# Patient Record
Sex: MALE | Race: WHITE | NOT HISPANIC OR LATINO
[De-identification: names, ages, dates, MRNs, and addresses within clinical notes are randomized per-mention and may not be internally consistent; named-entity substitution may affect disease eponyms.]

---

## 2017-08-15 ENCOUNTER — APPOINTMENT (OUTPATIENT)
Dept: ELECTROPHYSIOLOGY | Facility: CLINIC | Age: 47
End: 2017-08-15
Payer: COMMERCIAL

## 2017-08-15 VITALS
HEART RATE: 72 BPM | DIASTOLIC BLOOD PRESSURE: 82 MMHG | BODY MASS INDEX: 32.21 KG/M2 | HEIGHT: 70 IN | WEIGHT: 225 LBS | SYSTOLIC BLOOD PRESSURE: 122 MMHG

## 2017-08-15 PROCEDURE — 99214 OFFICE O/P EST MOD 30 MIN: CPT

## 2017-08-15 PROCEDURE — 93000 ELECTROCARDIOGRAM COMPLETE: CPT

## 2017-12-14 ENCOUNTER — MEDICATION RENEWAL (OUTPATIENT)
Age: 47
End: 2017-12-14

## 2018-01-04 ENCOUNTER — MEDICATION RENEWAL (OUTPATIENT)
Age: 48
End: 2018-01-04

## 2018-02-28 ENCOUNTER — APPOINTMENT (OUTPATIENT)
Dept: ELECTROPHYSIOLOGY | Facility: CLINIC | Age: 48
End: 2018-02-28
Payer: COMMERCIAL

## 2018-02-28 PROCEDURE — 99215 OFFICE O/P EST HI 40 MIN: CPT

## 2018-02-28 PROCEDURE — 93000 ELECTROCARDIOGRAM COMPLETE: CPT

## 2018-03-27 ENCOUNTER — OUTPATIENT (OUTPATIENT)
Dept: OUTPATIENT SERVICES | Facility: HOSPITAL | Age: 48
LOS: 1 days | Discharge: HOME | End: 2018-03-27
Payer: COMMERCIAL

## 2018-03-27 DIAGNOSIS — Z98.89 OTHER SPECIFIED POSTPROCEDURAL STATES: Chronic | ICD-10-CM

## 2018-03-27 DIAGNOSIS — R06.00 DYSPNEA, UNSPECIFIED: ICD-10-CM

## 2018-03-27 DIAGNOSIS — Z96.652 PRESENCE OF LEFT ARTIFICIAL KNEE JOINT: Chronic | ICD-10-CM

## 2018-03-27 DIAGNOSIS — R00.2 PALPITATIONS: ICD-10-CM

## 2018-03-27 DIAGNOSIS — I34.0 NONRHEUMATIC MITRAL (VALVE) INSUFFICIENCY: ICD-10-CM

## 2018-03-27 PROCEDURE — 93018 CV STRESS TEST I&R ONLY: CPT

## 2018-03-27 PROCEDURE — 93350 STRESS TTE ONLY: CPT | Mod: 26

## 2018-03-27 PROCEDURE — 93016 CV STRESS TEST SUPVJ ONLY: CPT

## 2018-07-12 ENCOUNTER — APPOINTMENT (OUTPATIENT)
Dept: ELECTROPHYSIOLOGY | Facility: CLINIC | Age: 48
End: 2018-07-12

## 2018-07-18 ENCOUNTER — MEDICATION RENEWAL (OUTPATIENT)
Age: 48
End: 2018-07-18

## 2018-08-04 ENCOUNTER — TRANSCRIPTION ENCOUNTER (OUTPATIENT)
Age: 48
End: 2018-08-04

## 2018-08-13 ENCOUNTER — TRANSCRIPTION ENCOUNTER (OUTPATIENT)
Age: 48
End: 2018-08-13

## 2018-10-26 ENCOUNTER — TRANSCRIPTION ENCOUNTER (OUTPATIENT)
Age: 48
End: 2018-10-26

## 2018-12-21 ENCOUNTER — APPOINTMENT (OUTPATIENT)
Dept: ELECTROPHYSIOLOGY | Facility: CLINIC | Age: 48
End: 2018-12-21
Payer: COMMERCIAL

## 2018-12-21 ENCOUNTER — NON-APPOINTMENT (OUTPATIENT)
Age: 48
End: 2018-12-21

## 2018-12-21 VITALS
DIASTOLIC BLOOD PRESSURE: 69 MMHG | BODY MASS INDEX: 31.5 KG/M2 | SYSTOLIC BLOOD PRESSURE: 123 MMHG | HEART RATE: 69 BPM | WEIGHT: 220 LBS | HEIGHT: 70 IN

## 2018-12-21 PROCEDURE — 99214 OFFICE O/P EST MOD 30 MIN: CPT

## 2018-12-21 PROCEDURE — 93000 ELECTROCARDIOGRAM COMPLETE: CPT

## 2019-01-25 ENCOUNTER — APPOINTMENT (OUTPATIENT)
Dept: ORTHOPEDIC SURGERY | Facility: CLINIC | Age: 49
End: 2019-01-25
Payer: OTHER MISCELLANEOUS

## 2019-01-25 VITALS — HEIGHT: 70 IN | BODY MASS INDEX: 31.5 KG/M2 | WEIGHT: 220 LBS

## 2019-01-25 PROCEDURE — 73560 X-RAY EXAM OF KNEE 1 OR 2: CPT | Mod: LT

## 2019-01-25 PROCEDURE — 73562 X-RAY EXAM OF KNEE 3: CPT | Mod: RT

## 2019-01-25 PROCEDURE — 99214 OFFICE O/P EST MOD 30 MIN: CPT

## 2019-02-11 NOTE — HISTORY OF PRESENT ILLNESS
0815 Patient allergies and NPO status verified. Patient verbalizes understanding of pain scale, expected course of stay and plan of care. Surgical site verified with patient. IV assessed for patency, pt awaiting surgery.       [FreeTextEntry1] : This is a 48-year-old gentleman with a history of PVCs and palpitations who presents for followup. He reports that his PVCs are well controlled with only rare episodes of palpitations.\par \par DINORA WESTON  denies chest pain, chest pressure, shortness of breath, lightheadedness, dizziness, recent palpitations, syncope, presyncope, orthopnea, PND, or edema.

## 2019-02-11 NOTE — PHYSICAL EXAM
[General Appearance - Well Developed] : well developed [Normal Appearance] : normal appearance [Well Groomed] : well groomed [General Appearance - Well Nourished] : well nourished [No Deformities] : no deformities [General Appearance - In No Acute Distress] : no acute distress [Normal Conjunctiva] : the conjunctiva exhibited no abnormalities [Eyelids - No Xanthelasma] : the eyelids demonstrated no xanthelasmas [Normal Oral Mucosa] : normal oral mucosa [No Oral Pallor] : no oral pallor [No Oral Cyanosis] : no oral cyanosis [Normal Jugular Venous A Waves Present] : normal jugular venous A waves present [Normal Jugular Venous V Waves Present] : normal jugular venous V waves present [No Jugular Venous Brewer A Waves] : no jugular venous brewer A waves [Respiration, Rhythm And Depth] : normal respiratory rhythm and effort [Exaggerated Use Of Accessory Muscles For Inspiration] : no accessory muscle use [Auscultation Breath Sounds / Voice Sounds] : lungs were clear to auscultation bilaterally [Heart Rate And Rhythm] : heart rate and rhythm were normal [Heart Sounds] : normal S1 and S2 [Murmurs] : no murmurs present [Abdomen Soft] : soft [Abdomen Tenderness] : non-tender [Abdomen Mass (___ Cm)] : no abdominal mass palpated [Abnormal Walk] : normal gait [Gait - Sufficient For Exercise Testing] : the gait was sufficient for exercise testing [Nail Clubbing] : no clubbing of the fingernails [Cyanosis, Localized] : no localized cyanosis [Petechial Hemorrhages (___cm)] : no petechial hemorrhages [Skin Color & Pigmentation] : normal skin color and pigmentation [] : no rash [No Venous Stasis] : no venous stasis [Skin Lesions] : no skin lesions [No Skin Ulcers] : no skin ulcer [No Xanthoma] : no  xanthoma was observed [Oriented To Time, Place, And Person] : oriented to person, place, and time [Affect] : the affect was normal [Mood] : the mood was normal [No Anxiety] : not feeling anxious

## 2019-02-11 NOTE — ASSESSMENT
[FreeTextEntry1] : This is a 47-year-old gentleman with RVOT morphology  PVCs with stable symptoms.

## 2019-04-15 RX ORDER — AZTREONAM 2 G
1 VIAL (EA) INJECTION
Qty: 0 | Refills: 0 | DISCHARGE
Start: 2019-04-15

## 2019-04-15 RX ORDER — CEFUROXIME AXETIL 250 MG
1 TABLET ORAL
Qty: 0 | Refills: 0 | DISCHARGE
Start: 2019-04-15

## 2019-04-25 ENCOUNTER — INPATIENT (INPATIENT)
Facility: HOSPITAL | Age: 49
LOS: 2 days | Discharge: HOME | End: 2019-04-28
Attending: INTERNAL MEDICINE | Admitting: INTERNAL MEDICINE
Payer: COMMERCIAL

## 2019-04-25 VITALS
TEMPERATURE: 99 F | HEIGHT: 70 IN | RESPIRATION RATE: 17 BRPM | OXYGEN SATURATION: 95 % | HEART RATE: 130 BPM | WEIGHT: 220.02 LBS | DIASTOLIC BLOOD PRESSURE: 79 MMHG | SYSTOLIC BLOOD PRESSURE: 136 MMHG

## 2019-04-25 DIAGNOSIS — Z96.652 PRESENCE OF LEFT ARTIFICIAL KNEE JOINT: Chronic | ICD-10-CM

## 2019-04-25 DIAGNOSIS — Z98.89 OTHER SPECIFIED POSTPROCEDURAL STATES: Chronic | ICD-10-CM

## 2019-04-25 LAB
ALBUMIN SERPL ELPH-MCNC: 4.3 G/DL — SIGNIFICANT CHANGE UP (ref 3.3–5)
ALP SERPL-CCNC: 31 U/L — LOW (ref 40–120)
ALT FLD-CCNC: 34 U/L — SIGNIFICANT CHANGE UP (ref 12–78)
ANION GAP SERPL CALC-SCNC: 8 MMOL/L — SIGNIFICANT CHANGE UP (ref 5–17)
APPEARANCE UR: ABNORMAL
APTT BLD: 28.9 SEC — SIGNIFICANT CHANGE UP (ref 27.5–36.3)
AST SERPL-CCNC: 17 U/L — SIGNIFICANT CHANGE UP (ref 15–37)
BACTERIA # UR AUTO: ABNORMAL
BASOPHILS # BLD AUTO: 0 K/UL — SIGNIFICANT CHANGE UP (ref 0–0.2)
BASOPHILS NFR BLD AUTO: 0 % — SIGNIFICANT CHANGE UP (ref 0–2)
BILIRUB SERPL-MCNC: 1 MG/DL — SIGNIFICANT CHANGE UP (ref 0.2–1.2)
BILIRUB UR-MCNC: NEGATIVE — SIGNIFICANT CHANGE UP
BUN SERPL-MCNC: 14 MG/DL — SIGNIFICANT CHANGE UP (ref 7–23)
CALCIUM SERPL-MCNC: 9.1 MG/DL — SIGNIFICANT CHANGE UP (ref 8.5–10.1)
CHLORIDE SERPL-SCNC: 106 MMOL/L — SIGNIFICANT CHANGE UP (ref 96–108)
CO2 SERPL-SCNC: 25 MMOL/L — SIGNIFICANT CHANGE UP (ref 22–31)
COLOR SPEC: ABNORMAL
COMMENT - URINE: SIGNIFICANT CHANGE UP
CREAT SERPL-MCNC: 1.16 MG/DL — SIGNIFICANT CHANGE UP (ref 0.5–1.3)
DIFF PNL FLD: ABNORMAL
EOSINOPHIL # BLD AUTO: 0 K/UL — SIGNIFICANT CHANGE UP (ref 0–0.5)
EOSINOPHIL NFR BLD AUTO: 0 % — SIGNIFICANT CHANGE UP (ref 0–6)
GLUCOSE SERPL-MCNC: 91 MG/DL — SIGNIFICANT CHANGE UP (ref 70–99)
GLUCOSE UR QL: NEGATIVE MG/DL — SIGNIFICANT CHANGE UP
HCT VFR BLD CALC: 45.6 % — SIGNIFICANT CHANGE UP (ref 39–50)
HGB BLD-MCNC: 16.2 G/DL — SIGNIFICANT CHANGE UP (ref 13–17)
INR BLD: 1.23 RATIO — HIGH (ref 0.88–1.16)
KETONES UR-MCNC: NEGATIVE — SIGNIFICANT CHANGE UP
LACTATE SERPL-SCNC: 2 MMOL/L — SIGNIFICANT CHANGE UP (ref 0.7–2)
LEUKOCYTE ESTERASE UR-ACNC: ABNORMAL
LYMPHOCYTES # BLD AUTO: 0.39 K/UL — LOW (ref 1–3.3)
LYMPHOCYTES # BLD AUTO: 4 % — LOW (ref 13–44)
MANUAL SMEAR VERIFICATION: SIGNIFICANT CHANGE UP
MCHC RBC-ENTMCNC: 32 PG — SIGNIFICANT CHANGE UP (ref 27–34)
MCHC RBC-ENTMCNC: 35.5 GM/DL — SIGNIFICANT CHANGE UP (ref 32–36)
MCV RBC AUTO: 90.1 FL — SIGNIFICANT CHANGE UP (ref 80–100)
MONOCYTES # BLD AUTO: 0.1 K/UL — SIGNIFICANT CHANGE UP (ref 0–0.9)
MONOCYTES NFR BLD AUTO: 1 % — LOW (ref 2–14)
NEUTROPHILS # BLD AUTO: 9.1 K/UL — HIGH (ref 1.8–7.4)
NEUTROPHILS NFR BLD AUTO: 83 % — HIGH (ref 43–77)
NEUTS BAND # BLD: 11 % — HIGH (ref 0–8)
NITRITE UR-MCNC: NEGATIVE — SIGNIFICANT CHANGE UP
NRBC # BLD: 0 /100 — SIGNIFICANT CHANGE UP (ref 0–0)
NRBC # BLD: SIGNIFICANT CHANGE UP /100 WBCS (ref 0–0)
PH UR: 5 — SIGNIFICANT CHANGE UP (ref 5–8)
PLAT MORPH BLD: NORMAL — SIGNIFICANT CHANGE UP
PLATELET # BLD AUTO: 145 K/UL — LOW (ref 150–400)
POTASSIUM SERPL-MCNC: 3.6 MMOL/L — SIGNIFICANT CHANGE UP (ref 3.5–5.3)
POTASSIUM SERPL-SCNC: 3.6 MMOL/L — SIGNIFICANT CHANGE UP (ref 3.5–5.3)
PROT SERPL-MCNC: 7.6 GM/DL — SIGNIFICANT CHANGE UP (ref 6–8.3)
PROT UR-MCNC: 30 MG/DL
PROTHROM AB SERPL-ACNC: 13.7 SEC — HIGH (ref 10–12.9)
RBC # BLD: 5.06 M/UL — SIGNIFICANT CHANGE UP (ref 4.2–5.8)
RBC # FLD: 12.4 % — SIGNIFICANT CHANGE UP (ref 10.3–14.5)
RBC BLD AUTO: NORMAL — SIGNIFICANT CHANGE UP
RBC CASTS # UR COMP ASSIST: >50 /HPF (ref 0–4)
SODIUM SERPL-SCNC: 139 MMOL/L — SIGNIFICANT CHANGE UP (ref 135–145)
SP GR SPEC: 1.01 — SIGNIFICANT CHANGE UP (ref 1.01–1.02)
UROBILINOGEN FLD QL: NEGATIVE MG/DL — SIGNIFICANT CHANGE UP
VARIANT LYMPHS # BLD: 1 % — SIGNIFICANT CHANGE UP (ref 0–6)
WBC # BLD: 9.68 K/UL — SIGNIFICANT CHANGE UP (ref 3.8–10.5)
WBC # FLD AUTO: 9.68 K/UL — SIGNIFICANT CHANGE UP (ref 3.8–10.5)
WBC UR QL: SIGNIFICANT CHANGE UP

## 2019-04-25 PROCEDURE — 99285 EMERGENCY DEPT VISIT HI MDM: CPT

## 2019-04-25 PROCEDURE — 74177 CT ABD & PELVIS W/CONTRAST: CPT | Mod: 26

## 2019-04-25 PROCEDURE — 93010 ELECTROCARDIOGRAM REPORT: CPT

## 2019-04-25 RX ORDER — DOCUSATE SODIUM 100 MG
100 CAPSULE ORAL THREE TIMES A DAY
Qty: 0 | Refills: 0 | Status: DISCONTINUED | OUTPATIENT
Start: 2019-04-25 | End: 2019-04-28

## 2019-04-25 RX ORDER — ENOXAPARIN SODIUM 100 MG/ML
40 INJECTION SUBCUTANEOUS EVERY 24 HOURS
Qty: 0 | Refills: 0 | Status: DISCONTINUED | OUTPATIENT
Start: 2019-04-25 | End: 2019-04-25

## 2019-04-25 RX ORDER — PIPERACILLIN AND TAZOBACTAM 4; .5 G/20ML; G/20ML
3.38 INJECTION, POWDER, LYOPHILIZED, FOR SOLUTION INTRAVENOUS ONCE
Qty: 0 | Refills: 0 | Status: COMPLETED | OUTPATIENT
Start: 2019-04-25 | End: 2019-04-25

## 2019-04-25 RX ORDER — SODIUM CHLORIDE 9 MG/ML
1000 INJECTION INTRAMUSCULAR; INTRAVENOUS; SUBCUTANEOUS
Qty: 0 | Refills: 0 | Status: DISCONTINUED | OUTPATIENT
Start: 2019-04-25 | End: 2019-04-27

## 2019-04-25 RX ORDER — ACETAMINOPHEN 500 MG
1000 TABLET ORAL ONCE
Qty: 0 | Refills: 0 | Status: COMPLETED | OUTPATIENT
Start: 2019-04-25 | End: 2019-04-25

## 2019-04-25 RX ORDER — SODIUM CHLORIDE 9 MG/ML
1000 INJECTION INTRAMUSCULAR; INTRAVENOUS; SUBCUTANEOUS ONCE
Qty: 0 | Refills: 0 | Status: COMPLETED | OUTPATIENT
Start: 2019-04-25 | End: 2019-04-25

## 2019-04-25 RX ORDER — SODIUM CHLORIDE 9 MG/ML
2250 INJECTION INTRAMUSCULAR; INTRAVENOUS; SUBCUTANEOUS ONCE
Qty: 0 | Refills: 0 | Status: COMPLETED | OUTPATIENT
Start: 2019-04-25 | End: 2019-04-25

## 2019-04-25 RX ORDER — KETOROLAC TROMETHAMINE 30 MG/ML
15 SYRINGE (ML) INJECTION EVERY 6 HOURS
Qty: 0 | Refills: 0 | Status: DISCONTINUED | OUTPATIENT
Start: 2019-04-25 | End: 2019-04-28

## 2019-04-25 RX ORDER — ONDANSETRON 8 MG/1
4 TABLET, FILM COATED ORAL EVERY 6 HOURS
Qty: 0 | Refills: 0 | Status: DISCONTINUED | OUTPATIENT
Start: 2019-04-25 | End: 2019-04-28

## 2019-04-25 RX ORDER — VANCOMYCIN HCL 1 G
1500 VIAL (EA) INTRAVENOUS ONCE
Qty: 0 | Refills: 0 | Status: COMPLETED | OUTPATIENT
Start: 2019-04-25 | End: 2019-04-25

## 2019-04-25 RX ORDER — PANTOPRAZOLE SODIUM 20 MG/1
40 TABLET, DELAYED RELEASE ORAL
Qty: 0 | Refills: 0 | Status: DISCONTINUED | OUTPATIENT
Start: 2019-04-25 | End: 2019-04-28

## 2019-04-25 RX ORDER — PINDOLOL 10 MG
5 TABLET ORAL EVERY 12 HOURS
Qty: 0 | Refills: 0 | Status: DISCONTINUED | OUTPATIENT
Start: 2019-04-25 | End: 2019-04-28

## 2019-04-25 RX ORDER — MORPHINE SULFATE 50 MG/1
2 CAPSULE, EXTENDED RELEASE ORAL EVERY 4 HOURS
Qty: 0 | Refills: 0 | Status: DISCONTINUED | OUTPATIENT
Start: 2019-04-25 | End: 2019-04-28

## 2019-04-25 RX ORDER — ACETAMINOPHEN 500 MG
650 TABLET ORAL EVERY 6 HOURS
Qty: 0 | Refills: 0 | Status: DISCONTINUED | OUTPATIENT
Start: 2019-04-25 | End: 2019-04-28

## 2019-04-25 RX ORDER — PIPERACILLIN AND TAZOBACTAM 4; .5 G/20ML; G/20ML
3.38 INJECTION, POWDER, LYOPHILIZED, FOR SOLUTION INTRAVENOUS EVERY 8 HOURS
Qty: 0 | Refills: 0 | Status: DISCONTINUED | OUTPATIENT
Start: 2019-04-25 | End: 2019-04-28

## 2019-04-25 RX ADMIN — Medication 1000 MILLIGRAM(S): at 12:30

## 2019-04-25 RX ADMIN — Medication 250 MILLIGRAM(S): at 15:12

## 2019-04-25 RX ADMIN — SODIUM CHLORIDE 1125 MILLILITER(S): 9 INJECTION INTRAMUSCULAR; INTRAVENOUS; SUBCUTANEOUS at 11:08

## 2019-04-25 RX ADMIN — SODIUM CHLORIDE 2250 MILLILITER(S): 9 INJECTION INTRAMUSCULAR; INTRAVENOUS; SUBCUTANEOUS at 12:15

## 2019-04-25 RX ADMIN — PIPERACILLIN AND TAZOBACTAM 25 GRAM(S): 4; .5 INJECTION, POWDER, LYOPHILIZED, FOR SOLUTION INTRAVENOUS at 22:18

## 2019-04-25 RX ADMIN — Medication 650 MILLIGRAM(S): at 21:30

## 2019-04-25 RX ADMIN — Medication 1000 MILLIGRAM(S): at 11:26

## 2019-04-25 RX ADMIN — PIPERACILLIN AND TAZOBACTAM 3.38 GRAM(S): 4; .5 INJECTION, POWDER, LYOPHILIZED, FOR SOLUTION INTRAVENOUS at 12:24

## 2019-04-25 RX ADMIN — PIPERACILLIN AND TAZOBACTAM 200 GRAM(S): 4; .5 INJECTION, POWDER, LYOPHILIZED, FOR SOLUTION INTRAVENOUS at 11:27

## 2019-04-25 RX ADMIN — SODIUM CHLORIDE 2000 MILLILITER(S): 9 INJECTION INTRAMUSCULAR; INTRAVENOUS; SUBCUTANEOUS at 22:09

## 2019-04-25 NOTE — ED STATDOCS - PMH
Gastroesophageal reflux disease without esophagitis  on occasion  Knee pain, left    Lumbar disc herniation  L4-5, asymptomatic  NSVT (nonsustained ventricular tachycardia)    Osteoarthritis of left knee    Palpitations

## 2019-04-25 NOTE — ED ADULT TRIAGE NOTE - CHIEF COMPLAINT QUOTE
Patient complaining of fever, headache, bloody urine and states "my kidneys hurt". Patient had needle biopsy of prostates on Tuesday evening 4/23 at 5pm. patient reports he has been on antibiotics after procedure. denies taking motrin or tylenol.

## 2019-04-25 NOTE — ED STATDOCS - OBJECTIVE STATEMENT
50 y/o M with a PMHx of GERD, OA, and Lumbar Disc Herniation presenting to the ED c/o sudden onset chills and fever this morning. Pt reports that he had a prostate biopsy two days ago by Dr. Douglas. Started on Bactrim and Cefuroxime three days ago. This morning after getting out of the shower, pt started experience sudden onset of chills. He then started to feel hot, took temp of 103F. Called Dr. Douglas's office and advised to come into ED for evaluation. +bilateral flank pain. Temp 98.8F orally in ED triage vitals. Denies any CP, SOB, vomiting, diarrhea, or rashes. Denies taking any medications at home today for symptoms.

## 2019-04-25 NOTE — ED STATDOCS - PROGRESS NOTE DETAILS
Ilir FINCH for ED attending, Dr. Lazo:  IV fluids will be given based on ideal body weight, height of 5'10". 49 yr. old male PMH: GERD, OA Lumbar Disc Herniation presents to ED with sudden onset of fever of 103, chills, bilateral flank pain and hematuria. Recent Biopsy of prostate at 5 pm on Tuesday.  Started Rx. Bactrim and Cefuroxime on Monday. Currently on Pindolol for palpitations. Seen and examined by attending ion Intake. Plan: IV, IVF, labs, Zosyn, UA/UC  Will F/U with results and re evaluate. Avery DYSON Results of Lab work and CT abd./pelvis discussed with patient and plan to admit for ferver S/P prostate biopsy. Avery DYSON

## 2019-04-25 NOTE — ED ADULT NURSE NOTE - NSIMPLEMENTINTERV_GEN_ALL_ED
Implemented All Universal Safety Interventions:  [place] to call system. Call bell, personal items and telephone within reach. Instruct patient to call for assistance. Room bathroom lighting operational. Non-slip footwear when patient is off stretcher. Physically safe environment: no spills, clutter or unnecessary equipment. Stretcher in lowest position, wheels locked, appropriate side rails in place.

## 2019-04-25 NOTE — ED STATDOCS - PSH
S/P ablation of ventricular arrhythmia  2008  S/P hemorrhoidectomy  2014  S/P knee surgery  1984, 1991, 2007, 2008, 2013  S/P laparoscopy  2010  S/P LASIK surgery  2013  S/P shoulder surgery  1995, 1999  S/P tonsillectomy and adenoidectomy  1973  Status post total left knee replacement  2014

## 2019-04-25 NOTE — H&P ADULT - NSICDXPASTMEDICALHX_GEN_ALL_CORE_FT
PAST MEDICAL HISTORY:  Gastroesophageal reflux disease without esophagitis on occasion    Knee pain, left     Lumbar disc herniation L4-5, asymptomatic    NSVT (nonsustained ventricular tachycardia)     Osteoarthritis of left knee     Palpitations

## 2019-04-25 NOTE — H&P ADULT - NSICDXFAMILYHX_GEN_ALL_CORE_FT
FAMILY HISTORY:  Mother  Still living? Yes, Estimated age: 61-70  Family history of alcoholism, Age at diagnosis: Age Unknown  Family history of cervical cancer, Age at diagnosis: 31-40    Grandparent  Still living? No  Family history of alcoholism, Age at diagnosis: Age Unknown

## 2019-04-25 NOTE — ED ADULT NURSE REASSESSMENT NOTE - NS ED NURSE REASSESS COMMENT FT1
Patient reports that he takes pindolol and has not received his daily dose.  Dose confirmed and hospitalist called referred to Senior Resident spoke with Senior night Resident asked that his Pindolol be ordered awaiting order at this time.  Patient moved to ST 9 for his comfort, pillow and blankets given.  Patient oriented to call bell and TV.  Will continue to monitor.

## 2019-04-25 NOTE — H&P ADULT - HISTORY OF PRESENT ILLNESS
49 yr. old male PMH: GERD, OA Lumbar Disc Herniation presents to ED with sudden onset of fever of 103, chills, bilateral flank pain and hematuria. Recent Biopsy of prostate at 5 pm on Tuesday.  Started Rx. Bactrim and Cefuroxime on Monday. Currently on Pindolol for palpitations. Seen and examined by attending ion Intake. Plan: IV, IVF, labs, Zosyn, UA/UC  Will F/U with results and re evaluate. Avery DYSON. 49 yr. old male w/ PMH GERD, OA Lumbar Disc Herniation, prostate issues w/ s/p biopsy 4/23 was given oral abx Bactrim and Cefuroxime, developed fevers at home.  Patirnt states she developed fever at home 103 and since recent procedure, he presented to ER.  He admits to hematuria in urine initally when he urinates, and then the rest of the urine runs clean.  He also admits to b/l flank pains. Currently he denies any fevers, chills, chest pain, n/v/d

## 2019-04-25 NOTE — H&P ADULT - ASSESSMENT
49 yr. old male w/ PMH GERD, OA Lumbar Disc Herniation, prostate issues w/ s/p biopsy 4/23 was given oral abx Bactrim and Cefuroxime, developed fevers at home.  Patirnt states she developed fever at home 103 and since recent procedure, he presented to ER.  He admits to hematuria in urine initally when he urinates, and then the rest of the urine runs clean.  He also admits to b/l flank pains.       temp 100.6 in ER w/  w/ temp at home 103  Found to have Bandemia.    1-sepsis secondary to prostatitis, uti, and possibly underlying early pyelonephritis secondary to urologic procedure  -patients admits to b/l flank pain/ mild pain on exam  -start zosyn  -f/u urine cx/ blood cx  -infectious disease consult  -urology consult with dr. leonardo  -iv fluids ns@80cc/hr  '-pain control iv ketorolac, iv morphine    2-DVT prophhy- no risk factors, young paient, encourage ambulation

## 2019-04-25 NOTE — H&P ADULT - NSICDXPASTSURGICALHX_GEN_ALL_CORE_FT
PAST SURGICAL HISTORY:  S/P ablation of ventricular arrhythmia 2008    S/P hemorrhoidectomy 2014    S/P knee surgery 1984, 1991, 2007, 2008, 2013    S/P laparoscopy 2010    S/P LASIK surgery 2013    S/P shoulder surgery 1995, 1999    S/P tonsillectomy and adenoidectomy 1973    Status post total left knee replacement 2014

## 2019-04-25 NOTE — CHART NOTE - NSCHARTNOTEFT_GEN_A_CORE
called by nurse that patient is shaking.    came to evaluate patient, noted to have rigors and fever.   maintenance fluid ordered 150cc/hr for 10 hours  zosyn being hung  sepsis w/u preformed in ED upon admission with normal lactate  tylenol for fever.

## 2019-04-25 NOTE — ED STATDOCS - CLINICAL SUMMARY MEDICAL DECISION MAKING FREE TEXT BOX
Pt febrile, with bandemia 2/2 prostate biopsy, tachycardic to 130's.  UA with large blood, appears not infected.  CT scan without signs of abscess.  Given IV fluids, Zosyn.  Adding on vancomycin.  Admit to medicine service for further evaluation and management.

## 2019-04-25 NOTE — H&P ADULT - NSHPLABSRESULTS_GEN_ALL_CORE
Urinalysis Basic - ( 2019 11:11 )    Color: Barbara / Appearance: very cloudy / S.010 / pH: x  Gluc: x / Ketone: Negative  / Bili: Negative / Urobili: Negative mg/dL   Blood: x / Protein: 30 mg/dL / Nitrite: Negative   Leuk Esterase: Trace / RBC: >50 /HPF / WBC 3-5   Sq Epi: x / Non Sq Epi: x / Bacteria: Few    2019 11:11    139    |  106    |  14     ----------------------------<  91     3.6     |  25     |  1.16     Ca    9.1        2019 11:11    TPro  7.6    /  Alb  4.3    /  TBili  1.0    /  DBili  x      /  AST  17     /  ALT  34     /  AlkPhos  31     2019 11:11  LIVER FUNCTIONS - ( 2019 11:11 )  Alb: 4.3 g/dL / Pro: 7.6 gm/dL / ALK PHOS: 31 U/L / ALT: 34 U/L / AST: 17 U/L / GGT: x         PT/INR - ( 2019 11:11 )   PT: 13.7 sec;   INR: 1.23 ratio         PTT - ( 2019 11:11 )  PTT:28.9 secCBC Full  -  ( 2019 11:11 )  WBC Count : 9.68 K/uL  Hemoglobin : 16.2 g/dL  Hematocrit : 45.6 %  Platelet Count - Automated : 145 K/uL  Mean Cell Volume : 90.1 fl  Mean Cell Hemoglobin : 32.0 pg  Mean Cell Hemoglobin Concentration : 35.5 gm/dL  Auto Neutrophil # : 9.10 K/uL  Auto Lymphocyte # : 0.39 K/uL  Auto Monocyte # : 0.10 K/uL  Auto Eosinophil # : 0.00 K/uL  Auto Basophil # : 0.00 K/uL  Auto Neutrophil % : 83.0 %  Auto Lymphocyte % : 4.0 %  Auto Monocyte % : 1.0 %  Auto Eosinophil % : 0.0 %  Auto Basophil % : 0.0 %

## 2019-04-25 NOTE — H&P ADULT - NSHPPHYSICALEXAM_GEN_ALL_CORE
Vital Signs Last 24 Hrs  T(C): 37.5 (25 Apr 2019 21:04), Max: 38.1 (25 Apr 2019 12:23)  T(F): 99.5 (25 Apr 2019 21:04), Max: 100.6 (25 Apr 2019 12:23)  HR: 83 (25 Apr 2019 21:04) (83 - 130)  BP: 118/71 (25 Apr 2019 21:04) (100/66 - 138/61)  BP(mean): --  RR: 19 (25 Apr 2019 21:04) (17 - 19)  SpO2: 99% (25 Apr 2019 21:04) (95% - 99%)    HEENT:   pupils equal and reactive, EOMI, no oropharyngeal lesions, erythema, exudates, oral thrush    NECK:   supple, no carotid bruits, no palpable lymph nodes, no thyromegaly    CV:  +S1, +S2, regular, no murmurs or rubs    RESP:   lungs clear to auscultation bilaterally, no wheezing, rales, rhonchi, good air entry bilaterally    BREAST:  not examined    GI:  abdomen soft, non-tender, non-distended, normal BS, no bruits, no abdominal masses, no palpable masses    RECTAL:  not examined    :  not examined    MSK:   normal muscle tone, no atrophy, no rigidity, no contractions    EXT:   no clubbing, no cyanosis, no edema, no calf pain, swelling or erythema    VASCULAR:  pulses equal and symmetric in the upper and lower extremities    NEURO:  AAOX3, no focal neurological deficits, follows all commands, able to move extremities spontaneously    SKIN:  no ulcers, lesions or rashes

## 2019-04-26 LAB
ANION GAP SERPL CALC-SCNC: 4 MMOL/L — LOW (ref 5–17)
BASOPHILS # BLD AUTO: 0.04 K/UL — SIGNIFICANT CHANGE UP (ref 0–0.2)
BASOPHILS NFR BLD AUTO: 0.3 % — SIGNIFICANT CHANGE UP (ref 0–2)
BUN SERPL-MCNC: 12 MG/DL — SIGNIFICANT CHANGE UP (ref 7–23)
CALCIUM SERPL-MCNC: 8 MG/DL — LOW (ref 8.5–10.1)
CHLORIDE SERPL-SCNC: 110 MMOL/L — HIGH (ref 96–108)
CO2 SERPL-SCNC: 27 MMOL/L — SIGNIFICANT CHANGE UP (ref 22–31)
CREAT SERPL-MCNC: 0.94 MG/DL — SIGNIFICANT CHANGE UP (ref 0.5–1.3)
CULTURE RESULTS: NO GROWTH — SIGNIFICANT CHANGE UP
E COLI DNA BLD POS QL NAA+NON-PROBE: SIGNIFICANT CHANGE UP
EOSINOPHIL # BLD AUTO: 0.02 K/UL — SIGNIFICANT CHANGE UP (ref 0–0.5)
EOSINOPHIL NFR BLD AUTO: 0.2 % — SIGNIFICANT CHANGE UP (ref 0–6)
GLUCOSE SERPL-MCNC: 106 MG/DL — HIGH (ref 70–99)
GRAM STN FLD: SIGNIFICANT CHANGE UP
HCT VFR BLD CALC: 38.9 % — LOW (ref 39–50)
HGB BLD-MCNC: 13.3 G/DL — SIGNIFICANT CHANGE UP (ref 13–17)
IMM GRANULOCYTES NFR BLD AUTO: 0.3 % — SIGNIFICANT CHANGE UP (ref 0–1.5)
LYMPHOCYTES # BLD AUTO: 1.36 K/UL — SIGNIFICANT CHANGE UP (ref 1–3.3)
LYMPHOCYTES # BLD AUTO: 11.4 % — LOW (ref 13–44)
MCHC RBC-ENTMCNC: 31.6 PG — SIGNIFICANT CHANGE UP (ref 27–34)
MCHC RBC-ENTMCNC: 34.2 GM/DL — SIGNIFICANT CHANGE UP (ref 32–36)
MCV RBC AUTO: 92.4 FL — SIGNIFICANT CHANGE UP (ref 80–100)
METHOD TYPE: SIGNIFICANT CHANGE UP
MONOCYTES # BLD AUTO: 0.87 K/UL — SIGNIFICANT CHANGE UP (ref 0–0.9)
MONOCYTES NFR BLD AUTO: 7.3 % — SIGNIFICANT CHANGE UP (ref 2–14)
NEUTROPHILS # BLD AUTO: 9.65 K/UL — HIGH (ref 1.8–7.4)
NEUTROPHILS NFR BLD AUTO: 80.5 % — HIGH (ref 43–77)
NRBC # BLD: 0 /100 WBCS — SIGNIFICANT CHANGE UP (ref 0–0)
PLATELET # BLD AUTO: 116 K/UL — LOW (ref 150–400)
POTASSIUM SERPL-MCNC: 3.8 MMOL/L — SIGNIFICANT CHANGE UP (ref 3.5–5.3)
POTASSIUM SERPL-SCNC: 3.8 MMOL/L — SIGNIFICANT CHANGE UP (ref 3.5–5.3)
RBC # BLD: 4.21 M/UL — SIGNIFICANT CHANGE UP (ref 4.2–5.8)
RBC # FLD: 12.9 % — SIGNIFICANT CHANGE UP (ref 10.3–14.5)
SODIUM SERPL-SCNC: 141 MMOL/L — SIGNIFICANT CHANGE UP (ref 135–145)
SPECIMEN SOURCE: SIGNIFICANT CHANGE UP
WBC # BLD: 11.97 K/UL — HIGH (ref 3.8–10.5)
WBC # FLD AUTO: 11.97 K/UL — HIGH (ref 3.8–10.5)

## 2019-04-26 RX ORDER — SODIUM CHLORIDE 9 MG/ML
1000 INJECTION INTRAMUSCULAR; INTRAVENOUS; SUBCUTANEOUS ONCE
Qty: 0 | Refills: 0 | Status: COMPLETED | OUTPATIENT
Start: 2019-04-26 | End: 2019-04-26

## 2019-04-26 RX ADMIN — PIPERACILLIN AND TAZOBACTAM 25 GRAM(S): 4; .5 INJECTION, POWDER, LYOPHILIZED, FOR SOLUTION INTRAVENOUS at 06:27

## 2019-04-26 RX ADMIN — PIPERACILLIN AND TAZOBACTAM 25 GRAM(S): 4; .5 INJECTION, POWDER, LYOPHILIZED, FOR SOLUTION INTRAVENOUS at 21:35

## 2019-04-26 RX ADMIN — PANTOPRAZOLE SODIUM 40 MILLIGRAM(S): 20 TABLET, DELAYED RELEASE ORAL at 06:27

## 2019-04-26 RX ADMIN — PIPERACILLIN AND TAZOBACTAM 25 GRAM(S): 4; .5 INJECTION, POWDER, LYOPHILIZED, FOR SOLUTION INTRAVENOUS at 13:37

## 2019-04-26 RX ADMIN — SODIUM CHLORIDE 80 MILLILITER(S): 9 INJECTION INTRAMUSCULAR; INTRAVENOUS; SUBCUTANEOUS at 09:37

## 2019-04-26 RX ADMIN — Medication 650 MILLIGRAM(S): at 21:34

## 2019-04-26 RX ADMIN — SODIUM CHLORIDE 150 MILLILITER(S): 9 INJECTION INTRAMUSCULAR; INTRAVENOUS; SUBCUTANEOUS at 06:28

## 2019-04-26 RX ADMIN — SODIUM CHLORIDE 2000 MILLILITER(S): 9 INJECTION INTRAMUSCULAR; INTRAVENOUS; SUBCUTANEOUS at 03:25

## 2019-04-26 RX ADMIN — Medication 650 MILLIGRAM(S): at 06:33

## 2019-04-26 RX ADMIN — Medication 5 MILLIGRAM(S): at 17:19

## 2019-04-26 NOTE — CONSULT NOTE ADULT - ASSESSMENT
49 yr. old male w/ PMH GERD, OA Lumbar Disc Herniation, elevated PSA who underwent prostate biopsy on 4/23 for which he was pretreated with oral antibiotics admitted on 4/25 for evaluation of shaking chills and fever to 103. Notes blood in urine and mild bilateral flank pain. No other specific complaints, is feeling better since admission.  1. Patient admitted with sepsis secondary to urinary origin due to E coli  - most likely patient has prostatitis  - iv hydration and supportive care   - serial cbc and monitor temperature   - reviewed prior medical records to evaluate for resistant or atypical pathogens   - agree with zosyn as ordered  - will repeat blood cultures in am  - follow up cultures for sensitivity  2. other issues: per medicine

## 2019-04-26 NOTE — CONSULT NOTE ADULT - SUBJECTIVE AND OBJECTIVE BOX
Patient is a 49y old  Male who presents with a chief complaint of Fever/ UTI/ possible pyelo (2019 15:21)    HPI:  49 yr. old male w/ PMH GERD, OA Lumbar Disc Herniation, elevated PSA who underwent prostate biopsy on  for which he was pretreated with oral antibiotics admitted on  for evaluation of shaking chills and fever to 103. Notes blood in urine and mild bilateral flank pain. No other specific complaints, is feeling better since admission.            PMH: as above  PSH: as above  Meds: per reconciliation sheet, noted below  MEDICATIONS  (STANDING):  docusate sodium 100 milliGRAM(s) Oral three times a day  pantoprazole    Tablet 40 milliGRAM(s) Oral before breakfast  pindolol 5 milliGRAM(s) Oral every 12 hours  piperacillin/tazobactam IVPB. 3.375 Gram(s) IV Intermittent every 8 hours  sodium chloride 0.9%. 1000 milliLiter(s) (150 mL/Hr) IV Continuous <Continuous>  sodium chloride 0.9%. 1000 milliLiter(s) (80 mL/Hr) IV Continuous <Continuous>    MEDICATIONS  (PRN):  acetaminophen   Tablet .. 650 milliGRAM(s) Oral every 6 hours PRN Temp greater or equal to 38C (100.4F), Mild Pain (1 - 3)  ketorolac   Injectable 15 milliGRAM(s) IV Push every 6 hours PRN Moderate Pain (4 - 6)  morphine  - Injectable 2 milliGRAM(s) IV Push every 4 hours PRN Severe Pain (7 - 10)  ondansetron Injectable 4 milliGRAM(s) IV Push every 6 hours PRN Nausea    Allergies    adhesives (Rash)  hayfever (Rhinitis)  ketamine (Other)  Originally Entered as [Rash] reaction to [adhesive tape] (Unknown)  Synvisc (Swelling)    Intolerances      Social: no smoking, no alcohol, no illegal drugs; no recent travel, no exposure to TB  FAMILY HISTORY:  Family history of alcoholism (Mother, Grandparent)  Family history of cervical cancer (Mother)     no history of premature cardiovascular disease in first degree relatives  ROS: the patient has no HA, no dizziness, no sore throat, no blurry vision, no CP, no palpitations, no SOB, no cough, no abdominal pain, no diarrhea, no N/V, no dysuria, no leg pain, no claudication, no rash, no joint aches, no rectal pain or bleeding, no night sweats  All other systems reviewed and are negative    Vital Signs Last 24 Hrs  T(C): 36.8 (2019 11:55), Max: 39.1 (2019 21:25)  T(F): 98.3 (2019 11:55), Max: 102.4 (2019 21:25)  HR: 80 (2019 11:55) (78 - 124)  BP: 104/47 (2019 11:55) (95/55 - 138/61)  BP(mean): --  RR: 17 (2019 11:55) (17 - 20)  SpO2: 99% (2019 11:55) (95% - 99%)  Daily     Daily     PE:    Constitutional: frail looking  HEENT: NC/AT, EOMI, PERRLA, conjunctivae clear; ears and nose atraumatic; pharynx clear  Neck: supple; thyroid not palpable  Back: no tenderness  Respiratory: respiratory effort normal; clear to auscultation  Cardiovascular: S1S2 regular, no murmurs  Abdomen: soft, not tender, not distended, positive BS; no liver or spleen organomegaly  Genitourinary: no suprapubic tenderness  Musculoskeletal: no muscle tenderness, no joint swelling or tenderness  Neurological/ Psychiatric: AxOx3, judgement and insight normal;  moving all extremities  Skin: no rashes; no palpable lesions    Labs: all available labs reviewed                        13.3   11.97 )-----------( 116      ( 2019 07:16 )             38.9     04-    141  |  110<H>  |  12  ----------------------------<  106<H>  3.8   |  27  |  0.94    Ca    8.0<L>      2019 07:16    TPro  7.6  /  Alb  4.3  /  TBili  1.0  /  DBili  x   /  AST  17  /  ALT  34  /  AlkPhos  31<L>  04-25     LIVER FUNCTIONS - ( 2019 11:11 )  Alb: 4.3 g/dL / Pro: 7.6 gm/dL / ALK PHOS: 31 U/L / ALT: 34 U/L / AST: 17 U/L / GGT: x           Urinalysis Basic - ( 2019 11:11 )    Color: Barbara / Appearance: very cloudy / S.010 / pH: x  Gluc: x / Ketone: Negative  / Bili: Negative / Urobili: Negative mg/dL   Blood: x / Protein: 30 mg/dL / Nitrite: Negative   Leuk Esterase: Trace / RBC: >50 /HPF / WBC 3-5   Sq Epi: x / Non Sq Epi: x / Bacteria: Few      Culture - Urine (19 @ 11:11)    Specimen Source: .Urine None    Culture Results:   No growth      Culture - Blood (19 @ 11:11)    -  Escherichia coli: Detec    Gram Stain:   Growth in aerobic bottle: Gram Negative Rods  Growth in anaerobic bottle: Gram Negative Rods    Specimen Source: .Blood None    Organism: Blood Culture PCR    Culture Results:   Growth in aerobic bottle: Gram Negative Rods  "Due to technical problems, Proteus sp. will Not be reported as part of  the BCID panel until further notice"  ***Blood Panel PCR results on this specimen are available  approximately 3 hours after the Gram stain result.***  Gram stain, PCR, and/or culture results may not always  correspond due to difference in methodologies.  ************************************************************  This PCR assay was performed using [employer].  The following targets are tested for: Enterococcus,  vancomycin resistant enterococci, Listeria monocytogenes,  coagulase negative staphylococci, S. aureus,  methicillin resistant S. aureus, Streptococcus agalactiae  (Group B), S. pneumoniae, S. pyogenes (Group A),  Acinetobacter baumannii, Enterobacter cloacae, E. coli,  Klebsiella oxytoca, K. pneumoniae, Proteus sp.,  Serratia marcescens, Haemophilus influenzae,  Neisseria meningitidis, Pseudomonas aeruginosa, Candida  albicans, C. glabrata, C krusei, C parapsilosis,  C. tropicalis and the KPC resistance gene.  Growth in anaerobic bottle: Gram Negative Rods    Organism Identification: Blood Culture PCR    Method Type: PCR    Culture - Blood (19 @ 11:11)    Gram Stain:   Growth in aerobic bottle: Gram Negative Rods  Growth in anaerobic bottle: Gram Negative Rods    Specimen Source: .Blood None    Culture Results:   Growth in aerobic bottle: Gram Negative Rods  Growth in anaerobic bottle: Gram Negative Rods                Radiology: all available radiological tests reviewed    Advanced directives addressed: full resuscitation

## 2019-04-26 NOTE — PROGRESS NOTE ADULT - SUBJECTIVE AND OBJECTIVE BOX
cc: Fever/ UTI/ possible pyelo	  History of Present Illness: 	  49 yr. old male w/ PMH GERD, OA Lumbar Disc Herniation, prostate issues w/ s/p biopsy  was given oral abx Bactrim and Cefuroxime, developed fevers at home.  Patirnt states she developed fever at home 103 and since recent procedure, he presented to ER.  He admits to hematuria in urine initally when he urinates, and then the rest of the urine runs clean.  He also admits to b/l flank pains. Currently he denies any fevers, chills, chest pain, n/v/d.    : Pt feeling better. Had fever 102 last night but currently afebrile.  Positive bacteremia gram neg.    REVIEW OF SYSTEMS: All other review of systems is negative unless indicated above.    Vital Signs Last 24 Hrs  T(C): 36.8 (2019 11:55), Max: 39.1 (2019 21:25)  T(F): 98.3 (2019 11:55), Max: 102.4 (2019 21:25)  HR: 80 (2019 11:55) (78 - 124)  BP: 104/47 (2019 11:55) (95/55 - 138/61)  BP(mean): --  RR: 17 (2019 11:55) (17 - 20)  SpO2: 99% (2019 11:55) (95% - 99%)    PHYSICAL EXAM:    Constitutional: NAD, awake and alert, well-developed  HEENT: PERR, EOMI, Normal Hearing, MMM  Neck: Soft and supple  Respiratory: Breath sounds are clear bilaterally, No wheezing, rales or rhonchi  Cardiovascular: S1 and S2, regular rate and rhythm, no Murmurs, gallops or rubs  Gastrointestinal: Bowel Sounds present, soft, nontender, nondistended, no guarding, no rebound  Extremities: No peripheral edema  Neurological: A/O x 3, no focal deficits in my limited exam  Skin: No rashes    MEDICATIONS  (STANDING):  docusate sodium 100 milliGRAM(s) Oral three times a day  pantoprazole    Tablet 40 milliGRAM(s) Oral before breakfast  pindolol 5 milliGRAM(s) Oral every 12 hours  piperacillin/tazobactam IVPB. 3.375 Gram(s) IV Intermittent every 8 hours  sodium chloride 0.9%. 1000 milliLiter(s) (150 mL/Hr) IV Continuous <Continuous>  sodium chloride 0.9%. 1000 milliLiter(s) (80 mL/Hr) IV Continuous <Continuous>    MEDICATIONS  (PRN):  acetaminophen   Tablet .. 650 milliGRAM(s) Oral every 6 hours PRN Temp greater or equal to 38C (100.4F), Mild Pain (1 - 3)  ketorolac   Injectable 15 milliGRAM(s) IV Push every 6 hours PRN Moderate Pain (4 - 6)  morphine  - Injectable 2 milliGRAM(s) IV Push every 4 hours PRN Severe Pain (7 - 10)  ondansetron Injectable 4 milliGRAM(s) IV Push every 6 hours PRN Nausea                              13.3   11.97 )-----------( 116      ( 2019 07:16 )             38.9     04-26    141  |  110<H>  |  12  ----------------------------<  106<H>  3.8   |  27  |  0.94    Ca    8.0<L>      2019 07:16    TPro  7.6  /  Alb  4.3  /  TBili  1.0  /  DBili  x   /  AST  17  /  ALT  34  /  AlkPhos  31<L>  04-25    CAPILLARY BLOOD GLUCOSE        LIVER FUNCTIONS - ( 2019 11:11 )  Alb: 4.3 g/dL / Pro: 7.6 gm/dL / ALK PHOS: 31 U/L / ALT: 34 U/L / AST: 17 U/L / GGT: x           PT/INR - ( 2019 11:11 )   PT: 13.7 sec;   INR: 1.23 ratio         PTT - ( 2019 11:11 )  PTT:28.9 sec  Urinalysis Basic - ( 2019 11:11 )    Color: Barbara / Appearance: very cloudy / S.010 / pH: x  Gluc: x / Ketone: Negative  / Bili: Negative / Urobili: Negative mg/dL   Blood: x / Protein: 30 mg/dL / Nitrite: Negative   Leuk Esterase: Trace / RBC: >50 /HPF / WBC 3-5   Sq Epi: x / Non Sq Epi: x / Bacteria: Few      Assessment and Plan:  49 yr. old male w/ PMH GERD, OA Lumbar Disc Herniation, prostate issues w/ s/p biopsy  was given oral abx Bactrim and Cefuroxime, developed fevers at home.      1-sepsis secondary to prostatitis, uti:  -c/w IV zosyn  -BCx with gram neg bacteria  -infectious disease consult appreciated  -urology consult with dr. leonardo  -IVFs  -supportive care    DVT prophhy  - no risk factors, young paient, encourage ambulation

## 2019-04-26 NOTE — PROVIDER CONTACT NOTE (CRITICAL VALUE NOTIFICATION) - TEST AND RESULT REPORTED:
Positive Blood Cultures drawn on 4/25 @ 11:11 prelim results:  #1 blood culture: growth in aerobic bottle gram negative rods and growth in anaerobic bottle gram negative rods  #2 blood culture: growth in aerobic bottle gram negative rods

## 2019-04-27 LAB
-  AMIKACIN: SIGNIFICANT CHANGE UP
-  AMPICILLIN/SULBACTAM: SIGNIFICANT CHANGE UP
-  AMPICILLIN: SIGNIFICANT CHANGE UP
-  AZTREONAM: SIGNIFICANT CHANGE UP
-  CEFAZOLIN: SIGNIFICANT CHANGE UP
-  CEFEPIME: SIGNIFICANT CHANGE UP
-  CEFOXITIN: SIGNIFICANT CHANGE UP
-  CEFTRIAXONE: SIGNIFICANT CHANGE UP
-  CIPROFLOXACIN: SIGNIFICANT CHANGE UP
-  ERTAPENEM: SIGNIFICANT CHANGE UP
-  GENTAMICIN: SIGNIFICANT CHANGE UP
-  IMIPENEM: SIGNIFICANT CHANGE UP
-  LEVOFLOXACIN: SIGNIFICANT CHANGE UP
-  MEROPENEM: SIGNIFICANT CHANGE UP
-  PIPERACILLIN/TAZOBACTAM: SIGNIFICANT CHANGE UP
-  TOBRAMYCIN: SIGNIFICANT CHANGE UP
-  TRIMETHOPRIM/SULFAMETHOXAZOLE: SIGNIFICANT CHANGE UP
ANION GAP SERPL CALC-SCNC: 8 MMOL/L — SIGNIFICANT CHANGE UP (ref 5–17)
BUN SERPL-MCNC: 7 MG/DL — SIGNIFICANT CHANGE UP (ref 7–23)
CALCIUM SERPL-MCNC: 8.5 MG/DL — SIGNIFICANT CHANGE UP (ref 8.5–10.1)
CHLORIDE SERPL-SCNC: 108 MMOL/L — SIGNIFICANT CHANGE UP (ref 96–108)
CO2 SERPL-SCNC: 24 MMOL/L — SIGNIFICANT CHANGE UP (ref 22–31)
CREAT SERPL-MCNC: 0.73 MG/DL — SIGNIFICANT CHANGE UP (ref 0.5–1.3)
CULTURE RESULTS: SIGNIFICANT CHANGE UP
CULTURE RESULTS: SIGNIFICANT CHANGE UP
GLUCOSE SERPL-MCNC: 103 MG/DL — HIGH (ref 70–99)
HCT VFR BLD CALC: 40.7 % — SIGNIFICANT CHANGE UP (ref 39–50)
HGB BLD-MCNC: 13.9 G/DL — SIGNIFICANT CHANGE UP (ref 13–17)
MCHC RBC-ENTMCNC: 31.7 PG — SIGNIFICANT CHANGE UP (ref 27–34)
MCHC RBC-ENTMCNC: 34.2 GM/DL — SIGNIFICANT CHANGE UP (ref 32–36)
MCV RBC AUTO: 92.9 FL — SIGNIFICANT CHANGE UP (ref 80–100)
METHOD TYPE: SIGNIFICANT CHANGE UP
NRBC # BLD: 0 /100 WBCS — SIGNIFICANT CHANGE UP (ref 0–0)
ORGANISM # SPEC MICROSCOPIC CNT: SIGNIFICANT CHANGE UP
PLATELET # BLD AUTO: 117 K/UL — LOW (ref 150–400)
POTASSIUM SERPL-MCNC: 3.4 MMOL/L — LOW (ref 3.5–5.3)
POTASSIUM SERPL-SCNC: 3.4 MMOL/L — LOW (ref 3.5–5.3)
RBC # BLD: 4.38 M/UL — SIGNIFICANT CHANGE UP (ref 4.2–5.8)
RBC # FLD: 12.7 % — SIGNIFICANT CHANGE UP (ref 10.3–14.5)
SODIUM SERPL-SCNC: 140 MMOL/L — SIGNIFICANT CHANGE UP (ref 135–145)
SPECIMEN SOURCE: SIGNIFICANT CHANGE UP
SPECIMEN SOURCE: SIGNIFICANT CHANGE UP
WBC # BLD: 7.21 K/UL — SIGNIFICANT CHANGE UP (ref 3.8–10.5)
WBC # FLD AUTO: 7.21 K/UL — SIGNIFICANT CHANGE UP (ref 3.8–10.5)

## 2019-04-27 RX ORDER — POTASSIUM CHLORIDE 20 MEQ
40 PACKET (EA) ORAL ONCE
Qty: 0 | Refills: 0 | Status: COMPLETED | OUTPATIENT
Start: 2019-04-27 | End: 2019-04-27

## 2019-04-27 RX ADMIN — PIPERACILLIN AND TAZOBACTAM 25 GRAM(S): 4; .5 INJECTION, POWDER, LYOPHILIZED, FOR SOLUTION INTRAVENOUS at 14:30

## 2019-04-27 RX ADMIN — PANTOPRAZOLE SODIUM 40 MILLIGRAM(S): 20 TABLET, DELAYED RELEASE ORAL at 05:30

## 2019-04-27 RX ADMIN — PIPERACILLIN AND TAZOBACTAM 25 GRAM(S): 4; .5 INJECTION, POWDER, LYOPHILIZED, FOR SOLUTION INTRAVENOUS at 05:30

## 2019-04-27 RX ADMIN — Medication 40 MILLIEQUIVALENT(S): at 11:19

## 2019-04-27 RX ADMIN — Medication 5 MILLIGRAM(S): at 18:32

## 2019-04-27 RX ADMIN — PIPERACILLIN AND TAZOBACTAM 25 GRAM(S): 4; .5 INJECTION, POWDER, LYOPHILIZED, FOR SOLUTION INTRAVENOUS at 21:00

## 2019-04-27 RX ADMIN — Medication 5 MILLIGRAM(S): at 05:31

## 2019-04-27 NOTE — PROGRESS NOTE ADULT - ASSESSMENT
49 yr. old male w/ PMH GERD, OA Lumbar Disc Herniation, elevated PSA who underwent prostate biopsy on 4/23 for which he was pretreated with oral antibiotics admitted on 4/25 for evaluation of shaking chills and fever to 103. Notes blood in urine and mild bilateral flank pain. No other specific complaints, is feeling better since admission.  1. Patient admitted with sepsis secondary to urinary origin due to E coli  - most likely patient has prostatitis  - iv hydration and supportive care   - serial cbc and monitor temperature   - reviewed prior medical records to evaluate for resistant or atypical pathogens   - day #2-3 zosyn  - tolerating antibiotics without rashes or side effects   - will repeat blood cultures in am  - follow up cultures for sensitivity; hopefully will be able to discharge in next 24-48 hours on po antibiotics  2. other issues: per medicine

## 2019-04-27 NOTE — PROGRESS NOTE ADULT - SUBJECTIVE AND OBJECTIVE BOX
cc: Fever/ UTI/ possible pyelo	  History of Present Illness: 	  49 yr. old male w/ PMH GERD, OA Lumbar Disc Herniation, prostate issues w/ s/p biopsy 4/23 was given oral abx Bactrim and Cefuroxime, developed fevers at home.  Patirnt states she developed fever at home 103 and since recent procedure, he presented to ER.  He admits to hematuria in urine initally when he urinates, and then the rest of the urine runs clean.  He also admits to b/l flank pains. Currently he denies any fevers, chills, chest pain, n/v/d.    4/26: Pt feeling better. Had fever 102 last night but currently afebrile.  Positive bacteremia gram neg.  4/27: Doing well, afebrile. Awaiting sensitivities for Ecoli.   No fever, chills, n, v.    REVIEW OF SYSTEMS: All other review of systems is negative unless indicated above.    Vital Signs Last 24 Hrs  T(C): 37.1 (27 Apr 2019 11:33), Max: 37.4 (26 Apr 2019 21:27)  T(F): 98.7 (27 Apr 2019 11:33), Max: 99.3 (26 Apr 2019 21:27)  HR: 74 (27 Apr 2019 11:33) (74 - 80)  BP: 115/71 (27 Apr 2019 11:33) (108/67 - 116/48)  BP(mean): --  RR: 18 (27 Apr 2019 11:33) (18 - 19)  SpO2: 74% (27 Apr 2019 11:33) (74% - 96%)    PHYSICAL EXAM:    Constitutional: NAD, awake and alert, well-developed  HEENT: PERR, EOMI, Normal Hearing, MMM  Neck: Soft and supple  Respiratory: Breath sounds are clear bilaterally, No wheezing, rales or rhonchi  Cardiovascular: S1 and S2, regular rate and rhythm, no Murmurs, gallops or rubs  Gastrointestinal: Bowel Sounds present, soft, nontender, nondistended, no guarding, no rebound  Extremities: No peripheral edema  Neurological: A/O x 3, no focal deficits in my limited exam  Skin: No rashes    MEDICATIONS  (STANDING):  docusate sodium 100 milliGRAM(s) Oral three times a day  pantoprazole    Tablet 40 milliGRAM(s) Oral before breakfast  pindolol 5 milliGRAM(s) Oral every 12 hours  piperacillin/tazobactam IVPB. 3.375 Gram(s) IV Intermittent every 8 hours    MEDICATIONS  (PRN):  acetaminophen   Tablet .. 650 milliGRAM(s) Oral every 6 hours PRN Temp greater or equal to 38C (100.4F), Mild Pain (1 - 3)  ketorolac   Injectable 15 milliGRAM(s) IV Push every 6 hours PRN Moderate Pain (4 - 6)  morphine  - Injectable 2 milliGRAM(s) IV Push every 4 hours PRN Severe Pain (7 - 10)  ondansetron Injectable 4 milliGRAM(s) IV Push every 6 hours PRN Nausea                              13.9   7.21  )-----------( 117      ( 27 Apr 2019 07:33 )             40.7     04-27    140  |  108  |  7   ----------------------------<  103<H>  3.4<L>   |  24  |  0.73    Ca    8.5      27 Apr 2019 07:33      CAPILLARY BLOOD GLUCOSE      Assessment and Plan:  49 yr. old male w/ PMH GERD, OA Lumbar Disc Herniation, prostate issues w/ s/p biopsy 4/23 was given oral abx Bactrim and Cefuroxime, developed fevers at home.      1-sepsis secondary to prostatitis, uti:  -c/w IV zosyn  -BCx with E coli  -infectious disease consult appreciated  -urology consult with dr. leonardo  -IVFs stop further  -supportive care    DVT PPX:  -ambulation

## 2019-04-27 NOTE — PROGRESS NOTE ADULT - SUBJECTIVE AND OBJECTIVE BOX
Patient is a 49y old  Male who presents with a chief complaint of Fever/ UTI/ possible pyelo (25 Apr 2019 15:21)      Date of service: 04-27-19 @ 13:30    Patient lying in bed; no complaint, no hematuria; afebrile        ROS: no fever or chills; denies dizziness, no HA, no SOB or cough, no abdominal pain, no diarrhea or constipation; no dysuria, no urinary frequency, no legs pain, no rashes    MEDICATIONS  (STANDING):  docusate sodium 100 milliGRAM(s) Oral three times a day  pantoprazole    Tablet 40 milliGRAM(s) Oral before breakfast  pindolol 5 milliGRAM(s) Oral every 12 hours  piperacillin/tazobactam IVPB. 3.375 Gram(s) IV Intermittent every 8 hours    MEDICATIONS  (PRN):  acetaminophen   Tablet .. 650 milliGRAM(s) Oral every 6 hours PRN Temp greater or equal to 38C (100.4F), Mild Pain (1 - 3)  ketorolac   Injectable 15 milliGRAM(s) IV Push every 6 hours PRN Moderate Pain (4 - 6)  morphine  - Injectable 2 milliGRAM(s) IV Push every 4 hours PRN Severe Pain (7 - 10)  ondansetron Injectable 4 milliGRAM(s) IV Push every 6 hours PRN Nausea      Vital Signs Last 24 Hrs  T(C): 37.1 (27 Apr 2019 11:33), Max: 37.4 (26 Apr 2019 21:27)  T(F): 98.7 (27 Apr 2019 11:33), Max: 99.3 (26 Apr 2019 21:27)  HR: 74 (27 Apr 2019 11:33) (74 - 80)  BP: 115/71 (27 Apr 2019 11:33) (108/67 - 116/48)  BP(mean): --  RR: 18 (27 Apr 2019 11:33) (18 - 19)  SpO2: 74% (27 Apr 2019 11:33) (74% - 96%)    Physical Exam:        Constitutional: frail looking  HEENT: NC/AT, EOMI, PERRLA, conjunctivae clear; ears and nose atraumatic; pharynx clear  Neck: supple; thyroid not palpable  Back: no tenderness  Respiratory: respiratory effort normal; clear to auscultation  Cardiovascular: S1S2 regular, no murmurs  Abdomen: soft, not tender, not distended, positive BS; no liver or spleen organomegaly  Genitourinary: no suprapubic tenderness  Musculoskeletal: no muscle tenderness, no joint swelling or tenderness  Neurological/ Psychiatric: AxOx3, judgement and insight normal;  moving all extremities  Skin: no rashes; no palpable lesions    Labs: all available labs reviewed                        Labs:                        13.9   7.21  )-----------( 117      ( 27 Apr 2019 07:33 )             40.7     04-27    140  |  108  |  7   ----------------------------<  103<H>  3.4<L>   |  24  |  0.73    Ca    8.5      27 Apr 2019 07:33             Cultures:       Culture - Urine (collected 04-25-19 @ 11:11)  Source: .Urine None  Final Report (04-26-19 @ 08:36):    No growth    Culture - Blood (collected 04-25-19 @ 11:11)  Source: .Blood None  Gram Stain (04-26-19 @ 01:21):    Growth in aerobic bottle: Gram Negative Rods    Growth in anaerobic bottle: Gram Negative Rods  Preliminary Report (04-26-19 @ 21:51):    Growth in aerobic and anaerobic bottles: Escherichia coli    "Due to technical problems, Proteus sp. will Not be reported as part of    the BCID panel until further notice"    ***Blood Panel PCR results on this specimen are available    approximately 3 hours after the Gram stain result.***    Gram stain, PCR, and/or culture results may not always    correspond due to difference in methodologies.    ************************************************************    This PCR assay was performed using [employer].    The following targets are tested for: Enterococcus,    vancomycin resistant enterococci, Listeria monocytogenes,    coagulase negative staphylococci, S. aureus,    methicillin resistant S. aureus, Streptococcus agalactiae    (Group B), S. pneumoniae, S.pyogenes (Group A),    Acinetobacter baumannii, Enterobacter cloacae, E. coli,    Klebsiella oxytoca, K. pneumoniae, Proteus sp.,    Serratia marcescens, Haemophilus influenzae,    Neisseria meningitidis, Pseudomonas aeruginosa, Candida    albicans, C. glabrata, C krusei, C parapsilosis,    C. tropicalis and the KPC resistance gene.  Organism: Blood Culture PCR (04-26-19 @ 03:27)  Organism: Blood Culture PCR (04-26-19 @ 03:27)      -  Escherichia coli: Detec      Method Type: PCR    Culture - Blood (collected 04-25-19 @ 11:11)  Source: .Blood None  Gram Stain (04-26-19 @ 01:21):    Growth in aerobic bottle: Gram Negative Rods    Growth in anaerobic bottle: Gram Negative Rods  Preliminary Report (04-26-19 @ 22:02):    Growth in aerobic and anaerobic bottles: Gram Negative Rods    See previous culture [id number]                Radiology: all available radiological tests reviewed    Advanced directives addressed: full resuscitation

## 2019-04-27 NOTE — CONSULT NOTE ADULT - ASSESSMENT
49 yr. old male w/ PMH GERD, OA Lumbar Disc Herniation, presented to Urology with elevated PSA of 5.3  and a family history of prostate cancer.  He was given oral abx Bactrim and Cefuroxime commencing one day prior to the biopsy, fleets enemas and IM amikacin 750 mg one hour prior to the procedure on 4/23/19   He developed fever at home 103 on 4/25/19 then he presented to ER.  He has initial hematuria as expected post PNB    He also admits to b/l flank pains. CT scan revealed renal cysts and a 1.6 cm lesion indeteriminate in LLP   This will be further evaluated as outpatient. (patient reports prior workup negative)  No fever after admission, wbc normal.      urine culture negative  blood culture E Coli, awaiting sensitivity profile  seen by ID on IV Zosyn 	      Anticipate discharge home on oral antibiotics tomorrow (pending sens)   Followup in my office for PNB pathology report and further evaluation of 1.6 cm indeterminate LLP lesion seen in left kidney on CT

## 2019-04-27 NOTE — CONSULT NOTE ADULT - SUBJECTIVE AND OBJECTIVE BOX
49 yr. old male w/ PMH GERD, OA Lumbar Disc Herniation, presented to Urology with elevated PSA of 5.3  and a family history of prostate cancer.  He was given oral abx Bactrim and Cefuroxime commencing one day prior to the biopsy, fleets enemas and IM amikacin 750 mg one hour prior to the procedure on 19   He developed fever at home 103 on 19 then he presented to ER.  He has initial hematuria as expected post PNB    He also admits to b/l flank pains. CT scan revealed renal cysts and a 1.6 cm lesion indeteriminate in LLP   This will be further evaluated as outpatient. (patient reports prior workup negative)  No fever after admission, wbc normal.      urine culture negative  blood culture E Coli, awaiting sensitivity profile  seen by ID on IV Zosyn 	         Review of Systems:  Other Review of Systems: All other review of systems negative, except as noted in HPI 	      Allergies and Intolerances:        Allergies:  	adhesives: Miscellaneous, Rash  	hayfever: Miscellaneous, Rhinitis, hayfever  	ketamine: Drug, Other, panic attack, anxiety reaction  	Synvisc: Drug, Swelling  	Originally Entered as [Rash] reaction to [adhesive tape]: Miscellaneous, Unknown, Originally Entered as [Rash] reaction to [adhesive tape]    Home Medications:   * Patient Currently Takes Medications as of 27-Mar-2018 13:13 documented in Structured Notes  · 	pindolol 5 mg oral tablet: 1 tab(s) orally 2 times a day  · 	Tums 500 mg oral tablet, chewable: 1 tab(s) orally once a day    Patient History:    Past Medical, Past Surgical, and Family History:  PAST MEDICAL HISTORY:  Gastroesophageal reflux disease without esophagitis on occasion    Knee pain, left     Lumbar disc herniation L4-5, asymptomatic    NSVT (nonsustained ventricular tachycardia)     Osteoarthritis of left knee     Palpitations.     PAST SURGICAL HISTORY:  S/P ablation of ventricular arrhythmia     S/P hemorrhoidectomy     S/P knee surgery 1984, , , ,     S/P laparoscopy     S/P LASIK surgery     S/P shoulder surgery ,     S/P tonsillectomy and adenoidectomy 1973    Status post total left knee replacement .     FAMILY HISTORY:  Mother  Still living? Yes, Estimated age: 61-70  Family history of alcoholism, Age at diagnosis: Age Unknown  Family history of cervical cancer, Age at diagnosis: 31-40    Grandparent  Still living? No  Family history of alcoholism, Age at diagnosis: Age Unknown.     Social History:  Social History (marital status, living situation, occupation, tobacco use, alcohol and drug use, and sexual history): Patient is a , , with kids; no illicit drug use, no etoh use	     Tobacco Screening:  · Core Measure Site	Yes	  · Has the patient used tobacco in the past 30 days?	No 	    Risk Assessment:    Present on Admission:  Deep Venous Thrombosis	no 	  Pulmonary Embolus	no 	     Heart Failure:  Does this patient have a history of or has been diagnosed with heart failure? no.    HIV Screen (per Health system Department of Health, HIV screening must be offered to every individual between ages 13 and 64)	Offered and patient declined 	       Physical Exam:    HEENT:   pupils equal and reactive, EOMI, no oropharyngeal lesions, erythema, exudates, oral thrush   NECK:   supple, no carotid bruits, no palpable lymph nodes, no thyromegaly   CV:  +S1, +S2, regular, no murmurs or rubs   RESP:   lungs clear to auscultation bilaterally, no wheezing, rales, rhonchi, good air entry bilaterally   BREAST:  not examined   GI:  abdomen soft, non-tender, non-distended, normal BS, no bruits, no abdominal masses, no palpable masses   RECTAL:  not examined   :  normal testes bilaterally   MSK:   normal muscle tone, no atrophy, no rigidity, no contractions   EXT:   no clubbing, no cyanosis, no edema, no calf pain, swelling or erythema   VASCULAR:  pulses equal and symmetric in the upper and lower extremities   NEURO:  AAOX3, no focal neurological deficits, follows all commands, able to move extremities spontaneously   SKIN:  no ulcers, lesions or rashes	       Labs and Results:  Labs, Radiology, Cardiology, and Other Results: Urinalysis Basic - ( 2019 11:11 )   Color: Barbara / Appearance: very cloudy / S.010 / pH: x  Gluc: x / Ketone: Negative  / Bili: Negative / Urobili: Negative mg/dL   Blood: x / Protein: 30 mg/dL / Nitrite: Negative   Leuk Esterase: Trace / RBC: >50 /HPF / WBC 3-5   Sq Epi: x / Non Sq Epi: x / Bacteria: Few   2019 11:11   139    |  106    |  14     ----------------------------<  91     3.6     |  25     |  1.16    Ca    9.1        2019 11:11   TPro  7.6    /  Alb  4.3    /  TBili  1.0    /  DBili  x      /  AST  17     /  ALT  34     /  AlkPhos  31     2019 11:11  LIVER FUNCTIONS - ( 2019 11:11 )  Alb: 4.3 g/dL / Pro: 7.6 gm/dL / ALK PHOS: 31 U/L / ALT: 34 U/L / AST: 17 U/L / GGT: x         PT/INR - ( 2019 11:11 )   PT: 13.7 sec;   INR: 1.23 ratio        PTT - ( 2019 11:11 )  PTT:28.9 secCBC Full  -  ( 2019 11:11 )  WBC Count : 9.68 K/uL  Hemoglobin : 16.2 g/dL  Hematocrit : 45.6 %  Platelet Count - Automated : 145 K/uL  Mean Cell Volume : 90.1 fl  Mean Cell Hemoglobin : 32.0 pg  Mean Cell Hemoglobin Concentration : 35.5 gm/dL  Auto Neutrophil # : 9.10 K/uL  Auto Lymphocyte # : 0.39 K/uL  Auto Monocyte # : 0.10 K/uL  Auto Eosinophil # : 0.00 K/uL  Auto Basophil # : 0.00 K/uL  Auto Neutrophil % : 83.0 %  Auto Lymphocyte % : 4.0 %  Auto Monocyte % : 1.0 %  Auto Eosinophil % : 0.0 % Auto Basophil % : 0.0 %

## 2019-04-28 ENCOUNTER — TRANSCRIPTION ENCOUNTER (OUTPATIENT)
Age: 49
End: 2019-04-28

## 2019-04-28 VITALS
DIASTOLIC BLOOD PRESSURE: 82 MMHG | OXYGEN SATURATION: 98 % | TEMPERATURE: 98 F | SYSTOLIC BLOOD PRESSURE: 125 MMHG | HEART RATE: 72 BPM | RESPIRATION RATE: 16 BRPM

## 2019-04-28 RX ORDER — ACETAMINOPHEN 500 MG
2 TABLET ORAL
Qty: 0 | Refills: 0 | DISCHARGE
Start: 2019-04-28

## 2019-04-28 RX ORDER — CEFUROXIME AXETIL 250 MG
1 TABLET ORAL
Qty: 24 | Refills: 0
Start: 2019-04-28 | End: 2019-05-09

## 2019-04-28 RX ADMIN — PIPERACILLIN AND TAZOBACTAM 25 GRAM(S): 4; .5 INJECTION, POWDER, LYOPHILIZED, FOR SOLUTION INTRAVENOUS at 06:26

## 2019-04-28 RX ADMIN — PANTOPRAZOLE SODIUM 40 MILLIGRAM(S): 20 TABLET, DELAYED RELEASE ORAL at 06:26

## 2019-04-28 RX ADMIN — Medication 5 MILLIGRAM(S): at 06:26

## 2019-04-28 NOTE — DISCHARGE NOTE PROVIDER - CARE PROVIDER_API CALL
Sandro Douglas)  Urology  [address] [place] [address]  Phone: [phone]  Fax: [phone]  Follow Up Time: 2 weeks

## 2019-04-28 NOTE — DISCHARGE NOTE PROVIDER - NSDCCPCAREPLAN_GEN_ALL_CORE_FT
PRINCIPAL DISCHARGE DIAGNOSIS  Diagnosis: Prostatitis  Assessment and Plan of Treatment: with E coli bacteremia.  Continue antibiotics for 14 day course treatment.  May take with probiotic.

## 2019-04-28 NOTE — DISCHARGE NOTE PROVIDER - HOSPITAL COURSE
cc: Fever/ UTI/ possible pyelo    History of Present Illness:     49 yr. old male w/ PMH GERD, OA Lumbar Disc Herniation, prostate issues w/ s/p biopsy 4/23 was given oral abx Bactrim and Cefuroxime, developed fevers at home.    Patirnt states she developed fever at home 103 and since recent procedure, he presented to ER.  He admits to hematuria in urine initally when he urinates, and then the rest of the urine runs clean.    He also admits to b/l flank pains. Currently he denies any fevers, chills, chest pain, n/v/d.        4/26: Pt feeling better. Had fever 102 last night but currently afebrile.  Positive bacteremia gram neg.    4/27: Doing well, afebrile. Awaiting sensitivities for Ecoli.   No fever, chills, n, v.    4/28: Pt feels well, eager to go home.  He will complete 14 day course of antibiotics for E coli bacteremia of urinary source.  Pt without dysuria, fever, chills, n, v.        REVIEW OF SYSTEMS: All other review of systems is negative unless indicated above.        Vital Signs Last 24 Hrs    T(C): 36.9 (28 Apr 2019 05:36), Max: 37.2 (27 Apr 2019 16:19)    T(F): 98.4 (28 Apr 2019 05:36), Max: 98.9 (27 Apr 2019 16:19)    HR: 70 (28 Apr 2019 05:36) (70 - 74)    BP: 140/69 (28 Apr 2019 05:36) (111/68 - 140/69)    BP(mean): --    RR: 20 (28 Apr 2019 05:36) (18 - 20)    SpO2: 97% (28 Apr 2019 05:36) (97% - 98%)        PHYSICAL EXAM:        Constitutional: NAD, awake and alert, well-developed    HEENT: PERR, EOMI, Normal Hearing, MMM    Neck: Soft and supple    Respiratory: Breath sounds are clear bilaterally, No wheezing, rales or rhonchi    Cardiovascular: S1 and S2, regular rate and rhythm, no Murmurs, gallops or rubs    Gastrointestinal: Bowel Sounds present, soft, nontender, nondistended, no guarding, no rebound    Extremities: No peripheral edema    Neurological: A/O x 3, no focal deficits in my limited exam    Skin: No rashes        meds/labs: Reviewed        Assessment and Plan:  49 yr. old male w/ PMH GERD, OA Lumbar Disc Herniation, prostate issues w/ s/p biopsy 4/23 was given oral abx Bactrim and Cefuroxime, developed fevers at home.            1-sepsis secondary to prostatitis, bacteremia and uti:    -c/w IV zosyn and transition to ceftin for 14 day course.    -BCx with E coli    -infectious disease consult appreciated    -follow up outpatient urologist.    -supportive care        Attending Statement: 40 minutes spent on total encounter and discharge planning.

## 2019-04-28 NOTE — DISCHARGE NOTE PROVIDER - PROVIDER RX CONTACT NUMBER
[phone] Pt presents with a moderate-severe oral dysphagia and mild-moderate pharyngeal dysphagia characterized by poor bolus formation, premature spillover of material to the oropharynx and hypopharynx, delayed pharyngeal swallows and laryngeal penetration on thin puree, honey thick liquid, nectar thick liquid and thin liquid. No aspiration on exam. Solid food not administered 2/2 reduced oral management skills.   Swallow disorders: reduced lingual strength/ROM/coordination, reduced base of tongue to posterior pharyngeal wall contact, delay in the trigger of the pharyngeal swallow, s/s of reduced supraglottic sensation

## 2019-04-28 NOTE — DISCHARGE NOTE NURSING/CASE MANAGEMENT/SOCIAL WORK - NSDCDPATPORTLINK_GEN_ALL_CORE
You can access the [facility], offered by [facility], by registering with the following website: [email or web address]

## 2019-05-02 LAB
CULTURE RESULTS: SIGNIFICANT CHANGE UP
CULTURE RESULTS: SIGNIFICANT CHANGE UP
SPECIMEN SOURCE: SIGNIFICANT CHANGE UP
SPECIMEN SOURCE: SIGNIFICANT CHANGE UP

## 2019-05-05 DIAGNOSIS — T81.44XA SEPSIS FOLLOWING A PROCEDURE, INITIAL ENCOUNTER: ICD-10-CM

## 2019-05-05 DIAGNOSIS — T81.40XA INFECTION FOLLOWING A PROCEDURE, UNSPECIFIED, INITIAL ENCOUNTER: ICD-10-CM

## 2019-05-05 DIAGNOSIS — N41.9 INFLAMMATORY DISEASE OF PROSTATE, UNSPECIFIED: ICD-10-CM

## 2019-05-05 DIAGNOSIS — A41.9 SEPSIS, UNSPECIFIED ORGANISM: ICD-10-CM

## 2019-05-05 DIAGNOSIS — A41.51 SEPSIS DUE TO ESCHERICHIA COLI [E. COLI]: ICD-10-CM

## 2019-05-05 DIAGNOSIS — N39.0 URINARY TRACT INFECTION, SITE NOT SPECIFIED: ICD-10-CM

## 2019-05-05 DIAGNOSIS — K21.9 GASTRO-ESOPHAGEAL REFLUX DISEASE WITHOUT ESOPHAGITIS: ICD-10-CM
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
## 2019-05-10 ENCOUNTER — APPOINTMENT (OUTPATIENT)
Dept: ULTRASOUND IMAGING | Facility: CLINIC | Age: 49
End: 2019-05-10
Payer: COMMERCIAL

## 2019-05-10 ENCOUNTER — OUTPATIENT (OUTPATIENT)
Dept: OUTPATIENT SERVICES | Facility: HOSPITAL | Age: 49
LOS: 1 days | End: 2019-05-10
Payer: COMMERCIAL

## 2019-05-10 DIAGNOSIS — Z96.652 PRESENCE OF LEFT ARTIFICIAL KNEE JOINT: Chronic | ICD-10-CM

## 2019-05-10 DIAGNOSIS — Z98.89 OTHER SPECIFIED POSTPROCEDURAL STATES: Chronic | ICD-10-CM

## 2019-05-10 DIAGNOSIS — Z00.00 ENCOUNTER FOR GENERAL ADULT MEDICAL EXAMINATION WITHOUT ABNORMAL FINDINGS: ICD-10-CM

## 2019-05-10 PROCEDURE — 76770 US EXAM ABDO BACK WALL COMP: CPT | Mod: 26

## 2019-05-10 PROCEDURE — 76775 US EXAM ABDO BACK WALL LIM: CPT | Mod: 26

## 2019-05-10 PROCEDURE — 76770 US EXAM ABDO BACK WALL COMP: CPT

## 2019-05-10 PROCEDURE — 76775 US EXAM ABDO BACK WALL LIM: CPT

## 2019-05-21 ENCOUNTER — MEDICATION RENEWAL (OUTPATIENT)
Age: 49
End: 2019-05-21

## 2019-06-25 ENCOUNTER — NON-APPOINTMENT (OUTPATIENT)
Age: 49
End: 2019-06-25

## 2019-06-25 ENCOUNTER — APPOINTMENT (OUTPATIENT)
Dept: ELECTROPHYSIOLOGY | Facility: CLINIC | Age: 49
End: 2019-06-25
Payer: COMMERCIAL

## 2019-06-25 VITALS
WEIGHT: 220 LBS | SYSTOLIC BLOOD PRESSURE: 118 MMHG | DIASTOLIC BLOOD PRESSURE: 71 MMHG | BODY MASS INDEX: 31.5 KG/M2 | HEART RATE: 67 BPM | HEIGHT: 70 IN

## 2019-06-25 PROCEDURE — 99214 OFFICE O/P EST MOD 30 MIN: CPT

## 2019-06-25 PROCEDURE — 93000 ELECTROCARDIOGRAM COMPLETE: CPT

## 2019-06-25 NOTE — PHYSICAL EXAM
[General Appearance - Well Developed] : well developed [Normal Appearance] : normal appearance [Well Groomed] : well groomed [No Deformities] : no deformities [General Appearance - Well Nourished] : well nourished [General Appearance - In No Acute Distress] : no acute distress [Normal Conjunctiva] : the conjunctiva exhibited no abnormalities [Eyelids - No Xanthelasma] : the eyelids demonstrated no xanthelasmas [Normal Oral Mucosa] : normal oral mucosa [No Oral Pallor] : no oral pallor [No Oral Cyanosis] : no oral cyanosis [Normal Jugular Venous A Waves Present] : normal jugular venous A waves present [Normal Jugular Venous V Waves Present] : normal jugular venous V waves present [No Jugular Venous Brewer A Waves] : no jugular venous brewer A waves [Auscultation Breath Sounds / Voice Sounds] : lungs were clear to auscultation bilaterally [Exaggerated Use Of Accessory Muscles For Inspiration] : no accessory muscle use [Respiration, Rhythm And Depth] : normal respiratory rhythm and effort [Heart Sounds] : normal S1 and S2 [Heart Rate And Rhythm] : heart rate and rhythm were normal [Murmurs] : no murmurs present [Abdomen Soft] : soft [Abdomen Tenderness] : non-tender [Abdomen Mass (___ Cm)] : no abdominal mass palpated [Gait - Sufficient For Exercise Testing] : the gait was sufficient for exercise testing [Abnormal Walk] : normal gait [Nail Clubbing] : no clubbing of the fingernails [Cyanosis, Localized] : no localized cyanosis [Petechial Hemorrhages (___cm)] : no petechial hemorrhages [Skin Color & Pigmentation] : normal skin color and pigmentation [] : no rash [No Venous Stasis] : no venous stasis [Skin Lesions] : no skin lesions [No Xanthoma] : no  xanthoma was observed [No Skin Ulcers] : no skin ulcer [Oriented To Time, Place, And Person] : oriented to person, place, and time [Mood] : the mood was normal [Affect] : the affect was normal [No Anxiety] : not feeling anxious

## 2019-06-26 NOTE — HISTORY OF PRESENT ILLNESS
[FreeTextEntry1] : This is a 49-year-old gentleman with a history of PVCs and palpitations who presents for followup. He reports that his PVCs are well controlled with only rare episodes of palpitations. He was hospitalized for E Coli sepsis earlier in the year after a prostate biopsy. \par \par DINORA WESTON  denies chest pain, chest pressure, shortness of breath, lightheadedness, dizziness, recent palpitations, syncope, presyncope, orthopnea, PND, or edema.

## 2019-06-26 NOTE — ASSESSMENT
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

## 2019-09-09 ENCOUNTER — APPOINTMENT (OUTPATIENT)
Dept: ORTHOPEDIC SURGERY | Facility: CLINIC | Age: 49
End: 2019-09-09
Payer: OTHER MISCELLANEOUS

## 2019-09-09 VITALS — HEIGHT: 70 IN | WEIGHT: 220 LBS | BODY MASS INDEX: 31.5 KG/M2

## 2019-09-09 PROCEDURE — 20610 DRAIN/INJ JOINT/BURSA W/O US: CPT | Mod: RT

## 2019-09-09 PROCEDURE — 99214 OFFICE O/P EST MOD 30 MIN: CPT | Mod: 25

## 2019-10-02 ENCOUNTER — TRANSCRIPTION ENCOUNTER (OUTPATIENT)
Age: 49
End: 2019-10-02

## 2019-10-24 ENCOUNTER — APPOINTMENT (OUTPATIENT)
Dept: ORTHOPEDIC SURGERY | Facility: CLINIC | Age: 49
End: 2019-10-24

## 2019-11-14 ENCOUNTER — APPOINTMENT (OUTPATIENT)
Dept: ORTHOPEDIC SURGERY | Facility: CLINIC | Age: 49
End: 2019-11-14
Payer: OTHER MISCELLANEOUS

## 2019-11-14 PROCEDURE — 99214 OFFICE O/P EST MOD 30 MIN: CPT | Mod: 25

## 2019-11-14 PROCEDURE — 20610 DRAIN/INJ JOINT/BURSA W/O US: CPT | Mod: RT

## 2019-11-27 ENCOUNTER — APPOINTMENT (OUTPATIENT)
Dept: ORTHOPEDIC SURGERY | Facility: CLINIC | Age: 49
End: 2019-11-27
Payer: OTHER MISCELLANEOUS

## 2019-11-27 VITALS — WEIGHT: 220 LBS | BODY MASS INDEX: 31.5 KG/M2 | HEIGHT: 70 IN

## 2019-11-27 PROCEDURE — 99214 OFFICE O/P EST MOD 30 MIN: CPT

## 2019-12-11 ENCOUNTER — APPOINTMENT (OUTPATIENT)
Dept: ORTHOPEDIC SURGERY | Facility: CLINIC | Age: 49
End: 2019-12-11
Payer: OTHER MISCELLANEOUS

## 2019-12-11 PROCEDURE — 99214 OFFICE O/P EST MOD 30 MIN: CPT | Mod: 25

## 2019-12-11 PROCEDURE — 20610 DRAIN/INJ JOINT/BURSA W/O US: CPT | Mod: RT

## 2019-12-18 ENCOUNTER — RESULT REVIEW (OUTPATIENT)
Age: 49
End: 2019-12-18

## 2019-12-20 ENCOUNTER — APPOINTMENT (OUTPATIENT)
Dept: ULTRASOUND IMAGING | Facility: CLINIC | Age: 49
End: 2019-12-20
Payer: COMMERCIAL

## 2019-12-20 ENCOUNTER — OUTPATIENT (OUTPATIENT)
Dept: OUTPATIENT SERVICES | Facility: HOSPITAL | Age: 49
LOS: 1 days | End: 2019-12-20
Payer: COMMERCIAL

## 2019-12-20 DIAGNOSIS — Z98.89 OTHER SPECIFIED POSTPROCEDURAL STATES: Chronic | ICD-10-CM

## 2019-12-20 DIAGNOSIS — Z00.8 ENCOUNTER FOR OTHER GENERAL EXAMINATION: ICD-10-CM

## 2019-12-20 DIAGNOSIS — Z96.652 PRESENCE OF LEFT ARTIFICIAL KNEE JOINT: Chronic | ICD-10-CM

## 2019-12-20 PROCEDURE — 76775 US EXAM ABDO BACK WALL LIM: CPT | Mod: 26

## 2019-12-20 PROCEDURE — 76775 US EXAM ABDO BACK WALL LIM: CPT

## 2019-12-30 ENCOUNTER — APPOINTMENT (OUTPATIENT)
Dept: ORTHOPEDIC SURGERY | Facility: CLINIC | Age: 49
End: 2019-12-30
Payer: OTHER MISCELLANEOUS

## 2019-12-30 PROCEDURE — 99214 OFFICE O/P EST MOD 30 MIN: CPT

## 2020-01-07 ENCOUNTER — NON-APPOINTMENT (OUTPATIENT)
Age: 50
End: 2020-01-07

## 2020-01-07 ENCOUNTER — APPOINTMENT (OUTPATIENT)
Dept: ELECTROPHYSIOLOGY | Facility: CLINIC | Age: 50
End: 2020-01-07
Payer: COMMERCIAL

## 2020-01-07 VITALS
BODY MASS INDEX: 31.5 KG/M2 | WEIGHT: 220 LBS | HEART RATE: 82 BPM | SYSTOLIC BLOOD PRESSURE: 132 MMHG | OXYGEN SATURATION: 97 % | HEIGHT: 70 IN | DIASTOLIC BLOOD PRESSURE: 73 MMHG

## 2020-01-07 DIAGNOSIS — R33.9 RETENTION OF URINE, UNSPECIFIED: ICD-10-CM

## 2020-01-07 DIAGNOSIS — Z82.49 FAMILY HISTORY OF ISCHEMIC HEART DISEASE AND OTHER DISEASES OF THE CIRCULATORY SYSTEM: ICD-10-CM

## 2020-01-07 PROCEDURE — 99215 OFFICE O/P EST HI 40 MIN: CPT

## 2020-01-07 PROCEDURE — 93000 ELECTROCARDIOGRAM COMPLETE: CPT

## 2020-01-07 RX ORDER — MULTIVITAMIN
TABLET ORAL
Refills: 0 | Status: ACTIVE | COMMUNITY

## 2020-01-07 RX ORDER — BISMUTH SUBSALICYLATE 525 MG/1
TABLET ORAL
Refills: 0 | Status: DISCONTINUED | COMMUNITY
End: 2020-01-07

## 2020-01-07 RX ORDER — HYALURONATE SODIUM, STABILIZED 88 MG/4 ML
88 SYRINGE (ML) INTRAARTICULAR
Qty: 1 | Refills: 0 | Status: DISCONTINUED | COMMUNITY
Start: 2019-12-30 | End: 2020-01-07

## 2020-01-07 RX ORDER — HYALURONATE SODIUM, STABILIZED 88 MG/4 ML
88 SYRINGE (ML) INTRAARTICULAR
Qty: 1 | Refills: 0 | Status: DISCONTINUED | COMMUNITY
Start: 2019-10-21 | End: 2020-01-07

## 2020-01-07 RX ORDER — MOMETASONE FUROATE MONOHYDRATE 50 UG/1
50 SPRAY, METERED NASAL
Refills: 0 | Status: DISCONTINUED | COMMUNITY
End: 2020-01-07

## 2020-01-07 RX ORDER — DICLOFENAC SODIUM 75 MG/1
75 TABLET, DELAYED RELEASE ORAL
Qty: 60 | Refills: 3 | Status: DISCONTINUED | COMMUNITY
Start: 2019-11-27 | End: 2020-01-07

## 2020-01-07 NOTE — PHYSICAL EXAM
[General Appearance - Well Developed] : well developed [Normal Appearance] : normal appearance [Well Groomed] : well groomed [General Appearance - Well Nourished] : well nourished [General Appearance - In No Acute Distress] : no acute distress [Normal Conjunctiva] : the conjunctiva exhibited no abnormalities [No Deformities] : no deformities [Normal Oral Mucosa] : normal oral mucosa [Eyelids - No Xanthelasma] : the eyelids demonstrated no xanthelasmas [No Oral Pallor] : no oral pallor [Normal Jugular Venous A Waves Present] : normal jugular venous A waves present [No Oral Cyanosis] : no oral cyanosis [Normal Jugular Venous V Waves Present] : normal jugular venous V waves present [No Jugular Venous Brewer A Waves] : no jugular venous brewer A waves [Respiration, Rhythm And Depth] : normal respiratory rhythm and effort [Exaggerated Use Of Accessory Muscles For Inspiration] : no accessory muscle use [Heart Rate And Rhythm] : heart rate and rhythm were normal [Auscultation Breath Sounds / Voice Sounds] : lungs were clear to auscultation bilaterally [Murmurs] : no murmurs present [Heart Sounds] : normal S1 and S2 [Abdomen Soft] : soft [Abdomen Tenderness] : non-tender [Abdomen Mass (___ Cm)] : no abdominal mass palpated [Abnormal Walk] : normal gait [Gait - Sufficient For Exercise Testing] : the gait was sufficient for exercise testing [Nail Clubbing] : no clubbing of the fingernails [Petechial Hemorrhages (___cm)] : no petechial hemorrhages [Cyanosis, Localized] : no localized cyanosis [Skin Color & Pigmentation] : normal skin color and pigmentation [] : no rash [Skin Lesions] : no skin lesions [No Venous Stasis] : no venous stasis [No Skin Ulcers] : no skin ulcer [No Xanthoma] : no  xanthoma was observed [Affect] : the affect was normal [Oriented To Time, Place, And Person] : oriented to person, place, and time [Mood] : the mood was normal [No Anxiety] : not feeling anxious

## 2020-01-29 ENCOUNTER — APPOINTMENT (OUTPATIENT)
Dept: ORTHOPEDIC SURGERY | Facility: CLINIC | Age: 50
End: 2020-01-29
Payer: OTHER MISCELLANEOUS

## 2020-01-29 PROCEDURE — 73562 X-RAY EXAM OF KNEE 3: CPT | Mod: RT

## 2020-01-29 PROCEDURE — 99214 OFFICE O/P EST MOD 30 MIN: CPT

## 2020-02-07 NOTE — ASSESSMENT
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 \par \par Episode of transient monocular Blindness. \par Spoke with PA at Dr. Humphrey office. Will order MRI brain. Start low dose ASA. \par Follow up with Dr. Humphrey.

## 2020-02-07 NOTE — HISTORY OF PRESENT ILLNESS
[FreeTextEntry1] : This is a 50-year-old gentleman with a history of PVCs and palpitations who presents for followup. He reports that his PVCs are well controlled with only rare episodes of palpitations. He was hospitalized for E Coli sepsis earlier in the year after a prostate biopsy. 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  \par \par DINORA TRISTA  denies chest pain, chest pressure, shortness of breath, lightheadedness, dizziness, recent palpitations, syncope, presyncope, orthopnea, PND, or edema.

## 2020-03-18 ENCOUNTER — APPOINTMENT (OUTPATIENT)
Dept: ORTHOPEDIC SURGERY | Facility: CLINIC | Age: 50
End: 2020-03-18
Payer: OTHER MISCELLANEOUS

## 2020-03-18 PROCEDURE — 99214 OFFICE O/P EST MOD 30 MIN: CPT | Mod: 25

## 2020-03-18 PROCEDURE — 20610 DRAIN/INJ JOINT/BURSA W/O US: CPT | Mod: RT

## 2020-06-12 ENCOUNTER — APPOINTMENT (OUTPATIENT)
Dept: ULTRASOUND IMAGING | Facility: CLINIC | Age: 50
End: 2020-06-12
Payer: COMMERCIAL

## 2020-06-12 ENCOUNTER — OUTPATIENT (OUTPATIENT)
Dept: OUTPATIENT SERVICES | Facility: HOSPITAL | Age: 50
LOS: 1 days | End: 2020-06-12
Payer: COMMERCIAL

## 2020-06-12 DIAGNOSIS — Z98.89 OTHER SPECIFIED POSTPROCEDURAL STATES: Chronic | ICD-10-CM

## 2020-06-12 DIAGNOSIS — Z96.652 PRESENCE OF LEFT ARTIFICIAL KNEE JOINT: Chronic | ICD-10-CM

## 2020-06-12 DIAGNOSIS — Z00.8 ENCOUNTER FOR OTHER GENERAL EXAMINATION: ICD-10-CM

## 2020-06-12 PROCEDURE — 76775 US EXAM ABDO BACK WALL LIM: CPT | Mod: 26

## 2020-06-12 PROCEDURE — 76775 US EXAM ABDO BACK WALL LIM: CPT

## 2020-06-24 ENCOUNTER — APPOINTMENT (OUTPATIENT)
Dept: COLORECTAL SURGERY | Facility: CLINIC | Age: 50
End: 2020-06-24
Payer: COMMERCIAL

## 2020-06-24 VITALS
SYSTOLIC BLOOD PRESSURE: 125 MMHG | DIASTOLIC BLOOD PRESSURE: 89 MMHG | BODY MASS INDEX: 31.5 KG/M2 | HEART RATE: 69 BPM | RESPIRATION RATE: 14 BRPM | HEIGHT: 70 IN | TEMPERATURE: 97.1 F | WEIGHT: 220 LBS

## 2020-06-24 DIAGNOSIS — Z87.19 PERSONAL HISTORY OF OTHER DISEASES OF THE DIGESTIVE SYSTEM: ICD-10-CM

## 2020-06-24 PROCEDURE — 99243 OFF/OP CNSLTJ NEW/EST LOW 30: CPT | Mod: 25

## 2020-06-24 PROCEDURE — 46320 REMOVAL OF HEMORRHOID CLOT: CPT

## 2020-06-24 NOTE — PROCEDURE
[FreeTextEntry1] : Buttocks were  and taped, operative site was prepped. 1% lidocaine was injected into the affected external hemorrhoid and massaged to allow infiltration of anesthetic. Thrombosed external hemorrhoid was then excised under direct visualization. Monsel solution was applied for hemostasis. Cotton gauze was then placed over the operative site and procedure was then terminated.\par \par

## 2020-06-24 NOTE — ASSESSMENT
[FreeTextEntry1] : Mr. Hicks  presents to the office with a left lateral thrombosed external hemorrhoid. I have discussed the etiology for this condition including excess straining with activity or straining to evacuate stools secondary to constipation or diarrhea. In office today, we proceeded to excise the SONU to good effect. The patient was advised on what to expect post procedure. This includes some mild discomfort which should be readily alleviated by over-the-counter pain medications. Sitz baths will need to be performed to keep the wound site clean to facilitate healing. There will be some mild serosanguineous drainage to be anticipated and this should resolve as the wound heals. Cotton gauze should be placed in undergarments to prevent soilage from drainage. He was also advised to avoid heavy lifting and straining for the next two days to prevent reaccumulation of thrombus.  Worsening bleeding from the site can be addressed by laying in  the left lateral decubitus position while holding pressure at the site.\par

## 2020-06-24 NOTE — PHYSICAL EXAM
[Tender, Swollen] : tender, swollen [Thrombosed] : that was thrombosed [No Rash or Lesion] : No rash or lesion [Alert] : alert [Oriented to Person] : oriented to person [Oriented to Place] : oriented to place [Oriented to Time] : oriented to time [Calm] : calm [de-identified] : No apparent distress [de-identified] : Left lateral [de-identified] : Normocephalic atraumatic [de-identified] : Moving all extremities x4

## 2020-06-24 NOTE — HISTORY OF PRESENT ILLNESS
[FreeTextEntry1] : Mr. Hicks presents to the office for consultation.  He reports that 4 days prior to presentation, he had a larger than usual bowel movement with resultant hemorrhoidal swelling and pain.  Over the last several days, the hemorrhoid has increased in size, and as such has grown progressively symptomatic.  Most recent colonoscopy was in December 2019 and found to be within normal limits.  In the past, he has undergone a hemorrhoidectomy with Dr. Sher in 2014.  Since that time, he has had no issues with bowel movements.

## 2020-06-24 NOTE — CONSULT LETTER
[Dear  ___] : Dear  [unfilled], [Consult Letter:] : I had the pleasure of evaluating your patient, [unfilled]. [Please see my note below.] : Please see my note below. [FreeTextEntry3] : Tali Lizarraga MD\par  [Sincerely,] : Sincerely,

## 2020-07-07 ENCOUNTER — APPOINTMENT (OUTPATIENT)
Dept: ELECTROPHYSIOLOGY | Facility: CLINIC | Age: 50
End: 2020-07-07
Payer: COMMERCIAL

## 2020-07-07 ENCOUNTER — NON-APPOINTMENT (OUTPATIENT)
Age: 50
End: 2020-07-07

## 2020-07-07 VITALS
HEART RATE: 78 BPM | BODY MASS INDEX: 31.5 KG/M2 | WEIGHT: 220 LBS | HEIGHT: 70 IN | OXYGEN SATURATION: 97 % | SYSTOLIC BLOOD PRESSURE: 127 MMHG | DIASTOLIC BLOOD PRESSURE: 84 MMHG

## 2020-07-07 PROCEDURE — 99214 OFFICE O/P EST MOD 30 MIN: CPT

## 2020-07-07 PROCEDURE — 93000 ELECTROCARDIOGRAM COMPLETE: CPT | Mod: NC

## 2020-07-14 NOTE — PHYSICAL EXAM
[General Appearance - Well Developed] : well developed [Normal Appearance] : normal appearance [Well Groomed] : well groomed [General Appearance - Well Nourished] : well nourished [No Deformities] : no deformities [Normal Conjunctiva] : the conjunctiva exhibited no abnormalities [General Appearance - In No Acute Distress] : no acute distress [Eyelids - No Xanthelasma] : the eyelids demonstrated no xanthelasmas [Normal Oral Mucosa] : normal oral mucosa [No Oral Cyanosis] : no oral cyanosis [No Oral Pallor] : no oral pallor [Normal Jugular Venous V Waves Present] : normal jugular venous V waves present [Normal Jugular Venous A Waves Present] : normal jugular venous A waves present [No Jugular Venous Brewer A Waves] : no jugular venous brewer A waves [Exaggerated Use Of Accessory Muscles For Inspiration] : no accessory muscle use [Respiration, Rhythm And Depth] : normal respiratory rhythm and effort [Heart Rate And Rhythm] : heart rate and rhythm were normal [Auscultation Breath Sounds / Voice Sounds] : lungs were clear to auscultation bilaterally [Heart Sounds] : normal S1 and S2 [Murmurs] : no murmurs present [Abdomen Soft] : soft [Abdomen Tenderness] : non-tender [Abdomen Mass (___ Cm)] : no abdominal mass palpated [Abnormal Walk] : normal gait [Gait - Sufficient For Exercise Testing] : the gait was sufficient for exercise testing [Nail Clubbing] : no clubbing of the fingernails [Cyanosis, Localized] : no localized cyanosis [Petechial Hemorrhages (___cm)] : no petechial hemorrhages [Skin Color & Pigmentation] : normal skin color and pigmentation [No Venous Stasis] : no venous stasis [Skin Lesions] : no skin lesions [] : no rash [Oriented To Time, Place, And Person] : oriented to person, place, and time [No Skin Ulcers] : no skin ulcer [No Xanthoma] : no  xanthoma was observed [Affect] : the affect was normal [Mood] : the mood was normal [No Anxiety] : not feeling anxious

## 2020-07-14 NOTE — ASSESSMENT
[FreeTextEntry1] : This is a 50-year-old gentleman with RVOT morphology  PVCs with stable symptoms. \par \par \par \par Given Age risk factors and PVCs would obtain a pharmacologic stress test prior to Knee replacement. \par \par

## 2020-07-14 NOTE — HISTORY OF PRESENT ILLNESS
[FreeTextEntry1] : This is a 50-year-old gentleman with a history of PVCs and palpitations who presents for followup. He reports that his PVCs are well controlled with only rare episodes of palpitations.\par \par He is scheduled for a Knee replacement in September. \par \par DINORA WESTON  denies chest pain, chest pressure, shortness of breath, lightheadedness, dizziness, recent palpitations, syncope, presyncope, orthopnea, PND, or edema.

## 2020-08-05 ENCOUNTER — OUTPATIENT (OUTPATIENT)
Dept: OUTPATIENT SERVICES | Facility: HOSPITAL | Age: 50
LOS: 1 days | End: 2020-08-05
Payer: COMMERCIAL

## 2020-08-05 DIAGNOSIS — Z98.89 OTHER SPECIFIED POSTPROCEDURAL STATES: Chronic | ICD-10-CM

## 2020-08-05 DIAGNOSIS — Z96.652 PRESENCE OF LEFT ARTIFICIAL KNEE JOINT: Chronic | ICD-10-CM

## 2020-08-05 DIAGNOSIS — I34.0 NONRHEUMATIC MITRAL (VALVE) INSUFFICIENCY: ICD-10-CM

## 2020-08-05 DIAGNOSIS — I49.3 VENTRICULAR PREMATURE DEPOLARIZATION: ICD-10-CM

## 2020-08-05 PROCEDURE — 93017 CV STRESS TEST TRACING ONLY: CPT

## 2020-08-06 DIAGNOSIS — I34.0 NONRHEUMATIC MITRAL (VALVE) INSUFFICIENCY: ICD-10-CM

## 2020-08-06 DIAGNOSIS — I49.3 VENTRICULAR PREMATURE DEPOLARIZATION: ICD-10-CM

## 2020-08-07 RX ORDER — PINDOLOL 5 MG/1
5 TABLET ORAL TWICE DAILY
Refills: 0 | Status: COMPLETED | COMMUNITY
Start: 2020-07-07 | End: 2020-08-07

## 2020-08-07 RX ORDER — ACEBUTOLOL HYDROCHLORIDE 200 MG/1
200 CAPSULE ORAL
Qty: 90 | Refills: 0 | Status: COMPLETED | COMMUNITY
Start: 2020-06-08 | End: 2020-08-07

## 2020-08-11 ENCOUNTER — OUTPATIENT (OUTPATIENT)
Dept: OUTPATIENT SERVICES | Facility: HOSPITAL | Age: 50
LOS: 1 days | End: 2020-08-11
Payer: COMMERCIAL

## 2020-08-11 ENCOUNTER — RESULT REVIEW (OUTPATIENT)
Age: 50
End: 2020-08-11

## 2020-08-11 DIAGNOSIS — Z98.89 OTHER SPECIFIED POSTPROCEDURAL STATES: Chronic | ICD-10-CM

## 2020-08-11 DIAGNOSIS — I49.3 VENTRICULAR PREMATURE DEPOLARIZATION: ICD-10-CM

## 2020-08-11 DIAGNOSIS — Z96.652 PRESENCE OF LEFT ARTIFICIAL KNEE JOINT: Chronic | ICD-10-CM

## 2020-08-11 DIAGNOSIS — I34.0 NONRHEUMATIC MITRAL (VALVE) INSUFFICIENCY: ICD-10-CM

## 2020-08-11 PROCEDURE — 78452 HT MUSCLE IMAGE SPECT MULT: CPT

## 2020-08-11 PROCEDURE — A9500: CPT

## 2020-08-11 PROCEDURE — 93016 CV STRESS TEST SUPVJ ONLY: CPT

## 2020-08-11 PROCEDURE — 78452 HT MUSCLE IMAGE SPECT MULT: CPT | Mod: 26

## 2020-08-11 PROCEDURE — 93018 CV STRESS TEST I&R ONLY: CPT

## 2020-08-11 RX ORDER — REGADENOSON 0.08 MG/ML
0.4 INJECTION, SOLUTION INTRAVENOUS ONCE
Refills: 0 | Status: COMPLETED | OUTPATIENT
Start: 2020-08-11 | End: 2020-08-11

## 2020-08-11 RX ADMIN — REGADENOSON 0.4 MILLIGRAM(S): 0.08 INJECTION, SOLUTION INTRAVENOUS at 09:50

## 2020-08-12 DIAGNOSIS — I49.3 VENTRICULAR PREMATURE DEPOLARIZATION: ICD-10-CM

## 2020-08-12 DIAGNOSIS — I34.0 NONRHEUMATIC MITRAL (VALVE) INSUFFICIENCY: ICD-10-CM

## 2020-08-18 ENCOUNTER — NON-APPOINTMENT (OUTPATIENT)
Age: 50
End: 2020-08-18

## 2020-08-20 ENCOUNTER — APPOINTMENT (OUTPATIENT)
Dept: ELECTROPHYSIOLOGY | Facility: CLINIC | Age: 50
End: 2020-08-20

## 2020-08-31 RX ORDER — MUPIROCIN 20 MG/G
1 OINTMENT TOPICAL
Qty: 1 | Refills: 0
Start: 2020-08-31 | End: 2020-09-04

## 2020-09-01 VITALS
DIASTOLIC BLOOD PRESSURE: 86 MMHG | SYSTOLIC BLOOD PRESSURE: 150 MMHG | HEART RATE: 62 BPM | OXYGEN SATURATION: 99 % | HEIGHT: 70 IN | WEIGHT: 220.02 LBS | TEMPERATURE: 98 F | RESPIRATION RATE: 16 BRPM

## 2020-09-01 NOTE — H&P PST ADULT - NSICDXPROBLEM_GEN_ALL_CORE_FT
PROBLEM DIAGNOSES  Problem: Osteoarthritis of right knee  Assessment and Plan: Right total knee replacement is scheduled for 9/8/2020 with physical exam on admission.  medical and cardiac clearances are pending.  COVID19 nasal swabbing is scheduled for 9/6/2020; isolation reviewed.  Pre op instructions including medications, mupirocin ointment, chlorhexadine wash, gatorade drink were extensively reviewed.  Contact info given; best wishes offered.

## 2020-09-01 NOTE — H&P PST ADULT - ASSESSMENT
51 yo male is scheduled for right total knee replacement on 9/8/2020 with Dr Villalpando at [facility].

## 2020-09-01 NOTE — H&P PST ADULT - MUSCULOSKELETAL
details… detailed exam decreased ROM due to pain/joint swelling/no joint warmth/decreased ROM/diminished strength/right knee/no joint erythema

## 2020-09-01 NOTE — H&P PST ADULT - NSICDXPASTSURGICALHX_GEN_ALL_CORE_FT
PAST SURGICAL HISTORY:  History of laparoscopy exploratory, 2010    S/P ablation of ventricular arrhythmia 2008    S/P hemorrhoidectomy 2014    S/P knee surgery bilateral 1984, 1991, 2007, 2008, 2013    S/P LASIK surgery 2013    S/P shoulder surgery right 1995, left 1999    S/P tonsillectomy and adenoidectomy 1973    Status post total left knee replacement 2014

## 2020-09-01 NOTE — H&P PST ADULT - HISTORY OF PRESENT ILLNESS
49 yo male reports right knee pain for many years for which he has tried HA injections without relief.  Pain worst with walking rating 9/10.  He is scheduled for right total knee replacement on 9/8/2020 with Dr Villalpando at [facility].

## 2020-09-01 NOTE — H&P PST ADULT - NSICDXPASTMEDICALHX_GEN_ALL_CORE_FT
PAST MEDICAL HISTORY:  BPH with elevated PSA     Class 1 obesity with body mass index (BMI) of 31.0 to 31.9 in adult     GERD (gastroesophageal reflux disease)     History of IBS     History of palpitations     Lumbar disc herniation L4-5, asymptomatic    NSVT (nonsustained ventricular tachycardia)     Osteoarthritis of right knee     Palpitations     Seasonal allergies     Sleep apnea

## 2020-09-01 NOTE — H&P PST ADULT - NEGATIVE ENMT SYMPTOMS
no dry mouth/no dysphagia/no gum bleeding/no nasal congestion/no throat pain/no vertigo/no tinnitus/no nasal obstruction/no recurrent cold sores/no abnormal taste sensation/no hearing difficulty/no ear pain/no sinus symptoms/no nasal discharge/no post-nasal discharge/no nose bleeds

## 2020-09-02 ENCOUNTER — APPOINTMENT (OUTPATIENT)
Dept: ORTHOPEDIC SURGERY | Facility: CLINIC | Age: 50
End: 2020-09-02
Payer: OTHER MISCELLANEOUS

## 2020-09-02 PROCEDURE — 73562 X-RAY EXAM OF KNEE 3: CPT | Mod: RT

## 2020-09-02 PROCEDURE — 99214 OFFICE O/P EST MOD 30 MIN: CPT

## 2020-09-04 RX ORDER — SODIUM CHLORIDE 9 MG/ML
1000 INJECTION, SOLUTION INTRAVENOUS
Refills: 0 | Status: DISCONTINUED | OUTPATIENT
Start: 2020-09-08 | End: 2020-09-09

## 2020-09-04 RX ORDER — CHLORHEXIDINE GLUCONATE 213 G/1000ML
1 SOLUTION TOPICAL DAILY
Refills: 0 | Status: DISCONTINUED | OUTPATIENT
Start: 2020-09-08 | End: 2020-09-09

## 2020-09-06 ENCOUNTER — OUTPATIENT (OUTPATIENT)
Dept: OUTPATIENT SERVICES | Facility: HOSPITAL | Age: 50
LOS: 1 days | End: 2020-09-06
Payer: COMMERCIAL

## 2020-09-06 DIAGNOSIS — Z98.89 OTHER SPECIFIED POSTPROCEDURAL STATES: Chronic | ICD-10-CM

## 2020-09-06 DIAGNOSIS — Z11.59 ENCOUNTER FOR SCREENING FOR OTHER VIRAL DISEASES: ICD-10-CM

## 2020-09-06 DIAGNOSIS — M17.11 UNILATERAL PRIMARY OSTEOARTHRITIS, RIGHT KNEE: ICD-10-CM

## 2020-09-06 DIAGNOSIS — Z01.818 ENCOUNTER FOR OTHER PREPROCEDURAL EXAMINATION: ICD-10-CM

## 2020-09-06 DIAGNOSIS — Z96.652 PRESENCE OF LEFT ARTIFICIAL KNEE JOINT: Chronic | ICD-10-CM

## 2020-09-06 LAB — SARS-COV-2 RNA SPEC QL NAA+PROBE: SIGNIFICANT CHANGE UP

## 2020-09-06 PROCEDURE — U0003: CPT

## 2020-09-06 PROCEDURE — G0463: CPT

## 2020-09-07 ENCOUNTER — TRANSCRIPTION ENCOUNTER (OUTPATIENT)
Age: 50
End: 2020-09-07

## 2020-09-07 ENCOUNTER — FORM ENCOUNTER (OUTPATIENT)
Age: 50
End: 2020-09-07

## 2020-09-08 ENCOUNTER — INPATIENT (INPATIENT)
Facility: HOSPITAL | Age: 50
LOS: 0 days | Discharge: HOME | DRG: 470 | End: 2020-09-09
Attending: SPECIALIST | Admitting: SPECIALIST
Payer: COMMERCIAL

## 2020-09-08 ENCOUNTER — APPOINTMENT (OUTPATIENT)
Dept: ORTHOPEDIC SURGERY | Facility: HOSPITAL | Age: 50
End: 2020-09-08

## 2020-09-08 ENCOUNTER — RESULT REVIEW (OUTPATIENT)
Age: 50
End: 2020-09-08

## 2020-09-08 ENCOUNTER — TRANSCRIPTION ENCOUNTER (OUTPATIENT)
Age: 50
End: 2020-09-08

## 2020-09-08 VITALS
HEIGHT: 70 IN | TEMPERATURE: 99 F | RESPIRATION RATE: 16 BRPM | SYSTOLIC BLOOD PRESSURE: 150 MMHG | DIASTOLIC BLOOD PRESSURE: 86 MMHG | WEIGHT: 218.26 LBS | HEART RATE: 62 BPM | OXYGEN SATURATION: 99 %

## 2020-09-08 DIAGNOSIS — Z29.9 ENCOUNTER FOR PROPHYLACTIC MEASURES, UNSPECIFIED: ICD-10-CM

## 2020-09-08 DIAGNOSIS — Z01.818 ENCOUNTER FOR OTHER PREPROCEDURAL EXAMINATION: ICD-10-CM

## 2020-09-08 DIAGNOSIS — Z11.59 ENCOUNTER FOR SCREENING FOR OTHER VIRAL DISEASES: ICD-10-CM

## 2020-09-08 DIAGNOSIS — Z98.89 OTHER SPECIFIED POSTPROCEDURAL STATES: Chronic | ICD-10-CM

## 2020-09-08 DIAGNOSIS — M17.11 UNILATERAL PRIMARY OSTEOARTHRITIS, RIGHT KNEE: ICD-10-CM

## 2020-09-08 DIAGNOSIS — N40.1 BENIGN PROSTATIC HYPERPLASIA WITH LOWER URINARY TRACT SYMPTOMS: ICD-10-CM

## 2020-09-08 DIAGNOSIS — R73.9 HYPERGLYCEMIA, UNSPECIFIED: ICD-10-CM

## 2020-09-08 DIAGNOSIS — J30.2 OTHER SEASONAL ALLERGIC RHINITIS: ICD-10-CM

## 2020-09-08 DIAGNOSIS — G47.33 OBSTRUCTIVE SLEEP APNEA (ADULT) (PEDIATRIC): ICD-10-CM

## 2020-09-08 DIAGNOSIS — I49.9 CARDIAC ARRHYTHMIA, UNSPECIFIED: ICD-10-CM

## 2020-09-08 DIAGNOSIS — Z96.652 PRESENCE OF LEFT ARTIFICIAL KNEE JOINT: Chronic | ICD-10-CM

## 2020-09-08 LAB
ANION GAP SERPL CALC-SCNC: 8 MMOL/L — SIGNIFICANT CHANGE UP (ref 5–17)
BLD GP AB SCN SERPL QL: SIGNIFICANT CHANGE UP
BUN SERPL-MCNC: 11 MG/DL — SIGNIFICANT CHANGE UP (ref 7–23)
CALCIUM SERPL-MCNC: 9.1 MG/DL — SIGNIFICANT CHANGE UP (ref 8.4–10.5)
CHLORIDE SERPL-SCNC: 102 MMOL/L — SIGNIFICANT CHANGE UP (ref 96–108)
CO2 SERPL-SCNC: 27 MMOL/L — SIGNIFICANT CHANGE UP (ref 22–31)
CREAT SERPL-MCNC: 1.17 MG/DL — SIGNIFICANT CHANGE UP (ref 0.5–1.3)
GLUCOSE SERPL-MCNC: 203 MG/DL — HIGH (ref 70–99)
HCT VFR BLD CALC: 46.1 % — SIGNIFICANT CHANGE UP (ref 39–50)
HGB BLD-MCNC: 15.9 G/DL — SIGNIFICANT CHANGE UP (ref 13–17)
POTASSIUM SERPL-MCNC: 3.7 MMOL/L — SIGNIFICANT CHANGE UP (ref 3.5–5.3)
POTASSIUM SERPL-SCNC: 3.7 MMOL/L — SIGNIFICANT CHANGE UP (ref 3.5–5.3)
SODIUM SERPL-SCNC: 137 MMOL/L — SIGNIFICANT CHANGE UP (ref 135–145)

## 2020-09-08 PROCEDURE — 99223 1ST HOSP IP/OBS HIGH 75: CPT

## 2020-09-08 PROCEDURE — 88305 TISSUE EXAM BY PATHOLOGIST: CPT | Mod: 26

## 2020-09-08 PROCEDURE — 27447 TOTAL KNEE ARTHROPLASTY: CPT | Mod: RT

## 2020-09-08 PROCEDURE — 88311 DECALCIFY TISSUE: CPT | Mod: 26

## 2020-09-08 PROCEDURE — 73562 X-RAY EXAM OF KNEE 3: CPT | Mod: 26,RT

## 2020-09-08 RX ORDER — SENNA PLUS 8.6 MG/1
2 TABLET ORAL AT BEDTIME
Refills: 0 | Status: DISCONTINUED | OUTPATIENT
Start: 2020-09-08 | End: 2020-09-09

## 2020-09-08 RX ORDER — ACETAMINOPHEN 500 MG
1000 TABLET ORAL ONCE
Refills: 0 | Status: COMPLETED | OUTPATIENT
Start: 2020-09-08 | End: 2020-09-08

## 2020-09-08 RX ORDER — PANTOPRAZOLE SODIUM 20 MG/1
40 TABLET, DELAYED RELEASE ORAL
Refills: 0 | Status: DISCONTINUED | OUTPATIENT
Start: 2020-09-08 | End: 2020-09-09

## 2020-09-08 RX ORDER — ACETAMINOPHEN 500 MG
1000 TABLET ORAL EVERY 6 HOURS
Refills: 0 | Status: COMPLETED | OUTPATIENT
Start: 2020-09-08 | End: 2020-09-08

## 2020-09-08 RX ORDER — APREPITANT 80 MG/1
40 CAPSULE ORAL ONCE
Refills: 0 | Status: COMPLETED | OUTPATIENT
Start: 2020-09-08 | End: 2020-09-08

## 2020-09-08 RX ORDER — ATENOLOL 25 MG/1
12.5 TABLET ORAL DAILY
Refills: 0 | Status: DISCONTINUED | OUTPATIENT
Start: 2020-09-08 | End: 2020-09-09

## 2020-09-08 RX ORDER — TAMSULOSIN HYDROCHLORIDE 0.4 MG/1
0.4 CAPSULE ORAL AT BEDTIME
Refills: 0 | Status: DISCONTINUED | OUTPATIENT
Start: 2020-09-08 | End: 2020-09-09

## 2020-09-08 RX ORDER — ACETAMINOPHEN 500 MG
1000 TABLET ORAL EVERY 8 HOURS
Refills: 0 | Status: DISCONTINUED | OUTPATIENT
Start: 2020-09-09 | End: 2020-09-09

## 2020-09-08 RX ORDER — OXYCODONE HYDROCHLORIDE 5 MG/1
5 TABLET ORAL
Refills: 0 | Status: DISCONTINUED | OUTPATIENT
Start: 2020-09-08 | End: 2020-09-09

## 2020-09-08 RX ORDER — SODIUM CHLORIDE 9 MG/ML
500 INJECTION INTRAMUSCULAR; INTRAVENOUS; SUBCUTANEOUS ONCE
Refills: 0 | Status: COMPLETED | OUTPATIENT
Start: 2020-09-08 | End: 2020-09-08

## 2020-09-08 RX ORDER — ASPIRIN/CALCIUM CARB/MAGNESIUM 324 MG
81 TABLET ORAL EVERY 12 HOURS
Refills: 0 | Status: DISCONTINUED | OUTPATIENT
Start: 2020-09-09 | End: 2020-09-09

## 2020-09-08 RX ORDER — TRANEXAMIC ACID 100 MG/ML
1000 INJECTION, SOLUTION INTRAVENOUS ONCE
Refills: 0 | Status: COMPLETED | OUTPATIENT
Start: 2020-09-08 | End: 2020-09-08

## 2020-09-08 RX ORDER — OXYCODONE HYDROCHLORIDE 5 MG/1
10 TABLET ORAL
Refills: 0 | Status: DISCONTINUED | OUTPATIENT
Start: 2020-09-08 | End: 2020-09-09

## 2020-09-08 RX ORDER — ACETAMINOPHEN 500 MG
1000 TABLET ORAL EVERY 6 HOURS
Refills: 0 | Status: COMPLETED | OUTPATIENT
Start: 2020-09-09 | End: 2020-09-09

## 2020-09-08 RX ORDER — ONDANSETRON 8 MG/1
4 TABLET, FILM COATED ORAL EVERY 6 HOURS
Refills: 0 | Status: DISCONTINUED | OUTPATIENT
Start: 2020-09-08 | End: 2020-09-09

## 2020-09-08 RX ORDER — SODIUM CHLORIDE 9 MG/ML
1000 INJECTION, SOLUTION INTRAVENOUS
Refills: 0 | Status: DISCONTINUED | OUTPATIENT
Start: 2020-09-08 | End: 2020-09-08

## 2020-09-08 RX ORDER — POLYETHYLENE GLYCOL 3350 17 G/17G
17 POWDER, FOR SOLUTION ORAL DAILY
Refills: 0 | Status: DISCONTINUED | OUTPATIENT
Start: 2020-09-08 | End: 2020-09-09

## 2020-09-08 RX ORDER — MAGNESIUM HYDROXIDE 400 MG/1
30 TABLET, CHEWABLE ORAL DAILY
Refills: 0 | Status: DISCONTINUED | OUTPATIENT
Start: 2020-09-08 | End: 2020-09-09

## 2020-09-08 RX ORDER — CEFAZOLIN SODIUM 1 G
2000 VIAL (EA) INJECTION EVERY 8 HOURS
Refills: 0 | Status: COMPLETED | OUTPATIENT
Start: 2020-09-08 | End: 2020-09-09

## 2020-09-08 RX ORDER — CEFAZOLIN SODIUM 1 G
2000 VIAL (EA) INJECTION ONCE
Refills: 0 | Status: COMPLETED | OUTPATIENT
Start: 2020-09-08 | End: 2020-09-08

## 2020-09-08 RX ORDER — ONDANSETRON 8 MG/1
4 TABLET, FILM COATED ORAL ONCE
Refills: 0 | Status: DISCONTINUED | OUTPATIENT
Start: 2020-09-08 | End: 2020-09-08

## 2020-09-08 RX ORDER — HYDROMORPHONE HYDROCHLORIDE 2 MG/ML
0.5 INJECTION INTRAMUSCULAR; INTRAVENOUS; SUBCUTANEOUS
Refills: 0 | Status: DISCONTINUED | OUTPATIENT
Start: 2020-09-08 | End: 2020-09-09

## 2020-09-08 RX ORDER — SODIUM CHLORIDE 9 MG/ML
1000 INJECTION, SOLUTION INTRAVENOUS
Refills: 0 | Status: DISCONTINUED | OUTPATIENT
Start: 2020-09-08 | End: 2020-09-09

## 2020-09-08 RX ORDER — HYDROMORPHONE HYDROCHLORIDE 2 MG/ML
0.5 INJECTION INTRAMUSCULAR; INTRAVENOUS; SUBCUTANEOUS
Refills: 0 | Status: DISCONTINUED | OUTPATIENT
Start: 2020-09-08 | End: 2020-09-08

## 2020-09-08 RX ORDER — CELECOXIB 200 MG/1
200 CAPSULE ORAL EVERY 12 HOURS
Refills: 0 | Status: DISCONTINUED | OUTPATIENT
Start: 2020-09-09 | End: 2020-09-09

## 2020-09-08 RX ADMIN — Medication 400 MILLIGRAM(S): at 21:24

## 2020-09-08 RX ADMIN — SODIUM CHLORIDE 100 MILLILITER(S): 9 INJECTION, SOLUTION INTRAVENOUS at 21:24

## 2020-09-08 RX ADMIN — SODIUM CHLORIDE 500 MILLILITER(S): 9 INJECTION INTRAMUSCULAR; INTRAVENOUS; SUBCUTANEOUS at 18:24

## 2020-09-08 RX ADMIN — SENNA PLUS 2 TABLET(S): 8.6 TABLET ORAL at 21:26

## 2020-09-08 RX ADMIN — OXYCODONE HYDROCHLORIDE 10 MILLIGRAM(S): 5 TABLET ORAL at 23:45

## 2020-09-08 RX ADMIN — SODIUM CHLORIDE 100 MILLILITER(S): 9 INJECTION, SOLUTION INTRAVENOUS at 18:24

## 2020-09-08 RX ADMIN — TAMSULOSIN HYDROCHLORIDE 0.4 MILLIGRAM(S): 0.4 CAPSULE ORAL at 21:22

## 2020-09-08 RX ADMIN — APREPITANT 40 MILLIGRAM(S): 80 CAPSULE ORAL at 13:15

## 2020-09-08 RX ADMIN — CHLORHEXIDINE GLUCONATE 1 APPLICATION(S): 213 SOLUTION TOPICAL at 13:17

## 2020-09-08 RX ADMIN — ATENOLOL 12.5 MILLIGRAM(S): 25 TABLET ORAL at 21:23

## 2020-09-08 RX ADMIN — Medication 1000 MILLIGRAM(S): at 21:55

## 2020-09-08 RX ADMIN — Medication 100 MILLIGRAM(S): at 23:45

## 2020-09-08 NOTE — CONSULT NOTE ADULT - PROBLEM SELECTOR RECOMMENDATION 3
Chronic NSVT, a trial for ablation was not complete at EP lab, patient is aware of the arrhythmia, on Holter f/u Q 6 months with his cardiologist, continue Atenolol with hold parameters, and telemonitoring. had straight cath at PACU with 750 ml of residual urine, now with full bladder again and failed to void, straight cathed, obtained another 800 ml of clear urine, continue on Flomax (formulary for Silodosin), monitor for voiding.

## 2020-09-08 NOTE — CONSULT NOTE ADULT - PROBLEM SELECTOR RECOMMENDATION 6
Patient is at a significant risk for VTE, was started on B/L intermittent pneumatic compression device for DVT prophylaxis, pharmacological DVT prophylaxis as per orthopedic surgery team. currently asymptomatic, continue Flonase spray.

## 2020-09-08 NOTE — CONSULT NOTE ADULT - PROBLEM SELECTOR RECOMMENDATION 7
Patient is at a significant risk for VTE, was started on B/L intermittent pneumatic compression device for DVT prophylaxis, pharmacological DVT prophylaxis as per orthopedic surgery team.

## 2020-09-08 NOTE — PHYSICAL THERAPY INITIAL EVALUATION ADULT - ADDITIONAL COMMENTS
Pt lives in private home with 3 RAMA no HR (does have columns on either side) and 1 flight +HR to bedroom. Pt rec'd RW and Commode in preparation for surgery.

## 2020-09-08 NOTE — DISCHARGE NOTE PROVIDER - NSDCCPCAREPLAN_GEN_ALL_CORE_FT
PRINCIPAL DISCHARGE DIAGNOSIS  Diagnosis: Primary osteoarthritis of right knee  Assessment and Plan of Treatment: For your total knee replacement:  Physical Therapy/Occupational Therapy for: ambulation, transfers, stairs, ADL's (activities of daily living), range of motion exercises, and isometrics  -Activity  • Weight Bearing as tolerated with rolling walker.  • Ice 20 minutes several times daily with at least a 20 minute break in between icing sessions  • Take short, frequent walks increasing the distance that you walk each day as tolerated.  • Change your position every hour to decrease pain and stiffness.  • Continue the exercises taught to you by your physical therapist.  • No driving until cleared by the doctor.  • No tub baths, hot tubs, or swimming pools until instructed by your doctor.  • Do not squat down on the floor.  • Do not kneel or twist your knee.  • Range of Motion Goals: Flexion= 120 degrees, Extension = 0 degrees  Daily dressing changes if incision is not completely dry.  If inicision is dry, it may be left open to air.  Keep incision clean. DO NOT APPLY ANYTHING to incision site (salves/ointments/creams).  May shower post-op if no drainage from incision.  Do not scrub incision site. Pat dry after shower.  Suture removal 2 weeks after surgery at Surgeon's office.

## 2020-09-08 NOTE — CONSULT NOTE ADULT - PROBLEM SELECTOR RECOMMENDATION 5
currently asymptomatic, continue Flonase spray. not on C-PAP at home, "only using a pillow", on continuous telemonitoring.

## 2020-09-08 NOTE — CONSULT NOTE ADULT - SUBJECTIVE AND OBJECTIVE BOX
This is a 51 y/o M with PMH of NSVT s/p EPS without ablation, ADOLFO, BPH, IBS, GERD, Seasonal allergies, and OA, Obesity who presented for right total knee arthroplasty. Patient was experiencing moderate right knee pain that was getting worse with weight bearing, was seen by ortho, and was scheduled for the surgery for today. Routine post-op medical evaluation was requested. Patient reports occasional palpitations "as usual" but denies any chest pain, SOB, dizziness, headache, paranesthesias, or focal muscle weakness.          ALLERGIES:     Ketamine, adhesive tape.          PAST MEDICAL & SURGICAL HISTORY:    Class 1 obesity with body mass index (BMI) of 31.0 to 31.9 in adult  Seasonal allergies  Sleep apnea  GERD (gastroesophageal reflux disease)  History of IBS  BPH with elevated PSA  History of palpitations  Osteoarthritis of right knee  Lumbar disc herniation: L4-5, asymptomatic  Palpitations  NSVT (nonsustained ventricular tachycardia)  History of laparoscopy: exploratory, 2010  Status post total left knee replacement: 2014  S/P hemorrhoidectomy: 2014  S/P LASIK surgery: 2013  S/P ablation of ventricular arrhythmia: 2008  S/P shoulder surgery: right 1995, left 1999  S/P knee surgery: bilateral 1984, 1991, 2007, 2008, 2013  S/P tonsillectomy and adenoidectomy: 1973          SOCIAL HISTORY:     , lives with wife & kids, , non smoker, social ETOH, no drug abuse.             FAMILY HISTORY:    Family history of alcoholism (Mother, Grandparent)  Family history of cervical cancer (Mother)            MEDICATIONS  (STANDING):    ATENolol  Tablet 12.5 milliGRAM(s) Oral daily  ceFAZolin   IVPB 2000 milliGRAM(s) IV Intermittent every 8 hours  chlorhexidine 2% Cloths 1 Application(s) Topical daily  lactated ringers. 1000 milliLiter(s) (80 mL/Hr) IV Continuous <Continuous>  lactated ringers. 1000 milliLiter(s) (100 mL/Hr) IV Continuous <Continuous>  pantoprazole    Tablet 40 milliGRAM(s) Oral before breakfast  senna 2 Tablet(s) Oral at bedtime  tamsulosin 0.4 milliGRAM(s) Oral at bedtime              MEDICATIONS  (PRN):    aluminum hydroxide/magnesium hydroxide/simethicone Suspension 30 milliLiter(s) Oral four times a day PRN Indigestion  HYDROmorphone  Injectable 0.5 milliGRAM(s) IV Push every 3 hours PRN breakthrough  magnesium hydroxide Suspension 30 milliLiter(s) Oral daily PRN Constipation  ondansetron Injectable 4 milliGRAM(s) IV Push every 6 hours PRN Nausea and/or Vomiting  oxyCODONE    IR 5 milliGRAM(s) Oral every 3 hours PRN Moderate Pain (4 - 6)  oxyCODONE    IR 10 milliGRAM(s) Oral every 3 hours PRN Severe Pain (7 - 10)  polyethylene glycol 3350 17 Gram(s) Oral daily PRN Constipation      Home Medications:    atenolol: 12.5 milligram(s) orally once a day (01 Sep 2020 14:36)  Flonase 50 mcg/inh nasal spray: 1 spray(s) in each nostril once a day (01 Sep 2020 14:36)  silodosin 8 mg oral capsule: cap(s) orally once a day (01 Sep 2020 14:36)      REVIEW OF SYSTEMS:    CONSTITUTIONAL: No fever or chills, no weight loss.  EYES: No eye pain, visual disturbances, or discharge.  ENMT:  No difficulty hearing, tinnitus, vertigo; No sinus or throat pain.  NECK: No pain or stiffness.	  RESPIRATORY: No cough, wheezing, or hemoptysis; No shortness of breath.  CARDIOVASCULAR: No chest pain, dizziness, or leg swelling, (+) occasional palpitations.  GASTROINTESTINAL: No abdominal pain, no nausea, vomiting, or hematemesis; No diarrhea or Change in bowel habits. No melena or hematochezia.  GENITOURINARY: No dysuria, frequency, hematuria, or incontinence.  NEUROLOGICAL: No headaches, focal muscle weakness, numbness, or tremors.  SKIN: No itching, burning or rashes.  MUSCULOSKELETAL: No joint swelling or pain.  PSYCHIATRIC: No depression, anxiety, or agitation.  HEME/LYMPH: No easy bruising, bleeding gums, or nose bleed.  ALLERGY AND IMMUNOLOGIC: No hives or eczema.            Vital signs:  Vital Signs Last 24 Hrs  T(C): 36.7 (08 Sep 2020 17:10), Max: 37.1 (08 Sep 2020 13:03)  T(F): 98 (08 Sep 2020 17:10), Max: 98.7 (08 Sep 2020 13:03)  HR: 90 (08 Sep 2020 20:05) (62 - 90)  BP: 132/84 (08 Sep 2020 20:05) (115/62 - 153/92)  BP(mean): --  RR: 14 (08 Sep 2020 20:05) (6 - 20)  SpO2: 97% (08 Sep 2020 20:05) (93% - 100%)          PHYSICAL EXAM:  		  GENERAL: NAD, well-groomed, well-developed.  HEAD:  Atraumatic, Norm cephalic.  EYES: PERRLA, conjunctiva clear.  ENMT: no nasal discharge, no narinder-pharyngeal erythema or exudates, MMM.   NECK: Supple, No JVD.  NERVOUS SYSTEM:  Alert & oriented X3, neurologically intact grossly.  CHEST/LUNG: Good air entry B/L, no rales, rhonchi, or wheezing.  HEART: Normal S1 & S2, no murmurs, or extra sounds.  ABDOMEN: Soft, non-tender, non-distended; bowel sounds present, no organomegaly, (+) palpable full bladder.  EXTREMITIES:  No clubbing, cyanosis, or edema.  VASCULAR: 2+ radial, DPA / PTA pulses B/L.  SKIN: No rashes or lesions.  PSYCH: normal affect & behavior.          LABs:                  15.9   x     )-----------( x        ( 08 Sep 2020 20:40 )             46.1            09-08    137  |  102  |  11  ----------------------------<  203<H>  3.7   |  27  |  1.17    Ca    9.1      08 Sep 2020 20:30            COVID-19 PCR . (09.06.20 @ 11:10)    COVID-19 PCR: NotDetec: Testing is performed using polymerase chain reaction (PCR) or  transcription mediated amplification (TMA). This COVID-19 (SARS-CoV-2)  nucleic acid amplification test was validated by [employer] and is  in use under the FDA Emergency Use Authorization (EUA) for clinical labs  CLIA-certified to perform high complexity testing. Test results should be  correlated with clinical presentation, patient history, and epidemiology. This is a 51 y/o M with PMH of NSVT s/p EPS without ablation, ADOLFO, BPH, IBS, GERD, Seasonal allergies, and OA, Obesity who presented for right total knee arthroplasty. Patient was experiencing moderate right knee pain that was getting worse with weight bearing, was seen by ortho, and was scheduled for the surgery for today. Routine post-op medical evaluation was requested. Patient reports occasional palpitations "as usual" but denies any chest pain, SOB, dizziness, headache, paranesthesias, or focal muscle weakness.          ALLERGIES:     Ketamine, adhesive tape.          PAST MEDICAL & SURGICAL HISTORY:    Class 1 obesity with body mass index (BMI) of 31.0 to 31.9 in adult  Seasonal allergies  Sleep apnea  GERD (gastroesophageal reflux disease)  History of IBS  BPH with elevated PSA  History of palpitations  Osteoarthritis of right knee  Lumbar disc herniation: L4-5, asymptomatic  Palpitations  NSVT (nonsustained ventricular tachycardia)  History of laparoscopy: exploratory, 2010  Status post total left knee replacement: 2014  S/P hemorrhoidectomy: 2014  S/P LASIK surgery: 2013  S/P ablation of ventricular arrhythmia: 2008  S/P shoulder surgery: right 1995, left 1999  S/P knee surgery: bilateral 1984, 1991, 2007, 2008, 2013  S/P tonsillectomy and adenoidectomy: 1973          SOCIAL HISTORY:     , lives with wife & kids, , non smoker, social ETOH, no drug abuse.             FAMILY HISTORY:    Family history of alcoholism (Mother, Grandparent)  Family history of cervical cancer (Mother)            MEDICATIONS  (STANDING):    ATENolol  Tablet 12.5 milliGRAM(s) Oral daily  ceFAZolin   IVPB 2000 milliGRAM(s) IV Intermittent every 8 hours  chlorhexidine 2% Cloths 1 Application(s) Topical daily  lactated ringers. 1000 milliLiter(s) (80 mL/Hr) IV Continuous <Continuous>  lactated ringers. 1000 milliLiter(s) (100 mL/Hr) IV Continuous <Continuous>  pantoprazole    Tablet 40 milliGRAM(s) Oral before breakfast  senna 2 Tablet(s) Oral at bedtime  tamsulosin 0.4 milliGRAM(s) Oral at bedtime              MEDICATIONS  (PRN):    aluminum hydroxide/magnesium hydroxide/simethicone Suspension 30 milliLiter(s) Oral four times a day PRN Indigestion  HYDROmorphone  Injectable 0.5 milliGRAM(s) IV Push every 3 hours PRN breakthrough  magnesium hydroxide Suspension 30 milliLiter(s) Oral daily PRN Constipation  ondansetron Injectable 4 milliGRAM(s) IV Push every 6 hours PRN Nausea and/or Vomiting  oxyCODONE    IR 5 milliGRAM(s) Oral every 3 hours PRN Moderate Pain (4 - 6)  oxyCODONE    IR 10 milliGRAM(s) Oral every 3 hours PRN Severe Pain (7 - 10)  polyethylene glycol 3350 17 Gram(s) Oral daily PRN Constipation      Home Medications:    atenolol: 12.5 milligram(s) orally once a day (01 Sep 2020 14:36)  Flonase 50 mcg/inh nasal spray: 1 spray(s) in each nostril once a day (01 Sep 2020 14:36)  silodosin 8 mg oral capsule: cap(s) orally once a day (01 Sep 2020 14:36)      REVIEW OF SYSTEMS:    CONSTITUTIONAL: No fever or chills, no weight loss.  EYES: No eye pain, visual disturbances, or discharge.  ENMT:  No difficulty hearing, tinnitus, vertigo; No sinus or throat pain.  NECK: No pain or stiffness.	  RESPIRATORY: No cough, wheezing, or hemoptysis; No shortness of breath.  CARDIOVASCULAR: No chest pain, dizziness, or leg swelling, (+) occasional palpitations.  GASTROINTESTINAL: No abdominal pain, no nausea, vomiting, or hematemesis; No diarrhea or Change in bowel habits. No melena or hematochezia.  GENITOURINARY: No dysuria, frequency, hematuria, or incontinence.  NEUROLOGICAL: No headaches, focal muscle weakness, numbness, or tremors.  SKIN: No itching, burning or rashes.  MUSCULOSKELETAL: No joint swelling or pain.  PSYCHIATRIC: No depression, anxiety, or agitation.  HEME/LYMPH: No easy bruising, bleeding gums, or nose bleed.  ALLERGY AND IMMUNOLOGIC: No hives or eczema.            Vital signs:  Vital Signs Last 24 Hrs  T(C): 36.7 (08 Sep 2020 17:10), Max: 37.1 (08 Sep 2020 13:03)  T(F): 98 (08 Sep 2020 17:10), Max: 98.7 (08 Sep 2020 13:03)  HR: 90 (08 Sep 2020 20:05) (62 - 90)  BP: 132/84 (08 Sep 2020 20:05) (115/62 - 153/92)  BP(mean): --  RR: 14 (08 Sep 2020 20:05) (6 - 20)  SpO2: 97% (08 Sep 2020 20:05) (93% - 100%)          PHYSICAL EXAM:  		  GENERAL: NAD, well-groomed, well-developed.  HEAD:  Atraumatic, Norm cephalic.  EYES: PERRLA, conjunctiva clear.  ENMT: no nasal discharge, no narinder-pharyngeal erythema or exudates, MMM.   NECK: Supple, No JVD.  NERVOUS SYSTEM:  Alert & oriented X3, neurologically intact grossly.  CHEST/LUNG: Good air entry B/L, no rales, rhonchi, or wheezing.  HEART: Normal S1 & S2, no murmurs, or extra sounds.  ABDOMEN: Soft, non-tender, non-distended; bowel sounds present, no organomegaly, (+) palpable full bladder.  EXTREMITIES:  No clubbing, cyanosis, or edema.  VASCULAR: 2+ radial, DPA / PTA pulses B/L.  SKIN: No rashes or lesions.  PSYCH: normal affect & behavior.          LABs:                  15.9   x     )-----------( x        ( 08 Sep 2020 20:40 )             46.1            09-08    137  |  102  |  11  ----------------------------<  203<H>  3.7   |  27  |  1.17    Ca    9.1      08 Sep 2020 20:30            COVID-19 PCR . (09.06.20 @ 11:10)    COVID-19 PCR: NotDetec: Testing is performed using polymerase chain reaction (PCR) or  transcription mediated amplification (TMA). This COVID-19 (SARS-CoV-2)  nucleic acid amplification test was validated by [employer] and is  in use under the FDA Emergency Use Authorization (EUA) for clinical labs  CLIA-certified to perform high complexity testing. Test results should be  correlated with clinical presentation, patient history, and epidemiology.          KNEE AP LAT & OBL right:    As per my review shows s/p total knee replacement with acceptable alignment. Pending official report.        EKG:    As per my review shows SR at 85/min, with frequent uniform PVCs, normal AR & QTc intervals, normal QRS voltage, duration, and axis (+15), with normal transition, no ST-T abnormality.      - This is a 49 y/o M with PMH of NSVT s/p EPS without ablation, ADOLFO, BPH, IBS, GERD, Seasonal Allergies, OA, and Obesity who presented for right total knee arthroplasty. Patient was experiencing moderate right knee pain that was getting worse with weight bearing, was seen by ortho, and was scheduled for the surgery for today. Routine post-op medical evaluation was requested. Patient reports occasional palpitations "as usual" but denies any chest pain, SOB, dizziness, headache, paranesthesias, or focal muscle weakness.          ALLERGIES:     Ketamine, adhesive tape.          PAST MEDICAL & SURGICAL HISTORY:    Class 1 obesity with body mass index (BMI) of 31.0 to 31.9 in adult  Seasonal allergies  Sleep apnea  GERD (gastroesophageal reflux disease)  History of IBS  BPH with elevated PSA  History of palpitations  Osteoarthritis of right knee  Lumbar disc herniation: L4-5, asymptomatic  Palpitations  NSVT (nonsustained ventricular tachycardia)  History of laparoscopy: exploratory, 2010  Status post total left knee replacement: 2014  S/P hemorrhoidectomy: 2014  S/P LASIK surgery: 2013  S/P ablation of ventricular arrhythmia: 2008  S/P shoulder surgery: right 1995, left 1999  S/P knee surgery: bilateral 1984, 1991, 2007, 2008, 2013  S/P tonsillectomy and adenoidectomy: 1973          SOCIAL HISTORY:     , lives with wife & kids, , non smoker, social ETOH, no drug abuse.             FAMILY HISTORY:    Family history of alcoholism (Mother, Grandparent)  Family history of cervical cancer (Mother)            MEDICATIONS  (STANDING):    ATENolol  Tablet 12.5 milliGRAM(s) Oral daily  ceFAZolin   IVPB 2000 milliGRAM(s) IV Intermittent every 8 hours  chlorhexidine 2% Cloths 1 Application(s) Topical daily  lactated ringers. 1000 milliLiter(s) (80 mL/Hr) IV Continuous <Continuous>  lactated ringers. 1000 milliLiter(s) (100 mL/Hr) IV Continuous <Continuous>  pantoprazole    Tablet 40 milliGRAM(s) Oral before breakfast  senna 2 Tablet(s) Oral at bedtime  tamsulosin 0.4 milliGRAM(s) Oral at bedtime              MEDICATIONS  (PRN):    aluminum hydroxide/magnesium hydroxide/simethicone Suspension 30 milliLiter(s) Oral four times a day PRN Indigestion  HYDROmorphone  Injectable 0.5 milliGRAM(s) IV Push every 3 hours PRN breakthrough  magnesium hydroxide Suspension 30 milliLiter(s) Oral daily PRN Constipation  ondansetron Injectable 4 milliGRAM(s) IV Push every 6 hours PRN Nausea and/or Vomiting  oxyCODONE    IR 5 milliGRAM(s) Oral every 3 hours PRN Moderate Pain (4 - 6)  oxyCODONE    IR 10 milliGRAM(s) Oral every 3 hours PRN Severe Pain (7 - 10)  polyethylene glycol 3350 17 Gram(s) Oral daily PRN Constipation      Home Medications:    atenolol: 12.5 milligram(s) orally once a day (01 Sep 2020 14:36)  Flonase 50 mcg/inh nasal spray: 1 spray(s) in each nostril once a day (01 Sep 2020 14:36)  silodosin 8 mg oral capsule: cap(s) orally once a day (01 Sep 2020 14:36)      REVIEW OF SYSTEMS:    CONSTITUTIONAL: No fever or chills, no weight loss.  EYES: No eye pain, visual disturbances, or discharge.  ENMT:  No difficulty hearing, tinnitus, vertigo; No sinus or throat pain.  NECK: No pain or stiffness.	  RESPIRATORY: No cough, wheezing, or hemoptysis; No shortness of breath.  CARDIOVASCULAR: No chest pain, dizziness, or leg swelling, (+) occasional palpitations.  GASTROINTESTINAL: No abdominal pain, no nausea, vomiting, or hematemesis; No diarrhea or Change in bowel habits. No melena or hematochezia.  GENITOURINARY: No dysuria, frequency, hematuria, or incontinence.  NEUROLOGICAL: No headaches, focal muscle weakness, numbness, or tremors.  SKIN: No itching, burning or rashes.  MUSCULOSKELETAL: No joint swelling or pain.  PSYCHIATRIC: No depression, anxiety, or agitation.  HEME/LYMPH: No easy bruising, bleeding gums, or nose bleed.  ALLERGY AND IMMUNOLOGIC: No hives or eczema.            Vital signs:  Vital Signs Last 24 Hrs  T(C): 36.7 (08 Sep 2020 17:10), Max: 37.1 (08 Sep 2020 13:03)  T(F): 98 (08 Sep 2020 17:10), Max: 98.7 (08 Sep 2020 13:03)  HR: 90 (08 Sep 2020 20:05) (62 - 90)  BP: 132/84 (08 Sep 2020 20:05) (115/62 - 153/92)  BP(mean): --  RR: 14 (08 Sep 2020 20:05) (6 - 20)  SpO2: 97% (08 Sep 2020 20:05) (93% - 100%)          PHYSICAL EXAM:  		  GENERAL: NAD, well-groomed, well-developed.  HEAD:  Atraumatic, Norm cephalic.  EYES: PERRLA, conjunctiva clear.  ENMT: no nasal discharge, no narinder-pharyngeal erythema or exudates, MMM.   NECK: Supple, No JVD.  NERVOUS SYSTEM:  Alert & oriented X3, neurologically intact grossly.  CHEST/LUNG: Good air entry B/L, no rales, rhonchi, or wheezing.  HEART: Normal S1 & S2, no murmurs, or extra sounds.  ABDOMEN: Soft, non-tender, non-distended; bowel sounds present, no organomegaly, (+) palpable full bladder.  EXTREMITIES:  No clubbing, cyanosis, or edema.  VASCULAR: 2+ radial, DPA / PTA pulses B/L.  SKIN: No rashes or lesions.  PSYCH: normal affect & behavior.          LABs:                  15.9   x     )-----------( x        ( 08 Sep 2020 20:40 )             46.1            09-08    137  |  102  |  11  ----------------------------<  203<H>  3.7   |  27  |  1.17    Ca    9.1      08 Sep 2020 20:30            COVID-19 PCR . (09.06.20 @ 11:10)    COVID-19 PCR: NotDetec: Testing is performed using polymerase chain reaction (PCR) or  transcription mediated amplification (TMA). This COVID-19 (SARS-CoV-2)  nucleic acid amplification test was validated by [employer] and is  in use under the FDA Emergency Use Authorization (EUA) for clinical labs  CLIA-certified to perform high complexity testing. Test results should be  correlated with clinical presentation, patient history, and epidemiology.          KNEE AP LAT & OBL right:    As per my review shows s/p total knee replacement with acceptable alignment. Pending official report.        EKG:    As per my review shows SR at 85/min, with frequent uniform PVCs, normal [place] & QTc intervals, normal QRS voltage, duration, and axis (+15), with normal transition, no ST-T abnormality.      - This is a 51 y/o M with PMH of NSVT s/p EPS without ablation, ADOLFO, BPH, IBS, GERD, Seasonal Allergies, OA, and Obesity who presented for right total knee arthroplasty. Patient was experiencing moderate right knee pain that was getting worse with weight bearing, was seen by ortho, and was scheduled for the surgery for today. Routine post-op medical evaluation was requested. Patient reports occasional palpitations "as usual" but denies any chest pain, SOB, dizziness, headache, paranesthesias, or focal muscle weakness.          ALLERGIES:     Ketamine, adhesive tape.          PAST MEDICAL & SURGICAL HISTORY:    Class 1 obesity with body mass index (BMI) of 31.0 to 31.9 in adult  Seasonal allergies  Sleep apnea  GERD (gastroesophageal reflux disease)  History of IBS  BPH with elevated PSA  History of palpitations  Osteoarthritis of right knee  Lumbar disc herniation: L4-5, asymptomatic  Palpitations  NSVT (nonsustained ventricular tachycardia)  History of laparoscopy: exploratory, 2010  Status post total left knee replacement: 2014  S/P hemorrhoidectomy: 2014  S/P LASIK surgery: 2013  S/P ablation of ventricular arrhythmia: 2008  S/P shoulder surgery: right 1995, left 1999  S/P knee surgery: bilateral 1984, 1991, 2007, 2008, 2013  S/P tonsillectomy and adenoidectomy: 1973          SOCIAL HISTORY:     , lives with wife & kids, , non smoker, social ETOH, no drug abuse.             FAMILY HISTORY:    Family history of alcoholism (Mother, Grandparent)  Family history of cervical cancer (Mother)            MEDICATIONS  (STANDING):    ATENolol  Tablet 12.5 milliGRAM(s) Oral daily  ceFAZolin   IVPB 2000 milliGRAM(s) IV Intermittent every 8 hours  chlorhexidine 2% Cloths 1 Application(s) Topical daily  lactated ringers. 1000 milliLiter(s) (80 mL/Hr) IV Continuous <Continuous>  lactated ringers. 1000 milliLiter(s) (100 mL/Hr) IV Continuous <Continuous>  pantoprazole    Tablet 40 milliGRAM(s) Oral before breakfast  senna 2 Tablet(s) Oral at bedtime  tamsulosin 0.4 milliGRAM(s) Oral at bedtime              MEDICATIONS  (PRN):    aluminum hydroxide/magnesium hydroxide/simethicone Suspension 30 milliLiter(s) Oral four times a day PRN Indigestion  HYDROmorphone  Injectable 0.5 milliGRAM(s) IV Push every 3 hours PRN breakthrough  magnesium hydroxide Suspension 30 milliLiter(s) Oral daily PRN Constipation  ondansetron Injectable 4 milliGRAM(s) IV Push every 6 hours PRN Nausea and/or Vomiting  oxyCODONE    IR 5 milliGRAM(s) Oral every 3 hours PRN Moderate Pain (4 - 6)  oxyCODONE    IR 10 milliGRAM(s) Oral every 3 hours PRN Severe Pain (7 - 10)  polyethylene glycol 3350 17 Gram(s) Oral daily PRN Constipation      Home Medications:    atenolol: 12.5 milligram(s) orally once a day (01 Sep 2020 14:36)  Flonase 50 mcg/inh nasal spray: 1 spray(s) in each nostril once a day (01 Sep 2020 14:36)  silodosin 8 mg oral capsule: cap(s) orally once a day (01 Sep 2020 14:36)      REVIEW OF SYSTEMS:    CONSTITUTIONAL: No fever or chills, no weight loss.  EYES: No eye pain, visual disturbances, or discharge.  ENMT:  No difficulty hearing, tinnitus, vertigo; No sinus or throat pain.  NECK: No pain or stiffness.	  RESPIRATORY: No cough, wheezing, or hemoptysis; No shortness of breath.  CARDIOVASCULAR: No chest pain, dizziness, or leg swelling, (+) occasional palpitations.  GASTROINTESTINAL: No abdominal pain, no nausea, vomiting, or hematemesis; No diarrhea or Change in bowel habits. No melena or hematochezia.  GENITOURINARY: No dysuria, frequency, hematuria, or incontinence.  NEUROLOGICAL: No headaches, focal muscle weakness, numbness, or tremors.  SKIN: No itching, burning or rashes.  MUSCULOSKELETAL: No joint swelling or pain.  PSYCHIATRIC: No depression, anxiety, or agitation.  HEME/LYMPH: No easy bruising, bleeding gums, or nose bleed.  ALLERGY AND IMMUNOLOGIC: No hives or eczema.            Vital signs:  Vital Signs Last 24 Hrs  T(C): 36.7 (08 Sep 2020 17:10), Max: 37.1 (08 Sep 2020 13:03)  T(F): 98 (08 Sep 2020 17:10), Max: 98.7 (08 Sep 2020 13:03)  HR: 90 (08 Sep 2020 20:05) (62 - 90)  BP: 132/84 (08 Sep 2020 20:05) (115/62 - 153/92)  BP(mean): --  RR: 14 (08 Sep 2020 20:05) (6 - 20)  SpO2: 97% (08 Sep 2020 20:05) (93% - 100%)          PHYSICAL EXAM:  		  GENERAL: NAD, well-groomed, well-developed.  HEAD:  Atraumatic, Norm cephalic.  EYES: PERRLA, conjunctiva clear.  ENMT: no nasal discharge, no narinder-pharyngeal erythema or exudates, MMM.   NECK: Supple, No JVD.  NERVOUS SYSTEM:  Alert & oriented X3, neurologically intact grossly.  CHEST/LUNG: Good air entry B/L, no rales, rhonchi, or wheezing.  HEART: Normal S1 & S2, no murmurs, or extra sounds.  ABDOMEN: Soft, non-tender, non-distended; bowel sounds present, no organomegaly, (+) palpable full bladder.  EXTREMITIES:  No clubbing, cyanosis, or edema.  VASCULAR: 2+ radial, DPA / PTA pulses B/L.  SKIN: No rashes or lesions.  PSYCH: normal affect & behavior.          LABs:                  15.9   x     )-----------( x        ( 08 Sep 2020 20:40 )             46.1            09-08    137  |  102  |  11  ----------------------------<  203<H>  3.7   |  27  |  1.17    Ca    9.1      08 Sep 2020 20:30            COVID-19 PCR . (09.06.20 @ 11:10)    COVID-19 PCR: NotDetec: Testing is performed using polymerase chain reaction (PCR) or  transcription mediated amplification (TMA). This COVID-19 (SARS-CoV-2)  nucleic acid amplification test was validated by [email or web address] and is  in use under the FDA Emergency Use Authorization (EUA) for clinical labs  CLIA-certified to perform high complexity testing. Test results should be  correlated with clinical presentation, patient history, and epidemiology.          KNEE AP LAT & OBL right:    As per my review shows s/p total knee replacement with acceptable alignment. Pending official report.        EKG:    As per my review shows SR at 85/min, with frequent uniform PVCs, normal [place] & QTc intervals, normal QRS voltage, duration, and axis (+15), with normal transition, no ST-T abnormality.    -

## 2020-09-08 NOTE — DISCHARGE NOTE PROVIDER - NSDCMRMEDTOKEN_GEN_ALL_CORE_FT
atenolol: 12.5 milligram(s) orally once a day  Flonase 50 mcg/inh nasal spray: 1 spray(s) in each nostril once a day  mupirocin 2% topical ointment: 1 application in each nostril 2 times a day   silodosin 8 mg oral capsule: cap(s) orally once a day acetaminophen 500 mg oral tablet: 2 tab(s) orally every 8 hours  aspirin 81 mg oral delayed release tablet: 1 tab(s) orally every 12 hours  take 2 hours before celebrex  atenolol: 12.5 milligram(s) orally once a day  celecoxib 200 mg oral capsule: 1 cap(s) orally every 12 hours MDD:2  take 2 hours after aspirin  Flonase 50 mcg/inh nasal spray: 1 spray(s) in each nostril once a day  omeprazole 20 mg oral delayed release capsule: 1 cap(s) orally once a day   oxyCODONE 5 mg oral tablet: 1 tab(s) orally every 4 hours, As Needed -Moderate Pain (4 - 6), 2 tabs every 4 hours as needed for severe pain MDD:8  senna oral tablet: 2 tab(s) orally once a day (at bedtime)  silodosin 8 mg oral capsule: cap(s) orally once a day

## 2020-09-08 NOTE — CONSULT NOTE ADULT - PROBLEM SELECTOR RECOMMENDATION 9
s/p right total knee arthroplasty, asymptomatic post-op, pain control, bowel regimen, IVF hydration, I & O monitoring, and incentive spirometry. PT & OT consults, mobilization & weight bearing as per orthopedic surgery team.

## 2020-09-08 NOTE — CONSULT NOTE ADULT - PROBLEM SELECTOR RECOMMENDATION 2
had straight cath at PACU with 750 ml of residual urine, now with full bladder again and failed to void, straight cathed, obtained another 800 ml of clear urine, continue on Flomax (formulary for Silodosin), monitor for voiding. Post-op plasma glucose of 203 mg/dl, no h/o DM, ML related to surgical stress, will get a glycohemoglobin level in am.

## 2020-09-08 NOTE — DISCHARGE NOTE PROVIDER - CARE PROVIDER_API CALL
Pee Villalpando  ORTHOPAEDIC SURGERY  [address], [address]  [place] [address]  Phone: [phone]  Fax: [phone]  Established Patient  Scheduled Appointment: 09/23/2020 11:45 AM

## 2020-09-08 NOTE — DISCHARGE NOTE PROVIDER - PROVIDER TOKENS
PROVIDER:[TOKEN:[[id number]],SCHEDULEDAPPT:[09/23/2020],SCHEDULEDAPPTTIME:[11:45 AM],ESTABLISHEDPATIENT:[T]]

## 2020-09-08 NOTE — CONSULT NOTE ADULT - ASSESSMENT
How Severe Is Your Rash?: mild Is This A New Presentation, Or A Follow-Up?: Rash Post-op 51 y/o M with PMH of NSVT s/p EPS without ablation, ADOLFO, BPH, IBS, GERD, Seasonal Allergies, OA, and Obesity with post-op urinary retention, also reports palpitations.

## 2020-09-08 NOTE — PHYSICAL THERAPY INITIAL EVALUATION ADULT - GAIT TRAINING, PT EVAL
Pt will ambulate with a RW for 150 feet independently in 1-2 days. 12 steps with SAC/HR and supervision in 1-2 days

## 2020-09-08 NOTE — CONSULT NOTE ADULT - PROBLEM SELECTOR RECOMMENDATION 4
not on C-PAP at home, "only using a pillow", on continuous telemonitoring. Chronic NSVT, a trial for ablation was not complete at EP lab, patient is aware of the arrhythmia, on Holter f/u Q 6 months with his cardiologist, continue Atenolol with hold parameters, and telemonitoring.

## 2020-09-08 NOTE — DISCHARGE NOTE PROVIDER - HOSPITAL COURSE
This patient was admitted to [facility] with a history of severe degenerative joint disease of the right knee.  Patient underwent Pre-Surgical Testing and was medically cleared to undergo elective procedure. Patient underwent right TKR by Dr. Pee Villalpando on 9/8/20. Procedure was well tolerated.  No operative or alejandrina-operative complications arose during patient's hospital course.  Patient received antibiotic according to SCIP guidelines for infection prevention.  Aspirin 81mg q 12h was given for DVT prophylaxis, in addition to the use of SCDs.  Anesthesia, Medical Hospitalist, Physical Therapy and Occupational Therapy were consulted. Patient is stable for discharge with a good prognosis.  Appropriate discharge instructions and medications are provided in this document.

## 2020-09-08 NOTE — DISCHARGE NOTE PROVIDER - NSDCFUSCHEDAPPT_GEN_ALL_CORE_FT
DINORA WESTON ; 09/23/2020 ; NPP OrthoSurg [address] [facility] DINORA WESTON ; 09/23/2020 ; NPP OrthoSurg [address] [facility]

## 2020-09-08 NOTE — DISCHARGE NOTE PROVIDER - NSDCACTIVITY_GEN_ALL_CORE
No heavy lifting/straining/Do not drive or operate machinery/Stairs allowed/Walking - Outdoors allowed/Walking - Indoors allowed

## 2020-09-08 NOTE — PHYSICAL THERAPY INITIAL EVALUATION ADULT - GENERAL OBSERVATIONS, REHAB EVAL
Pt received supine in bed in PACU. All lines intact. Pt agreeable to physical therapy. + IV + bandage right LE C/D/I

## 2020-09-09 ENCOUNTER — TRANSCRIPTION ENCOUNTER (OUTPATIENT)
Age: 50
End: 2020-09-09

## 2020-09-09 VITALS
RESPIRATION RATE: 18 BRPM | SYSTOLIC BLOOD PRESSURE: 111 MMHG | HEART RATE: 100 BPM | TEMPERATURE: 98 F | OXYGEN SATURATION: 96 % | DIASTOLIC BLOOD PRESSURE: 74 MMHG

## 2020-09-09 LAB
A1C WITH ESTIMATED AVERAGE GLUCOSE RESULT: 5.4 % — SIGNIFICANT CHANGE UP (ref 4–5.6)
A1C WITH ESTIMATED AVERAGE GLUCOSE RESULT: 5.4 % — SIGNIFICANT CHANGE UP (ref 4–5.6)
ANION GAP SERPL CALC-SCNC: 7 MMOL/L — SIGNIFICANT CHANGE UP (ref 5–17)
BUN SERPL-MCNC: 11 MG/DL — SIGNIFICANT CHANGE UP (ref 7–23)
CALCIUM SERPL-MCNC: 8.8 MG/DL — SIGNIFICANT CHANGE UP (ref 8.4–10.5)
CHLORIDE SERPL-SCNC: 102 MMOL/L — SIGNIFICANT CHANGE UP (ref 96–108)
CO2 SERPL-SCNC: 26 MMOL/L — SIGNIFICANT CHANGE UP (ref 22–31)
CREAT SERPL-MCNC: 0.85 MG/DL — SIGNIFICANT CHANGE UP (ref 0.5–1.3)
ESTIMATED AVERAGE GLUCOSE: 108 MG/DL — SIGNIFICANT CHANGE UP (ref 68–114)
ESTIMATED AVERAGE GLUCOSE: 108 MG/DL — SIGNIFICANT CHANGE UP (ref 68–114)
GLUCOSE SERPL-MCNC: 145 MG/DL — HIGH (ref 70–99)
HCT VFR BLD CALC: 41.9 % — SIGNIFICANT CHANGE UP (ref 39–50)
HGB BLD-MCNC: 14.4 G/DL — SIGNIFICANT CHANGE UP (ref 13–17)
MCHC RBC-ENTMCNC: 31.2 PG — SIGNIFICANT CHANGE UP (ref 27–34)
MCHC RBC-ENTMCNC: 34.4 GM/DL — SIGNIFICANT CHANGE UP (ref 32–36)
MCV RBC AUTO: 90.7 FL — SIGNIFICANT CHANGE UP (ref 80–100)
NRBC # BLD: 0 /100 WBCS — SIGNIFICANT CHANGE UP (ref 0–0)
PLATELET # BLD AUTO: 175 K/UL — SIGNIFICANT CHANGE UP (ref 150–400)
POTASSIUM SERPL-MCNC: 4.1 MMOL/L — SIGNIFICANT CHANGE UP (ref 3.5–5.3)
POTASSIUM SERPL-SCNC: 4.1 MMOL/L — SIGNIFICANT CHANGE UP (ref 3.5–5.3)
RBC # BLD: 4.62 M/UL — SIGNIFICANT CHANGE UP (ref 4.2–5.8)
RBC # FLD: 11.9 % — SIGNIFICANT CHANGE UP (ref 10.3–14.5)
SODIUM SERPL-SCNC: 135 MMOL/L — SIGNIFICANT CHANGE UP (ref 135–145)
WBC # BLD: 17.31 K/UL — HIGH (ref 3.8–10.5)
WBC # FLD AUTO: 17.31 K/UL — HIGH (ref 3.8–10.5)

## 2020-09-09 PROCEDURE — 86901 BLOOD TYPING SEROLOGIC RH(D): CPT

## 2020-09-09 PROCEDURE — 80048 BASIC METABOLIC PNL TOTAL CA: CPT

## 2020-09-09 PROCEDURE — 83036 HEMOGLOBIN GLYCOSYLATED A1C: CPT

## 2020-09-09 PROCEDURE — 86900 BLOOD TYPING SEROLOGIC ABO: CPT

## 2020-09-09 PROCEDURE — 97110 THERAPEUTIC EXERCISES: CPT

## 2020-09-09 PROCEDURE — 85018 HEMOGLOBIN: CPT

## 2020-09-09 PROCEDURE — C1713: CPT

## 2020-09-09 PROCEDURE — 94664 DEMO&/EVAL PT USE INHALER: CPT

## 2020-09-09 PROCEDURE — C1776: CPT

## 2020-09-09 PROCEDURE — 97165 OT EVAL LOW COMPLEX 30 MIN: CPT

## 2020-09-09 PROCEDURE — 85027 COMPLETE CBC AUTOMATED: CPT

## 2020-09-09 PROCEDURE — 97116 GAIT TRAINING THERAPY: CPT

## 2020-09-09 PROCEDURE — 73562 X-RAY EXAM OF KNEE 3: CPT

## 2020-09-09 PROCEDURE — 88311 DECALCIFY TISSUE: CPT

## 2020-09-09 PROCEDURE — 86850 RBC ANTIBODY SCREEN: CPT

## 2020-09-09 PROCEDURE — 97161 PT EVAL LOW COMPLEX 20 MIN: CPT

## 2020-09-09 PROCEDURE — 85014 HEMATOCRIT: CPT

## 2020-09-09 PROCEDURE — 97535 SELF CARE MNGMENT TRAINING: CPT

## 2020-09-09 PROCEDURE — 88305 TISSUE EXAM BY PATHOLOGIST: CPT

## 2020-09-09 PROCEDURE — 36415 COLL VENOUS BLD VENIPUNCTURE: CPT

## 2020-09-09 PROCEDURE — 99233 SBSQ HOSP IP/OBS HIGH 50: CPT

## 2020-09-09 RX ORDER — SENNA PLUS 8.6 MG/1
2 TABLET ORAL
Qty: 0 | Refills: 0 | DISCHARGE
Start: 2020-09-09

## 2020-09-09 RX ORDER — CELECOXIB 200 MG/1
1 CAPSULE ORAL
Qty: 60 | Refills: 0
Start: 2020-09-09 | End: 2020-10-08

## 2020-09-09 RX ORDER — OXYCODONE HYDROCHLORIDE 5 MG/1
1 TABLET ORAL
Qty: 42 | Refills: 0
Start: 2020-09-09 | End: 2020-09-15

## 2020-09-09 RX ORDER — OMEPRAZOLE 10 MG/1
1 CAPSULE, DELAYED RELEASE ORAL
Qty: 30 | Refills: 1
Start: 2020-09-09 | End: 2020-11-07

## 2020-09-09 RX ORDER — ACETAMINOPHEN 500 MG
2 TABLET ORAL
Qty: 0 | Refills: 0 | DISCHARGE
Start: 2020-09-09

## 2020-09-09 RX ORDER — ASPIRIN/CALCIUM CARB/MAGNESIUM 324 MG
1 TABLET ORAL
Qty: 84 | Refills: 0
Start: 2020-09-09 | End: 2020-10-20

## 2020-09-09 RX ADMIN — OXYCODONE HYDROCHLORIDE 5 MILLIGRAM(S): 5 TABLET ORAL at 12:30

## 2020-09-09 RX ADMIN — Medication 1000 MILLIGRAM(S): at 04:36

## 2020-09-09 RX ADMIN — PANTOPRAZOLE SODIUM 40 MILLIGRAM(S): 20 TABLET, DELAYED RELEASE ORAL at 06:47

## 2020-09-09 RX ADMIN — Medication 400 MILLIGRAM(S): at 04:06

## 2020-09-09 RX ADMIN — OXYCODONE HYDROCHLORIDE 5 MILLIGRAM(S): 5 TABLET ORAL at 11:45

## 2020-09-09 RX ADMIN — CELECOXIB 200 MILLIGRAM(S): 200 CAPSULE ORAL at 10:39

## 2020-09-09 RX ADMIN — OXYCODONE HYDROCHLORIDE 10 MILLIGRAM(S): 5 TABLET ORAL at 00:15

## 2020-09-09 RX ADMIN — Medication 81 MILLIGRAM(S): at 06:47

## 2020-09-09 RX ADMIN — Medication 1000 MILLIGRAM(S): at 09:51

## 2020-09-09 RX ADMIN — CELECOXIB 200 MILLIGRAM(S): 200 CAPSULE ORAL at 09:51

## 2020-09-09 RX ADMIN — Medication 100 MILLIGRAM(S): at 06:49

## 2020-09-09 RX ADMIN — SODIUM CHLORIDE 100 MILLILITER(S): 9 INJECTION, SOLUTION INTRAVENOUS at 06:41

## 2020-09-09 NOTE — PROGRESS NOTE ADULT - SUBJECTIVE AND OBJECTIVE BOX
POST OPERATIVE DAY #: 1    50y Male  s/p Right  TKA                  SUBJECTIVE: Patient seen and examined at bedside.     Pain:  well controlled       Pain scale:  3 /10  Denies CP, SOB, N/V/D, weakness, numbness   No new complains     OBJECTIVE:     Vital Signs Last 24 Hrs  T(C): 36.4 (09 Sep 2020 07:31), Max: 37.1 (08 Sep 2020 13:03)  T(F): 97.6 (09 Sep 2020 07:31), Max: 98.7 (08 Sep 2020 13:03)  HR: 73 (09 Sep 2020 07:31) (62 - 92)  BP: 117/72 (09 Sep 2020 07:31) (99/63 - 153/92)  BP(mean): --  RR: 18 (09 Sep 2020 07:31) (6 - 20)  SpO2: 96% (09 Sep 2020 07:31) (93% - 100%)    Affected extremity: RLE         Dressing: changed, nylon closure, incision clean/dry/intact                          Sensation: intact to light touch          Motor exam:  5 / 5 Tibialis Anterior/Gastrocnemius-Soleus, EHL/FHL         warm, well-perfused; capillary refill < 3 seconds         negative calf tenderness B/L LE    LABS:                        14.4   17.31 )-----------( 175      ( 09 Sep 2020 05:53 )             41.9     09-09    135  |  102  |  11  ----------------------------<  145<H>  4.1   |  26  |  0.85    Ca    8.8      09 Sep 2020 05:53        I&O's Detail    08 Sep 2020 07:01  -  09 Sep 2020 07:00  --------------------------------------------------------  IN:    IV PiggyBack: 50 mL    lactated ringers.: 2100 mL    lactated ringers.: 1100 mL    Oral Fluid: 1560 mL    Sodium Chloride 0.9% IV Bolus: 500 mL  Total IN: 5310 mL    OUT:    Estimated Blood Loss: 200 mL    Voided: 2700 mL  Total OUT: 2900 mL    Total NET: 2410 mL    MEDICATIONS:    Pain management:  acetaminophen   Tablet .. 1000 milliGRAM(s) Oral every 8 hours  celecoxib 200 milliGRAM(s) Oral every 12 hours  HYDROmorphone  Injectable 0.5 milliGRAM(s) IV Push every 3 hours PRN  ondansetron Injectable 4 milliGRAM(s) IV Push every 6 hours PRN  oxyCODONE    IR 5 milliGRAM(s) Oral every 3 hours PRN  oxyCODONE    IR 10 milliGRAM(s) Oral every 3 hours PRN    DVT prophylaxis:   aspirin enteric coated 81 milliGRAM(s) Oral every 12 hours      RADIOLOGY & ADDITIONAL STUDIES:    ASSESSMENT AND PLAN:     - Analgesic pain control  - DVT prophylaxis: ASA 81 mg twice a day      SCDs       - Pain Management: Celebrex 200mg twice a day x 21 days PO oxycodone  - PT/OT: Weight Bearing Status:  Weight bearing as tolerated, OOBTC       -  Incentive spirometry  - Advance diet as tolerated  - Hospitalist is following  -  Follow up labs  -  Disposition: Home

## 2020-09-09 NOTE — DISCHARGE NOTE NURSING/CASE MANAGEMENT/SOCIAL WORK - PATIENT PORTAL LINK FT
You can access the FollowMyHealth Patient Portal offered by [facility] by registering at the following website: [email or web address]. By joining [employer]’s FollowMyHealth portal, you will also be able to view your health information using other applications (apps) compatible with our system.

## 2020-09-09 NOTE — PROGRESS NOTE ADULT - PROBLEM SELECTOR PLAN 3
s/p straight cath x 2  has voided multiple times now  resolved  continue flomax, resume silodosin upon discharge

## 2020-09-09 NOTE — DISCHARGE NOTE NURSING/CASE MANAGEMENT/SOCIAL WORK - NSSCNAMETXT_GEN_ALL_CORE
[facility] -Nurse to visit the day after hospital discharge; physical therapist to follow. Please contact the home care agency if you have not heard from them by 12 noon on the day after your hospital discharge.

## 2020-09-09 NOTE — PROGRESS NOTE ADULT - PROBLEM SELECTOR PLAN 1
POD#1 s/p RIGHT TKR  pain controlled  bowel regimen  pt/ot  vte ppx - ASA BID  stable for discharge from medical perspective

## 2020-09-09 NOTE — PROGRESS NOTE ADULT - REASON FOR ADMISSION
Patient is a 50y old  Male who presents with a chief complaint of Right TKR for severe right knee OA (08 Sep 2020 18:09)

## 2020-09-09 NOTE — PROGRESS NOTE ADULT - SUBJECTIVE AND OBJECTIVE BOX
INTERVAL HPI/OVERNIGHT EVENTS:   Patient seen and examined.  Eating, voiding, +flatus.  No fevers, chills, sweats, dizziness, HA, changes in vision, cp, palpitations, sob, persistent cough, n/v/d, abd pain, dysuria, focal weakness, or calf pain.   No complaints.      REVIEW OF SYSTEMS:  See HPI,  all others negative    PHYSICAL EXAM:  Vital Signs Last 24 Hrs  T(C): 36.4 (09 Sep 2020 07:31), Max: 37.1 (08 Sep 2020 13:03)  T(F): 97.6 (09 Sep 2020 07:31), Max: 98.7 (08 Sep 2020 13:03)  HR: 73 (09 Sep 2020 07:31) (62 - 92)  BP: 117/72 (09 Sep 2020 07:31) (99/63 - 153/92)  BP(mean): --  RR: 18 (09 Sep 2020 07:31) (6 - 20)  SpO2: 96% (09 Sep 2020 07:31) (93% - 100%)    GENERAL: NAD, well-groomed, well-developed, awake, alert, oriented x 3, fluent and coherent speech  EYES: EOMI, PERRLA, conjunctiva and sclera clear  ENMT: No tonsillar erythema, exudates, or enlargement; Moist mucous membranes, Good dentition, No lesions seen on oral mucosa  NECK: Supple, No JVD, No Cervical LAD, No thyromegaly, No thyroid nodules felt  NERVOUS SYSTEM:  Good concentration; Moving all 4 extremities against gravity; No gross sensory deficits, No facial droop  CHEST WALL: No masses  CHEST/LUNG: Clear to auscultation bilaterally with good air entry; No rales, rhonchi, wheezing, or rubs  HEART: Regular rate and rhythm; No murmurs, rubs, or gallops  ABDOMEN: Soft, Nontender, Nondistended, Bowel sounds present, No palpable masses or organomegaly, No bruits  EXTREMITIES:  2+ Peripheral Pulses, No clubbing, cyanosis, or edema, no calf tenderness in either leg    Diagnostic Testin.4   17.31 )-----------( 175      ( 09 Sep 2020 05:53 )             41.9     09 Sep 2020 05:53    135    |  102    |  11     ----------------------------<  145    4.1     |  26     |  0.85     Ca    8.8        09 Sep 2020 05:53

## 2020-09-09 NOTE — PROGRESS NOTE ADULT - SUBJECTIVE AND OBJECTIVE BOX
Discharge medication calendar:  Aspirin EC 81mg q12h x 6 weeks  APAP 1000mg q8h x 2-3 weeks  Celecoxib 200mg q12h x 2-3 weeks  Omeprazole 20mg QAM x 6 weeks  Narcotic PRN  Docusate 100mg TID while taking narcotic  Miralax, Senna, or Bisacodyl PRN for treatment of constipation

## 2020-09-09 NOTE — PROGRESS NOTE ADULT - PROBLEM SELECTOR PLAN 4
chronic, ablation was tried but not completed, follows-up with EP at [facility]  continue atenolol  had nuclear stress test  in August 2020 - no ischemia

## 2020-09-09 NOTE — PROGRESS NOTE ADULT - ASSESSMENT
Post-op 49 y/o M with PMH of NSVT s/p EPS without ablation, ADOLFO, BPH, IBS, GERD, Seasonal Allergies, OA, and Obesity with post-op urinary retention, also reports palpitations.

## 2020-09-09 NOTE — PROGRESS NOTE ADULT - SUBJECTIVE AND OBJECTIVE BOX
ORTHOPEDIC ATTENDING PROGRESS NOTE  DINORA WESTON      50y Male                                                                                                                               POD # 1      STATUS POST:               Pre-Op Dx: Osteoarthritis of right knee    Post-Op Dx:  Osteoarthritis of right knee    Procedure: Right total knee replacement                                                Pain (0-10):  Pt reports  Current Pain Management:  [ ] PCA   [x ] Po Analgesics [ ] IM /IV [employer]     T(F): 97.6  HR: 73  BP: 117/72  RR: 18  SpO2: 96%                         14.4   17.31 )-----------( 175      ( 09 Sep 2020 05:53 )             41.9         09-09    135  |  102  |  11  ----------------------------<  145<H>  4.1   |  26  |  0.85    Ca    8.8      09 Sep 2020 05:53      Physical Exam :    -  Dressing C/D/I.   -   Distal Neurvascular status intact grossly.   -   Warm well perfused; capillary refill <3 seconds   -   (+)EHL/FHL   -   (+) Sensation to light touch  -   (-) Calf tenderness Bilaterally    A/P: 50y Male s/p Right total knee replacement     -   Ortho Stable  -   Pain control   -   Medicine to follow  -   DVT ppx:     [ ]SCDs     [x ] ASA     [ ] Eliquis     [ ] Lovenox  -   Weight bearing status:  WBAT [x ]        PWB    [ ]     TTWB  [ ]      NWB  [ ]   -  Dispo:     Home [x ]     Acute Rehab [ ]     RADHA [ ]     TBD [ ]

## 2020-09-23 ENCOUNTER — APPOINTMENT (OUTPATIENT)
Dept: ORTHOPEDIC SURGERY | Facility: CLINIC | Age: 50
End: 2020-09-23
Payer: OTHER MISCELLANEOUS

## 2020-09-23 PROCEDURE — 99024 POSTOP FOLLOW-UP VISIT: CPT

## 2020-09-23 PROCEDURE — 73562 X-RAY EXAM OF KNEE 3: CPT | Mod: LT

## 2020-09-23 PROCEDURE — 73560 X-RAY EXAM OF KNEE 1 OR 2: CPT | Mod: RT

## 2020-10-14 ENCOUNTER — OUTPATIENT (OUTPATIENT)
Dept: OUTPATIENT SERVICES | Facility: HOSPITAL | Age: 50
LOS: 1 days | End: 2020-10-14
Payer: COMMERCIAL

## 2020-10-14 ENCOUNTER — APPOINTMENT (OUTPATIENT)
Dept: MRI IMAGING | Facility: CLINIC | Age: 50
End: 2020-10-14
Payer: COMMERCIAL

## 2020-10-14 DIAGNOSIS — Z00.8 ENCOUNTER FOR OTHER GENERAL EXAMINATION: ICD-10-CM

## 2020-10-14 DIAGNOSIS — Z98.89 OTHER SPECIFIED POSTPROCEDURAL STATES: Chronic | ICD-10-CM

## 2020-10-14 DIAGNOSIS — Z96.652 PRESENCE OF LEFT ARTIFICIAL KNEE JOINT: Chronic | ICD-10-CM

## 2020-10-14 PROCEDURE — 72148 MRI LUMBAR SPINE W/O DYE: CPT

## 2020-10-14 PROCEDURE — 72148 MRI LUMBAR SPINE W/O DYE: CPT | Mod: 26

## 2020-10-21 ENCOUNTER — APPOINTMENT (OUTPATIENT)
Dept: ORTHOPEDIC SURGERY | Facility: CLINIC | Age: 50
End: 2020-10-21
Payer: OTHER MISCELLANEOUS

## 2020-10-21 PROCEDURE — 99024 POSTOP FOLLOW-UP VISIT: CPT

## 2020-10-21 PROCEDURE — 99072 ADDL SUPL MATRL&STAF TM PHE: CPT

## 2020-10-21 PROCEDURE — 73562 X-RAY EXAM OF KNEE 3: CPT | Mod: RT

## 2020-10-21 NOTE — REASON FOR VISIT
[Post Operative Visit] : a post operative visit for [Worker's Compensation] : This visit is related to worker's compensation [FreeTextEntry2] : s/p right TKR done on 9/8/2020

## 2020-10-21 NOTE — HISTORY OF PRESENT ILLNESS
[de-identified] : Worker's Compensation Visit:\par \par The patient is a 50 year old male who returns for the 2nd postoperative visit, 6 weeks s/p right total knee replacement done on 9/8/2020. He has been attending outpatient PT for his knee. He notes that after his last visit his right leg cramps became worse. He also started to have numbness and tingling into his right foot and great toe. His therapist thought it was from his back and he saw a pain management specialist, Dr. Roger Funk. He was given an injection into his back and was then sent for an MRI. He reports 40-50% symptom relief after this injection. He will follow-up in 1 week with Dr. Funk to see if he needs another injection. He notes that after his injection he has been sleeping better and his knee has started to progress. He has not been using a cane. He has been taking Celebrex, which helps. He is thrilled with his progress and result. Pshx: He is s/p left total knee replacement done 9/2015 and is doing well.\par \par Radiology Results done 9/23/2020 revealed:\par o Right Knee : Standing AP and lateral views of the knee were obtained and revealed excellent position of the total knee replacement. \par o Left Knee : Standing AP, Lateral and skyline views of the knee were obtained and revealed excellent position of the total knee replacement with central tracking of the patella.

## 2020-10-21 NOTE — DISCUSSION/SUMMARY
[de-identified] : I discussed the underlying pathophysiology of the patient's condition in great detail with the patient. I went over the patient's x-rays with them in great detail. We discussed the use of ice, Tylenol and anti-inflammatories to relieve pain. The patient was instructed in ROM exercises they are to do at home. At-home strengthening, and stretching exercises were discussed and demonstrated with the patient. We went over the wide ranging benefits of exercise for the patient health and I encouraged him to begin a diligent exercise program. He should focus on light weight and high repetition exercises. He needs to avoid high-impact activities such as running and jumping. I recommend alternative activities such as riding a stationary bike on low tension, or the elliptical. He should avoid squatting and kneeling. We are requesting from Worker’s Compensation continued physical therapy 2-3 times a week for 6 weeks. A prescription for physical therapy was provided. He should follow-up with his pain management doctor as scheduled. He should bring us a copy of his MRI report. All of his questions were answered. He understands and consents to the plan. \par \par FU 6 weeks.\par after continuing PT for his right knee.

## 2020-11-19 ENCOUNTER — APPOINTMENT (OUTPATIENT)
Dept: ORTHOPEDIC SURGERY | Facility: CLINIC | Age: 50
End: 2020-11-19
Payer: OTHER MISCELLANEOUS

## 2020-11-19 PROCEDURE — 73562 X-RAY EXAM OF KNEE 3: CPT | Mod: RT

## 2020-11-19 PROCEDURE — 20610 DRAIN/INJ JOINT/BURSA W/O US: CPT | Mod: 58,RT

## 2020-11-19 PROCEDURE — 99024 POSTOP FOLLOW-UP VISIT: CPT

## 2020-12-02 ENCOUNTER — APPOINTMENT (OUTPATIENT)
Dept: ORTHOPEDIC SURGERY | Facility: CLINIC | Age: 50
End: 2020-12-02
Payer: OTHER MISCELLANEOUS

## 2020-12-02 ENCOUNTER — NON-APPOINTMENT (OUTPATIENT)
Age: 50
End: 2020-12-02

## 2020-12-02 ENCOUNTER — OUTPATIENT (OUTPATIENT)
Dept: OUTPATIENT SERVICES | Facility: HOSPITAL | Age: 50
LOS: 1 days | End: 2020-12-02
Payer: COMMERCIAL

## 2020-12-02 VITALS — WEIGHT: 220 LBS | HEIGHT: 70 IN | BODY MASS INDEX: 31.5 KG/M2

## 2020-12-02 VITALS
RESPIRATION RATE: 13 BRPM | OXYGEN SATURATION: 98 % | SYSTOLIC BLOOD PRESSURE: 133 MMHG | DIASTOLIC BLOOD PRESSURE: 88 MMHG | HEART RATE: 87 BPM | TEMPERATURE: 97 F | HEIGHT: 70 IN | WEIGHT: 205.03 LBS

## 2020-12-02 DIAGNOSIS — Z01.818 ENCOUNTER FOR OTHER PREPROCEDURAL EXAMINATION: ICD-10-CM

## 2020-12-02 DIAGNOSIS — Z96.652 PRESENCE OF LEFT ARTIFICIAL KNEE JOINT: Chronic | ICD-10-CM

## 2020-12-02 DIAGNOSIS — Z98.89 OTHER SPECIFIED POSTPROCEDURAL STATES: Chronic | ICD-10-CM

## 2020-12-02 DIAGNOSIS — M25.461 EFFUSION, RIGHT KNEE: ICD-10-CM

## 2020-12-02 DIAGNOSIS — Z96.659 PRESENCE OF UNSPECIFIED ARTIFICIAL KNEE JOINT: Chronic | ICD-10-CM

## 2020-12-02 LAB
ALBUMIN SERPL ELPH-MCNC: 4.9 G/DL
ALP BLD-CCNC: 38 U/L
ALT SERPL-CCNC: 19 U/L
ANION GAP SERPL CALC-SCNC: 13 MMOL/L
APTT BLD: 38.1 SEC
AST SERPL-CCNC: 16 U/L
BASOPHILS # BLD AUTO: 0.03 K/UL
BASOPHILS NFR BLD AUTO: 0.4 %
BILIRUB SERPL-MCNC: 0.3 MG/DL
BUN SERPL-MCNC: 17 MG/DL
CALCIUM SERPL-MCNC: 10.2 MG/DL
CHLORIDE SERPL-SCNC: 102 MMOL/L
CO2 SERPL-SCNC: 25 MMOL/L
CREAT SERPL-MCNC: 0.75 MG/DL
EOSINOPHIL # BLD AUTO: 0.03 K/UL
EOSINOPHIL NFR BLD AUTO: 0.4 %
GLUCOSE SERPL-MCNC: 92 MG/DL
HCT VFR BLD CALC: 49 %
HGB BLD-MCNC: 16 G/DL
IMM GRANULOCYTES NFR BLD AUTO: 0.4 %
INR PPP: 1.14 RATIO
LYMPHOCYTES # BLD AUTO: 1.71 K/UL
LYMPHOCYTES NFR BLD AUTO: 24.9 %
MAN DIFF?: NORMAL
MCHC RBC-ENTMCNC: 29.5 PG
MCHC RBC-ENTMCNC: 32.7 GM/DL
MCV RBC AUTO: 90.4 FL
MONOCYTES # BLD AUTO: 0.65 K/UL
MONOCYTES NFR BLD AUTO: 9.5 %
NEUTROPHILS # BLD AUTO: 4.41 K/UL
NEUTROPHILS NFR BLD AUTO: 64.4 %
PLATELET # BLD AUTO: 260 K/UL
POTASSIUM SERPL-SCNC: 4.8 MMOL/L
PROT SERPL-MCNC: 7.4 G/DL
PT BLD: 13.4 SEC
RBC # BLD: 5.42 M/UL
RBC # FLD: 12.5 %
SODIUM SERPL-SCNC: 140 MMOL/L
WBC # FLD AUTO: 6.86 K/UL

## 2020-12-02 PROCEDURE — 99024 POSTOP FOLLOW-UP VISIT: CPT

## 2020-12-02 NOTE — H&P PST ADULT - NSICDXPASTSURGICALHX_GEN_ALL_CORE_FT
PAST SURGICAL HISTORY:  History of knee replacement 9/2020    History of laparoscopy exploratory, 2010    S/P ablation of ventricular arrhythmia 2008    S/P hemorrhoidectomy 2014    S/P knee surgery bilateral 1984, 1991, 2007, 2008, 2013    S/P LASIK surgery 2013    S/P shoulder surgery right 1995, left 1999    S/P tonsillectomy and adenoidectomy 1973    Status post total left knee replacement 2014

## 2020-12-02 NOTE — H&P PST ADULT - NSICDXPASTMEDICALHX_GEN_ALL_CORE_FT
PAST MEDICAL HISTORY:  BPH with elevated PSA     Class 1 obesity with body mass index (BMI) of 31.0 to 31.9 in adult     GERD (gastroesophageal reflux disease)     History of IBS     History of palpitations     Lumbar disc herniation L4-5, asymptomatic    NSVT (nonsustained ventricular tachycardia)     Osteoarthritis of right knee     Palpitations     Right knee pain     Seasonal allergies     Sleep apnea

## 2020-12-02 NOTE — H&P PST ADULT - HEMATOLOGY/LYMPHATICS
I saw and evaluated Mr. Guillen on the floor.  He is so far tolerating chemotherapy quite well, and only describes fatigue.  His chest tube has minimal output at this point and I anticipate that it will be ready to be removed fairly soon. He should complete his first cycle of chemo therapy tomorrow. I spoke with Dr. Zaragoza who plans on delivering chemotherapy every four weeks. I again discussed CNS radiation with the patient, and I recomme nd that he receive a dose of 30 gray and 10 fractions that we can deliver between his first and second cycles of chemotherapy. He was agreeable and signed informed consent. I will coordinate for him to undergo a treatment set up session tomorrow, and will aim to start his treatments next week as an outpatient.  negative

## 2020-12-02 NOTE — H&P PST ADULT - RS GEN PE MLT RESP DETAILS PC
respirations non-labored/good air movement/airway patent/normal/clear to auscultation bilaterally/breath sounds equal

## 2020-12-02 NOTE — H&P PST ADULT - HISTORY OF PRESENT ILLNESS
49 yo male is s/p right total knee replacement on 9/8/2020.  He was recovering without incident when he developed pain and swelling and difficult weight bearing 2 weeks ago.  Pain is constant rating 9/10.  He is scheduled for exam under anesthesia opeative arthroscopy on right total knee replacement on 12/8/2020 with Dr Villalpando at [facility].

## 2020-12-02 NOTE — H&P PST ADULT - ASSESSMENT
Exam under anesthesia operative arthroscopy on right total knee replacement is scheduled for 12/8/2020 with Dr Villalpando.  Medical and cardiac clearances requested. COVID19 PCR testing is scheduled for 12/6/2020; isolation reviewed.  pre op instructions were reviewed; contact info given; best wishes offered.

## 2020-12-03 ENCOUNTER — NON-APPOINTMENT (OUTPATIENT)
Age: 50
End: 2020-12-03

## 2020-12-03 ENCOUNTER — APPOINTMENT (OUTPATIENT)
Dept: ELECTROPHYSIOLOGY | Facility: CLINIC | Age: 50
End: 2020-12-03
Payer: COMMERCIAL

## 2020-12-03 VITALS
RESPIRATION RATE: 14 BRPM | HEART RATE: 79 BPM | OXYGEN SATURATION: 98 % | HEIGHT: 70 IN | DIASTOLIC BLOOD PRESSURE: 94 MMHG | TEMPERATURE: 97.8 F | WEIGHT: 209 LBS | BODY MASS INDEX: 29.92 KG/M2 | SYSTOLIC BLOOD PRESSURE: 134 MMHG

## 2020-12-03 DIAGNOSIS — H53.121 TRANSIENT VISUAL LOSS, RIGHT EYE: ICD-10-CM

## 2020-12-03 DIAGNOSIS — Z11.59 ENCOUNTER FOR SCREENING FOR OTHER VIRAL DISEASES: ICD-10-CM

## 2020-12-03 PROCEDURE — 99215 OFFICE O/P EST HI 40 MIN: CPT

## 2020-12-03 PROCEDURE — 99072 ADDL SUPL MATRL&STAF TM PHE: CPT

## 2020-12-03 PROCEDURE — G0463: CPT

## 2020-12-03 RX ORDER — CELECOXIB 200 MG/1
200 CAPSULE ORAL
Qty: 30 | Refills: 3 | Status: DISCONTINUED | COMMUNITY
Start: 2020-09-02 | End: 2020-12-03

## 2020-12-03 NOTE — HISTORY OF PRESENT ILLNESS
[Preoperative Visit] : for a medical evaluation prior to surgery [Good] : Good [Fever] : no fever [Chills] : no chills [Fatigue] : no fatigue [Chest Pain] : no chest pain [Cough] : no cough [Dyspnea] : no dyspnea [Dysuria] : no dysuria [Urinary Frequency] : no urinary frequency [Nausea] : no nausea [Vomiting] : no vomiting [Diarrhea] : no diarrhea [Abdominal Pain] : no abdominal pain [Easy Bruising] : no easy bruising [Lower Extremity Swelling] : no lower extremity swelling [Poor Exercise Tolerance] : no poor exercise tolerance [Diabetes] : no diabetes [Pulmonary Disease] : no pulmonary disease [Anti-Platelet Agents] : no anti-platelet agents [Nicotine Dependence] : no nicotine dependence [Alcohol Use] : no  alcohol use [Renal Disease] : no renal disease [GI Disease] : gastrointestinal disease [Sleep Apnea] : no sleep apnea [Thromboembolic Problems] : no thromboembolic problems [Frequent use of NSAIDs] : no use of NSAIDs [Transfusion Reaction] : no transfusion reaction [Impaired Immunity] : no impaired immunity [Steroid Use in Last 6 Months] : no steroid use in the last six months [Frequent Aspirin Use] : no frequent aspirin use [Prior Anesthesia] : Prior anesthesia [Prev Anesthesia Reaction] : no previous anesthesia reaction [Electrocardiogram] : ~T an ECG ~C was performed [Anesthesia Reaction] : no anesthesia reaction [Sudden Death] : no sudden death [Clotting Disorder] : no clotting disorder [Bleeding Disorder] : no bleeding disorder [FreeTextEntry1] : This is a 50-year-old gentleman with a history of PVCs and palpitations who presents for followup. He reports that his PVCs are well controlled with only rare episodes of palpitations.\par \par He is scheduled for urgent reoperation on his knee due to pain. \par \par DINORA WESTON  denies chest pain, chest pressure, shortness of breath, lightheadedness, dizziness, recent palpitations, syncope, presyncope, orthopnea, PND, or edema.

## 2020-12-03 NOTE — ASSESSMENT
[FreeTextEntry1] : This is a 50-year-old gentleman with RVOT morphology  PVCs with stable symptoms. \par \par \par \par \par

## 2020-12-03 NOTE — DISCUSSION/SUMMARY
[Procedure Low Risk] : the procedure risk is low [Patient Low Risk] : the patient is a low surgical risk [Optimized for Surgery] : the patient is optimized for surgery [As per surgery] : as per surgery [Continue] : Continue medications as currently directed [FreeTextEntry3] : atenolol [Patient] : the patient [FreeTextEntry4] : PVCs

## 2020-12-04 RX ORDER — CHLORHEXIDINE GLUCONATE 213 G/1000ML
1 SOLUTION TOPICAL DAILY
Refills: 0 | Status: DISCONTINUED | OUTPATIENT
Start: 2020-12-08 | End: 2020-12-08

## 2020-12-06 ENCOUNTER — OUTPATIENT (OUTPATIENT)
Dept: OUTPATIENT SERVICES | Facility: HOSPITAL | Age: 50
LOS: 1 days | End: 2020-12-06
Payer: COMMERCIAL

## 2020-12-06 DIAGNOSIS — Z11.59 ENCOUNTER FOR SCREENING FOR OTHER VIRAL DISEASES: ICD-10-CM

## 2020-12-06 DIAGNOSIS — Z98.89 OTHER SPECIFIED POSTPROCEDURAL STATES: Chronic | ICD-10-CM

## 2020-12-06 DIAGNOSIS — Z96.652 PRESENCE OF LEFT ARTIFICIAL KNEE JOINT: Chronic | ICD-10-CM

## 2020-12-06 DIAGNOSIS — Z96.659 PRESENCE OF UNSPECIFIED ARTIFICIAL KNEE JOINT: Chronic | ICD-10-CM

## 2020-12-06 LAB — SARS-COV-2 RNA SPEC QL NAA+PROBE: SIGNIFICANT CHANGE UP

## 2020-12-06 PROCEDURE — U0003: CPT

## 2020-12-07 ENCOUNTER — TRANSCRIPTION ENCOUNTER (OUTPATIENT)
Age: 50
End: 2020-12-07

## 2020-12-07 NOTE — ASU PATIENT PROFILE, ADULT - PSH
History of knee replacement  9/2020  History of laparoscopy  exploratory, 2010  S/P ablation of ventricular arrhythmia  2008  S/P hemorrhoidectomy  2014  S/P knee surgery  bilateral 1984, 1991, 2007, 2008, 2013  S/P LASIK surgery  2013  S/P shoulder surgery  right 1995, left 1999  S/P tonsillectomy and adenoidectomy  1973  Status post total left knee replacement  2014

## 2020-12-07 NOTE — ASU PATIENT PROFILE, ADULT - CAREGIVER ADDRESS
Date of last visit at clinic: 2/27/2017    Please complete refill and CLOSE ENCOUNTER.  Closing the encounter signifies the refill is complete.     [address] [place] [address]

## 2020-12-07 NOTE — ASU PATIENT PROFILE, ADULT - MENTAL HEALTH CONDITIONS/SYMPTOMS, PROFILE
none
I have personally seen and examined this patient.  I have fully participated in the care of this patient. I have reviewed all pertinent clinical information, including history, physical exam, plan and the Resident’s note and agree except as noted.

## 2020-12-07 NOTE — ASU PATIENT PROFILE, ADULT - PMH
BPH with elevated PSA    Class 1 obesity with body mass index (BMI) of 31.0 to 31.9 in adult    GERD (gastroesophageal reflux disease)    History of IBS    History of palpitations    Lumbar disc herniation  L4-5, asymptomatic  NSVT (nonsustained ventricular tachycardia)    Osteoarthritis of right knee    Palpitations    Right knee pain    Seasonal allergies    Sleep apnea

## 2020-12-08 ENCOUNTER — APPOINTMENT (OUTPATIENT)
Dept: ORTHOPEDIC SURGERY | Facility: HOSPITAL | Age: 50
End: 2020-12-08

## 2020-12-08 ENCOUNTER — OUTPATIENT (OUTPATIENT)
Dept: OUTPATIENT SERVICES | Facility: HOSPITAL | Age: 50
LOS: 1 days | End: 2020-12-08
Payer: COMMERCIAL

## 2020-12-08 ENCOUNTER — RESULT REVIEW (OUTPATIENT)
Age: 50
End: 2020-12-08

## 2020-12-08 VITALS
SYSTOLIC BLOOD PRESSURE: 149 MMHG | DIASTOLIC BLOOD PRESSURE: 83 MMHG | HEART RATE: 85 BPM | TEMPERATURE: 98 F | OXYGEN SATURATION: 100 % | RESPIRATION RATE: 18 BRPM

## 2020-12-08 VITALS
OXYGEN SATURATION: 98 % | DIASTOLIC BLOOD PRESSURE: 88 MMHG | HEIGHT: 69 IN | TEMPERATURE: 97 F | WEIGHT: 205.47 LBS | RESPIRATION RATE: 13 BRPM | HEART RATE: 87 BPM | SYSTOLIC BLOOD PRESSURE: 133 MMHG

## 2020-12-08 DIAGNOSIS — M25.461 EFFUSION, RIGHT KNEE: ICD-10-CM

## 2020-12-08 DIAGNOSIS — Z98.89 OTHER SPECIFIED POSTPROCEDURAL STATES: Chronic | ICD-10-CM

## 2020-12-08 DIAGNOSIS — Z96.659 PRESENCE OF UNSPECIFIED ARTIFICIAL KNEE JOINT: Chronic | ICD-10-CM

## 2020-12-08 DIAGNOSIS — Z96.652 PRESENCE OF LEFT ARTIFICIAL KNEE JOINT: Chronic | ICD-10-CM

## 2020-12-08 PROCEDURE — 87075 CULTR BACTERIA EXCEPT BLOOD: CPT

## 2020-12-08 PROCEDURE — 29876 ARTHRS KNEE SURG SYNVCT MAJ: CPT | Mod: RT

## 2020-12-08 PROCEDURE — 88304 TISSUE EXAM BY PATHOLOGIST: CPT

## 2020-12-08 PROCEDURE — 87205 SMEAR GRAM STAIN: CPT

## 2020-12-08 PROCEDURE — 87070 CULTURE OTHR SPECIMN AEROBIC: CPT

## 2020-12-08 PROCEDURE — 97161 PT EVAL LOW COMPLEX 20 MIN: CPT

## 2020-12-08 PROCEDURE — 29875 ARTHRS KNEE SURG SYNVCT LMTD: CPT | Mod: RT

## 2020-12-08 PROCEDURE — 88304 TISSUE EXAM BY PATHOLOGIST: CPT | Mod: 26

## 2020-12-08 RX ORDER — HYDROMORPHONE HYDROCHLORIDE 2 MG/ML
0.5 INJECTION INTRAMUSCULAR; INTRAVENOUS; SUBCUTANEOUS
Refills: 0 | Status: DISCONTINUED | OUTPATIENT
Start: 2020-12-08 | End: 2020-12-08

## 2020-12-08 RX ORDER — OXYCODONE HYDROCHLORIDE 5 MG/1
1 TABLET ORAL
Qty: 20 | Refills: 0
Start: 2020-12-08

## 2020-12-08 RX ORDER — SODIUM CHLORIDE 9 MG/ML
1000 INJECTION, SOLUTION INTRAVENOUS
Refills: 0 | Status: DISCONTINUED | OUTPATIENT
Start: 2020-12-08 | End: 2020-12-08

## 2020-12-08 RX ORDER — OXYCODONE HYDROCHLORIDE 5 MG/1
5 TABLET ORAL ONCE
Refills: 0 | Status: DISCONTINUED | OUTPATIENT
Start: 2020-12-08 | End: 2020-12-08

## 2020-12-08 RX ORDER — ONDANSETRON 8 MG/1
4 TABLET, FILM COATED ORAL ONCE
Refills: 0 | Status: DISCONTINUED | OUTPATIENT
Start: 2020-12-08 | End: 2020-12-08

## 2020-12-08 RX ADMIN — SODIUM CHLORIDE 75 MILLILITER(S): 9 INJECTION, SOLUTION INTRAVENOUS at 16:25

## 2020-12-08 RX ADMIN — HYDROMORPHONE HYDROCHLORIDE 0.5 MILLIGRAM(S): 2 INJECTION INTRAMUSCULAR; INTRAVENOUS; SUBCUTANEOUS at 16:14

## 2020-12-08 RX ADMIN — OXYCODONE HYDROCHLORIDE 5 MILLIGRAM(S): 5 TABLET ORAL at 17:15

## 2020-12-08 RX ADMIN — HYDROMORPHONE HYDROCHLORIDE 0.5 MILLIGRAM(S): 2 INJECTION INTRAMUSCULAR; INTRAVENOUS; SUBCUTANEOUS at 16:40

## 2020-12-08 RX ADMIN — HYDROMORPHONE HYDROCHLORIDE 0.5 MILLIGRAM(S): 2 INJECTION INTRAMUSCULAR; INTRAVENOUS; SUBCUTANEOUS at 17:00

## 2020-12-08 RX ADMIN — HYDROMORPHONE HYDROCHLORIDE 0.5 MILLIGRAM(S): 2 INJECTION INTRAMUSCULAR; INTRAVENOUS; SUBCUTANEOUS at 16:24

## 2020-12-08 RX ADMIN — OXYCODONE HYDROCHLORIDE 5 MILLIGRAM(S): 5 TABLET ORAL at 17:50

## 2020-12-08 RX ADMIN — HYDROMORPHONE HYDROCHLORIDE 0.5 MILLIGRAM(S): 2 INJECTION INTRAMUSCULAR; INTRAVENOUS; SUBCUTANEOUS at 16:44

## 2020-12-08 RX ADMIN — CHLORHEXIDINE GLUCONATE 1 APPLICATION(S): 213 SOLUTION TOPICAL at 12:43

## 2020-12-08 NOTE — ASU PREOP CHECKLIST - INTERNAL PROSTHESES
Cindi Puentes is a 3 wk.o. female who was seen by synchronous (real-time) audio-video technology on 2020. Consent:  She and/or her healthcare decision maker is aware that this patient-initiated Telehealth encounter is a billable service, with coverage as determined by her insurance carrier. She is aware that she may receive a bill and has provided verbal consent to proceed: Yes    I was at home while conducting this encounter. Assessment & Plan:   Diagnoses and all orders for this visit:    1. Vomiting,   -     REFERRAL TO PEDIATRIC GASTROENTEROLOGY  -     US INFANT HIPS; Future    2. Sacral dimple in   -     REFERRAL TO PEDIATRIC GASTROENTEROLOGY  -     US INFANT HIPS; Future    3. Family history of VATER complex  -     US INFANT HIPS; Future          Follow-up and Dispositions    · Return for 1 month [facility]. Coding Help - Use CPT Codes 93187-45588, 54095-88903 for Established and New Patients respectively, either employing EM elements or Time rules. Other codes (example consult codes) may also apply. I spent at least 25 minutes with this established patient, and >50% of the time was spent counseling and/or coordinating care regarding vomiting  and sacral dimple concerns  712  Subjective:   Cindi Frank was seen for Well Child and Weight Management      Prior to Admission medications    Medication Sig Start Date End Date Taking? Authorizing Provider   infant formula,lf-iron-dha-jamie (Elecare Infant Formula) 3.1-4.8-10.7 gram/100 kcal powd Take  by mouth. Provider, Historical   pediatric multivitamin no.81 (Poly-Vi-Sol) 750- unit-mg-unit/mL drop Take  by mouth. Provider, Historical   infant formula,lf-iron-dha-jamie (Elecare Infant Formula) 3.1-4.8-10.7 gram/100 kcal powd Take 2 oz by mouth every two (2) hours as needed (other).  3/16/20   Dave Willis MD     No Known Allergies        PHYSICAL EXAMINATION:  [ INSTRUCTIONS:  \"[x]\" Indicates a positive item  \"[]\" Indicates a negative item  -- DELETE ALL ITEMS NOT EXAMINED]  Vital Signs: (As obtained by patient/caregiver at home)  Visit Vitals  Wt 6 lb (2.722 kg)        Constitutional: [x] Appears well-developed and well-nourished [x] No apparent distress      [] Abnormal -     Mental status: [x] Alert and awake  [] Oriented to person/place/time [] Able to follow commands    [] Abnormal -     Eyes:   EOM    [x]  Normal    [] Abnormal -   Sclera  [x]  Normal    [] Abnormal -          Discharge [x]  None visible   [] Abnormal -     HENT: [x] Normocephalic, atraumatic  [] Abnormal -   [x] Mouth/Throat: Mucous membranes are moist    External Ears [x] Normal  [] Abnormal -    Neck: [x] No visualized mass [] Abnormal -     Pulmonary/Chest: [x] Respiratory effort normal   [x] No visualized signs of difficulty breathing or respiratory distress        [] Abnormal -      Musculoskeletal:   [] Normal gait with no signs of ataxia         [] Normal range of motion of neck        [] Abnormal -     Neurological:        [x] No Facial Asymmetry (Cranial nerve 7 motor function) (limited exam due to video visit)          [] No gaze palsy        [] Abnormal -          Skin:        [x] No significant exanthematous lesions or discoloration noted on facial skin         [] Abnormal -            Psychiatric:       [x] Normal Affect [] Abnormal -        [] No Hallucinations    We discussed the expected course, resolution and complications of the diagnosis(es) in detail. Medication risks, benefits, costs, interactions, and alternatives were discussed as indicated. I advised her to contact the office if her condition worsens, changes or fails to improve as anticipated. She expressed understanding with the diagnosis(es) and plan.      Pursuant to the emergency declaration under the [facility], [address] [place] [address] and the [email or web address] and [employer] Act, this Virtual  Visit was conducted, with patient's consent, to reduce the patient's risk of exposure to COVID-19 and provide continuity of care for an established patient. Services were provided through a video synchronous discussion virtually to substitute for in-person clinic visit. Jack Soares MD    HISTORY OF PRESENT ILLNESS  Cindi Diamond is a 3 wk.o. female mother. Mother calls with concerns that Cindi is vomiting with some of her feeds. She had a history of having some reflux in the NICU. She has been tolerating 50-55 cc of enfacare with iron formula every 2-3 hours. She states one week ago  she she had an episode of vomiting that caused her to choke and change colors. She was able to help the coking episode resolve by rubbing her back. She contacted the on call physician that gave instructions for reflux precautions. She has several children that have had GERD in the past and she is comfortable with the diagnosis and initial management. She states she will pull slighlty back on the volume of her feeds if she suspects the reflux has worsened until she is able to be evaluated by Dr. Alvaro Sullivan her other children's gastroenterology specialist. She feels she has been gaining a lot of weight and has recently changed her out of her premie clothing. She has plenty of wet diapers and stools usually once daily. She will keep her elevated several minutes after a feeding episode. She is also concerned that she has a sacral dimple. Her cousin has VATERS syndrome with a tethered cord that required a lot of surgical correction. She denies any lower limb problems, and has regular bowel movements. She has been healthy and happy otherwise.            HPI    Birth History    Birth     Length: 1' 6.11\" (0.46 m)     Weight: 4 lb 3.4 oz (1.91 kg)     HC 29.5 cm    Apgar     One: 8.0     Five: 9.0    Discharge Weight: 4 lb 1.8 oz (1.865 kg)    Delivery Method: , Unspecified    Gestation Age: 35 wks     Mom L7  Mom O+ Ab screen negative  Syphilis negative,   Hepatitis negative  HIV negative  Rubella Immune,   Group B Positive  Gestational DM ( insulin ), HTN and Obesity, On Labetalol and Insulin  [facility] at [facility]  24 weeks  steroid provided  Apgars: 8/9/  Hyperbilirubinemia risk secondary to prematurity and ABO incompatibility  bili 6.3 LL7 at 24 HOL  HCT 64  [place] screen pending  Hearing screen passed  CCHD passed 2020 96/97  Hep B vaccine 3/12/20  NMS- ABNORMAL Hemoglobinopathy Screen- suggestive of other Hb carrier- Hgb pattern FAV- Reported on 3/11/20     Wt Readings from Last 3 Encounters:   20 (!) 4 lb 14.5 oz (2.225 kg) (<1 %, Z= -3.59)*   20 (!) 4 lb 5 oz (1.956 kg) (<1 %, Z= -4.19)*   20 (!) 4 lb 3 oz (1.899 kg) (<1 %, Z= -4.11)*     * Growth percentiles are based on WHO (Girls, 0-2 years) data. Ht Readings from Last 3 Encounters:   20 1' 5.25\" (0.438 m) (<1 %, Z= -4.18)*   20 1' 5.25\" (0.438 m) (<1 %, Z= -3.96)*   20 1' 5.25\" (0.438 m) (<1 %, Z= -3.68)*     * Growth percentiles are based on WHO (Girls, 0-2 years) data. No weight on file for this encounter. No height on file for this encounter. No head circumference on file for this encounter.   Immunization History   Administered Date(s) Administered    Hep B Vaccine 2020     Patient Active Problem List    Diagnosis Date Noted    Abnormal findings on  screening 2020    Premature infant of 29 weeks gestation 2020     Family History   Problem Relation Age of Onset    Diabetes Mother     Hypertension Mother     Psychiatric Disorder Mother     Alcohol abuse Neg Hx     Arthritis-osteo Neg Hx     Asthma Neg Hx     Bleeding Prob Neg Hx     Cancer Neg Hx     Headache Neg Hx     Heart Disease Neg Hx     Lung Disease Neg Hx     Migraines Neg Hx     Stroke Neg Hx      ROS:  No nasal congestion  No cough  Positive vomiting  No diarrhea  No abdominal pain    Physical Exam  Visit Vitals  Wt 6 lb (2.722 kg)     Eyes: Normal  HEENT: Normal external ears Nose clear with discharge Mouth moist no obvious lesions     Neck: Normal  Chest/Breast: Normal  Lungs: unlabored breathing  Heart: deferred exam  Abdomen: Normal scaphoid appearance, soft +umbilical hernia reducible  Genitourinary: Normal female  Musculoskeletal: Normal symmetric bulk   Skin/Hair/Nails: No rashes or abnormal dyspigmentation +sacral dimple no pits or lavinia of hair   Neurologic: Alert infant in no distress    ASSESSMENT and PLAN    ICD-10-CM ICD-9-CM    1. Vomiting,  P92.09 779.33 REFERRAL TO PEDIATRIC GASTROENTEROLOGY      US INFANT HIPS   2. Sacral dimple in  Q82.6 778.8 REFERRAL TO PEDIATRIC GASTROENTEROLOGY     685.1 US INFANT HIPS   3. Family history of VATER complex Z82.79 V19.5 [facility]     Encounter Diagnoses   Name Primary?  Vomiting,  Yes    Sacral dimple in      Family history of VATER complex      Orders Placed This Encounter    US SPINAL CANAL     Standing Status:   Future     Standing Expiration Date:   2020     Scheduling Instructions:      Please schedule prior to 10weeks of age. Thanks you Dr. Ramírez Postal Specific Question:   Reason for Exam     Answer:   sacral dimple     REFERRAL TO PEDIATRIC GASTROENTEROLOGY     Referral Priority:   Routine     Referral Type:   Consultation     Referral Reason:   Specialty Services Required     Referred to Provider:   Lupe Montaño MD     Number of Visits Requested:   1     Suspect GERD: instructed mother on reflux precautions pending evaluation by Dr. Nichol Moreno   Patient Instructions          Gastroesophageal Reflux Disease (GERD) in Children: Care Instructions  Your Care Instructions    Gastroesophageal reflux disease (or GERD) occurs when stomach acids back up into the esophagus. This is the tube that takes food from your throat to your stomach.  GERD can happen in adults and older children when the area between the lower end of the esophagus and the stomach does not close tightly. It also can happen in infants. This occurs because their digestive tracts are still growing. GERD can cause babies to vomit, cry, and act fussy. They may have trouble breastfeeding or taking a bottle. Older children may have the same symptoms as adults. They may cough a lot. And they may have a burning feeling in the chest and throat. Most often GERD is not a sign that there is a serious problem. It often goes away by the end of an infant's first year. Symptoms in older children may go away with home treatment or medicines. The doctor has checked your child carefully, but problems can develop later. If you notice any problems or new symptoms, get medical treatment right away. Follow-up care is a key part of your child's treatment and safety. Be sure to make and go to all appointments, and call your doctor if your child is having problems. It's also a good idea to know your child's test results and keep a list of the medicines your child takes. How can you care for your child at home? Infants  · Burp your baby several times during a feeding. · Hold your baby upright for 30 minutes after a feeding. Older children  · Raise the head of your child's bed 6 to 8 inches. To do this, put blocks under the frame. Or you can put a foam wedge under the head of the mattress. · Have your child eat smaller meals, more often. · Limit foods and drinks that seem to make your child's condition worse. These foods may include chocolate, spicy foods, and sodas that have caffeine. Other high-acid foods are oranges and tomatoes. · Try to feed your child at least 2 to 3 hours before bedtime. This helps lower the amount of acid in the stomach when your child lies down. · Be safe with medicines. Have your child take medicines exactly as prescribed.  Call your doctor if you think your child is having a problem with his or her medicine. · Antacids such as children's versions of Rolaids, Tums, or Maalox may help. Be careful when you give your child over-the-counter antacid medicines. Many of these medicines have aspirin in them. Do not give aspirin to anyone younger than 20. It has been linked to Reye syndrome, a serious illness. · Your doctor may recommend over-the-counter acid reducers. These are medicines such as cimetidine (Tagamet HB), famotidine (Pepcid AC), omeprazole (Prilosec), or ranitidine (Zantac 75). When should you call for help? Call your doctor now or seek immediate medical care if:    · Your child's vomit is very forceful or yellow-green in color.     · Your child has signs of needing more fluids. These signs include sunken eyes with few tears, a dry mouth with little or no spit, and little or no urine for 6 hours.    Watch closely for changes in your child's health, and be sure to contact your doctor if:    · Your child does not get better as expected. Where can you learn more? Go to http://marleen-hari.info/  Enter L132 in the search box to learn more about \"Gastroesophageal Reflux Disease (GERD) in Children: Care Instructions. \"  Current as of: August 11, 2019Content Version: 12.4  © 6929-4863 [employer]wise, Incorporated. Care instructions adapted under license by [employer] (which disclaims liability or warranty for this information). If you have questions about a medical condition or this instruction, always ask your healthcare professional. [place] [address] any warranty or liability for your use of this information. Follow-up and Dispositions    · Return for 1 month [address] [facility]. no

## 2020-12-08 NOTE — ASU DISCHARGE PLAN (ADULT/PEDIATRIC) - CARE PROVIDER_API CALL
Pee Villalpando)  Orthopaedic Surgery  [address] [facility] [address]  [place] [address]  Phone: [phone]  Fax: [phone]  Follow Up Time:

## 2020-12-08 NOTE — BRIEF OPERATIVE NOTE - NSICDXBRIEFPOSTOP_GEN_ALL_CORE_FT
POST-OP DIAGNOSIS:  Arthrofibrosis of total knee replacement, initial encounter 08-Dec-2020 13:57:35  Alejandro Montelongo

## 2020-12-08 NOTE — BRIEF OPERATIVE NOTE - NSICDXBRIEFPREOP_GEN_ALL_CORE_FT
PRE-OP DIAGNOSIS:  Arthrofibrosis of total knee replacement, initial encounter 08-Dec-2020 13:58:20  Alejandro Montelongo

## 2020-12-09 LAB
GRAM STN FLD: SIGNIFICANT CHANGE UP
SPECIMEN SOURCE: SIGNIFICANT CHANGE UP

## 2020-12-10 ENCOUNTER — APPOINTMENT (OUTPATIENT)
Dept: ELECTROPHYSIOLOGY | Facility: CLINIC | Age: 50
End: 2020-12-10

## 2020-12-16 ENCOUNTER — APPOINTMENT (OUTPATIENT)
Dept: ORTHOPEDIC SURGERY | Facility: CLINIC | Age: 50
End: 2020-12-16
Payer: OTHER MISCELLANEOUS

## 2020-12-16 VITALS — DIASTOLIC BLOOD PRESSURE: 85 MMHG | SYSTOLIC BLOOD PRESSURE: 135 MMHG | HEART RATE: 80 BPM

## 2020-12-16 PROCEDURE — 99024 POSTOP FOLLOW-UP VISIT: CPT

## 2020-12-22 LAB
CULTURE RESULTS: SIGNIFICANT CHANGE UP
SPECIMEN SOURCE: SIGNIFICANT CHANGE UP

## 2021-01-15 ENCOUNTER — OUTPATIENT (OUTPATIENT)
Dept: OUTPATIENT SERVICES | Facility: HOSPITAL | Age: 51
LOS: 1 days | End: 2021-01-15
Payer: COMMERCIAL

## 2021-01-15 ENCOUNTER — APPOINTMENT (OUTPATIENT)
Dept: ULTRASOUND IMAGING | Facility: CLINIC | Age: 51
End: 2021-01-15
Payer: COMMERCIAL

## 2021-01-15 DIAGNOSIS — Z98.89 OTHER SPECIFIED POSTPROCEDURAL STATES: Chronic | ICD-10-CM

## 2021-01-15 DIAGNOSIS — Z96.659 PRESENCE OF UNSPECIFIED ARTIFICIAL KNEE JOINT: Chronic | ICD-10-CM

## 2021-01-15 DIAGNOSIS — H53.121 TRANSIENT VISUAL LOSS, RIGHT EYE: ICD-10-CM

## 2021-01-15 DIAGNOSIS — Z96.652 PRESENCE OF LEFT ARTIFICIAL KNEE JOINT: Chronic | ICD-10-CM

## 2021-01-15 PROCEDURE — 93880 EXTRACRANIAL BILAT STUDY: CPT | Mod: 26

## 2021-01-15 PROCEDURE — 93880 EXTRACRANIAL BILAT STUDY: CPT

## 2021-01-20 ENCOUNTER — APPOINTMENT (OUTPATIENT)
Dept: ORTHOPEDIC SURGERY | Facility: CLINIC | Age: 51
End: 2021-01-20
Payer: OTHER MISCELLANEOUS

## 2021-01-20 PROCEDURE — 99024 POSTOP FOLLOW-UP VISIT: CPT

## 2021-03-03 ENCOUNTER — APPOINTMENT (OUTPATIENT)
Dept: ORTHOPEDIC SURGERY | Facility: CLINIC | Age: 51
End: 2021-03-03
Payer: OTHER MISCELLANEOUS

## 2021-03-03 PROCEDURE — 99024 POSTOP FOLLOW-UP VISIT: CPT

## 2021-03-03 PROCEDURE — 73562 X-RAY EXAM OF KNEE 3: CPT | Mod: RT

## 2021-03-03 NOTE — PHYSICAL EXAM
[de-identified] : Constitutional\par o Appearance : well-nourished, well developed, alert, in no acute distress \par Head and Face\par o Head :\par ¦ Inspection : atraumatic, normocephalic\par o Face :\par ¦ Inspection : no visible rash or discoloration\par Respiratory\par o Respiratory Effort: breathing unlabored \par Neurologic\par o Sensation : Normal sensation \par Psychiatric\par o Mood and Affect: mood normal, affect appropriate \par Lymphatic\par o Additional Nodes : No palpable lymph nodes present \par \par Right Lower Extremity\par o Buttock : no tenderness, swelling or deformities \par o Right Hip :\par ¦ Inspection/Palpation : no tenderness, swelling or deformities\par ¦ Range of Motion : full and painless in all planes, no crepitance\par ¦ Stability : joint stability intact\par ¦ Strength : extension, flexion, adduction, abduction, internal rotation and external rotation, 4/5 \par \par o Right Knee :\par ¦ Inspection/Palpation : no tenderness to palpation, minimal swelling, incisions well-healed, excellent quadriceps pull through \par ¦ Range of Motion : 0-130°, painless, can SLR, no extensor lag, no crepitance\par ¦ Stability : no varus or valgus instability present on provocative testing \par ¦ Strength : flexion and extension 4/5\par ¦ Tests and Signs : negative Anterior Draw\par \par Left Lower Extremity\par o Buttock : no tenderness, swelling or deformities \par o Left Hip :\par ¦ Inspection/Palpation : no tenderness, no swelling or deformities\par ¦ Range of Motion : full and painless in all planes, no crepitance\par ¦ Stability : joint stability intact\par ¦ Strength : extension, flexion, adduction, abduction, internal rotation and external rotation, 5/5\par \par o Left Knee :\par ¦ Inspection/Palpation : no tenderness to palpation, no swelling, incision well-healed\par ¦ Range of Motion : active flexion and extension full and painless, no crepitance, good patellofemoral tracking\par ¦ Stability : no valgus or varus instability present on provocative testing\par ¦ Strength : flexion and extension 5/5\par ¦ Tests and Signs : (-) Anterior drawer \par \par Gait and Station:\par Gait: heel/toe on the right, without a cane, no significant extremity swelling or lymphedema, good proprioception and balance\par \par Radiology Results:\par o Right Knee : Standing AP, Lateral and skyline views of the knee were obtained and revealed excellent position of the total knee replacement with central tracking of the patella.

## 2021-03-03 NOTE — REASON FOR VISIT
[Follow-Up Visit] : a follow-up visit for [Worker's Compensation] : This visit is related to worker's compensation [FreeTextEntry2] : s/p arthroscopy and debridement right TKR done on 12/8/2020

## 2021-03-03 NOTE — ADDENDUM
[FreeTextEntry1] : I, Loc Hsu, acted solely as a scribe for Dr. Pee Villalpando on this date 03/03/2021.\par All medical record entries made by the Scribe were at my, Dr. Pee Villalpando, direction and personally dictated by me on 03/03/2021. I have reviewed the chart and agree that the record accurately reflects my personal performance of the history, physical exam, assessment and plan. I have also personally directed, reviewed, and agreed with the chart.

## 2021-03-03 NOTE — DISCUSSION/SUMMARY
[de-identified] : I discussed the underlying pathophysiology of the patient's condition in great detail with the patient. I went over the patient's x-rays with them in great detail. We discussed the use of ice, Tylenol and anti-inflammatories to relieve pain. At-home strengthening, and stretching exercises were discussed and demonstrated with the patient. He should focus on light weight and high repetition exercises. We are requesting from Worker’s Compensation continued physical therapy for the right knee 2-3 times a week for 6 weeks. A prescription for physical therapy was provided. All of his questions were answered. He understands and consents to the plan. \par \par FU 6 weeks.\par after continuing PT for his right knee.

## 2021-04-13 ENCOUNTER — NON-APPOINTMENT (OUTPATIENT)
Age: 51
End: 2021-04-13

## 2021-04-14 ENCOUNTER — APPOINTMENT (OUTPATIENT)
Dept: ORTHOPEDIC SURGERY | Facility: CLINIC | Age: 51
End: 2021-04-14
Payer: OTHER MISCELLANEOUS

## 2021-04-14 PROCEDURE — 99213 OFFICE O/P EST LOW 20 MIN: CPT

## 2021-04-14 PROCEDURE — 99072 ADDL SUPL MATRL&STAF TM PHE: CPT

## 2021-04-14 NOTE — DISCUSSION/SUMMARY
[de-identified] : I went over the pathophysiology of the patient's symptoms in great detail with the patient. We discussed the use of ice, Tylenol and anti-inflammatories to relieve pain. At-home strengthening, and stretching exercises were discussed and demonstrated with the patient. He needs to avoid high-impact activities such as running and jumping. I recommend alternative activities such as riding a stationary bike on low tension, or the elliptical. He should focus on light weight and high repetition exercises. He should avoid squatting and kneeling. I am recommending the patient continue going to physical therapy to obtain increases in their strength and mobility. We are requesting from Worker's Compensation authorization for continued physical therapy for the right knee 2-3 times a week for 6 weeks. A prescription for PT was provided. All of his questions were answered. He understands and consents to the plan. \par \par FU 3 months.\par after continuing PT for his right knee.

## 2021-04-14 NOTE — HISTORY OF PRESENT ILLNESS
[de-identified] : Worker's Compensation Visit:\par \par 51 year old male returns 7 months s/p right TKR done on 9/8/20. He had a manipulation on 12/8/20. He has continued attending outpatient PT and is doing much better. He notes no buckling. He does complain of stiffness and swelling after being on his feet for long periods of time. He is thrilled with his progress and result. He has been working on losing weight, and has lost 30 pounds. He is currently working. Pmh[employer], He is also s/p left TKR done 9/2015.\par \par Radiology Results done on 3/3/21:\par o Right Knee : Standing AP, Lateral and skyline views of the knee were obtained and revealed excellent position of the total knee replacement with central tracking of the patella.

## 2021-04-14 NOTE — REASON FOR VISIT
[Follow-Up Visit] : a follow-up visit for [Worker's Compensation] : This visit is related to worker's compensation [FreeTextEntry2] : s/p right TKR

## 2021-04-14 NOTE — PHYSICAL EXAM
[de-identified] : Constitutional\par o Appearance : well-nourished, well developed, alert, in no acute distress \par Head and Face\par o Head :\par ¦ Inspection : atraumatic, normocephalic\par o Face :\par ¦ Inspection : no visible rash or discoloration\par Respiratory\par o Respiratory Effort: breathing unlabored \par Neurologic\par o Sensation : Normal sensation \par Psychiatric\par o Mood and Affect: mood normal, affect appropriate \par Lymphatic\par o Additional Nodes : No palpable lymph nodes present \par \par Right Lower Extremity\par o Buttock : no tenderness, swelling or deformities \par o Right Hip :\par ¦ Inspection/Palpation : no tenderness, swelling or deformities\par ¦ Range of Motion : full and painless in all planes, no crepitance\par ¦ Stability : joint stability intact\par ¦ Strength : extension, flexion, adduction, abduction, internal rotation and external rotation, 5/5 \par \par o Right Knee :\par ¦ Inspection/Palpation : no tenderness to palpation, no swelling, incisions well-healed, excellent quadriceps pull through \par ¦ Range of Motion : 0-128°, painless, can SLR, no extensor lag, no crepitance\par ¦ Stability : no varus or valgus instability present on provocative testing \par ¦ Strength : flexion and extension 5/5\par ¦ Tests and Signs : negative Anterior Draw\par \par Left Lower Extremity\par o Buttock : no tenderness, swelling or deformities \par o Left Hip :\par ¦ Inspection/Palpation : no tenderness, no swelling or deformities\par ¦ Range of Motion : full and painless in all planes, no crepitance\par ¦ Stability : joint stability intact\par ¦ Strength : extension, flexion, adduction, abduction, internal rotation and external rotation, 5/5\par \par o Left Knee :\par ¦ Inspection/Palpation : no tenderness to palpation, no swelling, incision well-healed\par ¦ Range of Motion : active flexion and extension full and painless, no crepitance, good patellofemoral tracking\par ¦ Stability : no valgus or varus instability present on provocative testing\par ¦ Strength : flexion and extension 5/5\par ¦ Tests and Signs : (-) Anterior drawer \par \par Gait and Station:\par Gait: heel/toe on the right, without a cane, no significant extremity swelling or lymphedema, good proprioception and balance

## 2021-06-08 ENCOUNTER — APPOINTMENT (OUTPATIENT)
Dept: ELECTROPHYSIOLOGY | Facility: CLINIC | Age: 51
End: 2021-06-08
Payer: COMMERCIAL

## 2021-06-08 ENCOUNTER — NON-APPOINTMENT (OUTPATIENT)
Age: 51
End: 2021-06-08

## 2021-06-08 VITALS
WEIGHT: 210 LBS | BODY MASS INDEX: 30.06 KG/M2 | HEIGHT: 70 IN | OXYGEN SATURATION: 95 % | SYSTOLIC BLOOD PRESSURE: 144 MMHG | DIASTOLIC BLOOD PRESSURE: 79 MMHG | RESPIRATION RATE: 14 BRPM | HEART RATE: 75 BPM

## 2021-06-08 PROCEDURE — 99213 OFFICE O/P EST LOW 20 MIN: CPT

## 2021-06-08 PROCEDURE — 93000 ELECTROCARDIOGRAM COMPLETE: CPT

## 2021-06-08 PROCEDURE — 99072 ADDL SUPL MATRL&STAF TM PHE: CPT

## 2021-06-09 ENCOUNTER — OUTPATIENT (OUTPATIENT)
Dept: OUTPATIENT SERVICES | Facility: HOSPITAL | Age: 51
LOS: 1 days | End: 2021-06-09
Payer: COMMERCIAL

## 2021-06-09 ENCOUNTER — APPOINTMENT (OUTPATIENT)
Dept: ULTRASOUND IMAGING | Facility: CLINIC | Age: 51
End: 2021-06-09
Payer: COMMERCIAL

## 2021-06-09 DIAGNOSIS — Z00.8 ENCOUNTER FOR OTHER GENERAL EXAMINATION: ICD-10-CM

## 2021-06-09 DIAGNOSIS — Z96.652 PRESENCE OF LEFT ARTIFICIAL KNEE JOINT: Chronic | ICD-10-CM

## 2021-06-09 DIAGNOSIS — Z98.89 OTHER SPECIFIED POSTPROCEDURAL STATES: Chronic | ICD-10-CM

## 2021-06-09 DIAGNOSIS — N28.1 CYST OF KIDNEY, ACQUIRED: ICD-10-CM

## 2021-06-09 DIAGNOSIS — Z96.659 PRESENCE OF UNSPECIFIED ARTIFICIAL KNEE JOINT: Chronic | ICD-10-CM

## 2021-06-09 PROCEDURE — 76775 US EXAM ABDO BACK WALL LIM: CPT | Mod: 26

## 2021-06-09 PROCEDURE — 76775 US EXAM ABDO BACK WALL LIM: CPT

## 2021-07-09 NOTE — HISTORY OF PRESENT ILLNESS
[FreeTextEntry1] : This is a 50-year-old gentleman with a history of PVCs and palpitations who presents for followup. He reports that his PVCs are well controlled with only rare episodes of palpitations. DINORA WESTON  denies chest pain, chest pressure, shortness of breath, lightheadedness, dizziness, palpitations, syncope, presyncope, orthopnea, PND, or edema.

## 2021-07-13 ENCOUNTER — APPOINTMENT (OUTPATIENT)
Dept: FAMILY MEDICINE | Facility: CLINIC | Age: 51
End: 2021-07-13

## 2021-07-14 ENCOUNTER — APPOINTMENT (OUTPATIENT)
Dept: ORTHOPEDIC SURGERY | Facility: CLINIC | Age: 51
End: 2021-07-14
Payer: OTHER MISCELLANEOUS

## 2021-07-14 PROCEDURE — 99072 ADDL SUPL MATRL&STAF TM PHE: CPT

## 2021-07-14 PROCEDURE — 99213 OFFICE O/P EST LOW 20 MIN: CPT

## 2021-07-14 PROCEDURE — 73562 X-RAY EXAM OF KNEE 3: CPT | Mod: RT

## 2021-07-14 NOTE — HISTORY OF PRESENT ILLNESS
[de-identified] : Worker's Compensation Visit:\par \par 51 year old male returns 10 months s/p right TKR done on 9/8/20. He had a manipulation on 12/8/20. He is finished with PT. He has been working diligently with a home exercise program and reports that he has maintained his strength and ROM. He notes no swelling or buckling. He notes continued stiffness posteriorly and is stretching frequently. He is thrilled with his progress and result. He has been working on losing weight, and has lost 30 pounds. He is currently working. Pmhx, He is also s/p left TKR done 9/2015.

## 2021-07-14 NOTE — ADDENDUM
[FreeTextEntry1] : I, Loc Hsu, acted solely as a scribe for Dr. Pee Villalpando on this date 07/14/2021.\par All medical record entries made by the Scribe were at my, Dr. Pee Villalpando, direction and personally dictated by me on 07/14/2021. I have reviewed the chart and agree that the record accurately reflects my personal performance of the history, physical exam, assessment and plan. I have also personally directed, reviewed, and agreed with the chart.

## 2021-07-14 NOTE — PHYSICAL EXAM
[de-identified] : Constitutional\par o Appearance : well-nourished, well developed, alert, in no acute distress \par Head and Face\par o Head :\par ¦ Inspection : atraumatic, normocephalic\par o Face :\par ¦ Inspection : no visible rash or discoloration\par Respiratory\par o Respiratory Effort: breathing unlabored \par Neurologic\par o Sensation : Normal sensation \par Psychiatric\par o Mood and Affect: mood normal, affect appropriate \par Lymphatic\par o Additional Nodes : No palpable lymph nodes present \par \par Right Lower Extremity\par o Buttock : no tenderness, swelling or deformities \par o Right Hip :\par ¦ Inspection/Palpation : no tenderness, swelling or deformities\par ¦ Range of Motion : full and painless in all planes, no crepitance\par ¦ Stability : joint stability intact\par ¦ Strength : extension, flexion, adduction, abduction, internal rotation and external rotation, 5/5 \par \par o Right Knee :\par ¦ Inspection/Palpation : no tenderness to palpation, no swelling, incisions well-healed, excellent quadriceps pull through \par ¦ Range of Motion : 0-123° painless, can SLR, no extensor lag, no crepitance, good patellofemoral glide \par ¦ Stability : no varus or valgus instability present on provocative testing \par ¦ Strength : flexion and extension 5/5\par ¦ Tests and Signs : negative Anterior Draw\par \par Left Lower Extremity\par o Buttock : no tenderness, swelling or deformities \par o Left Hip :\par ¦ Inspection/Palpation : no tenderness, no swelling or deformities\par ¦ Range of Motion : full and painless in all planes, no crepitance\par ¦ Stability : joint stability intact\par ¦ Strength : extension, flexion, adduction, abduction, internal rotation and external rotation, 5/5\par \par o Left Knee :\par ¦ Inspection/Palpation : no tenderness to palpation, no swelling, incision well-healed\par ¦ Range of Motion : 0-123° painless, no crepitance, good patellofemoral tracking\par ¦ Stability : no valgus or varus instability present on provocative testing\par ¦ Strength : flexion and extension 5/5\par ¦ Tests and Signs : (-) Anterior drawer \par \par Gait and Station:\par Gait: heel/toe on the right, without a cane, no significant extremity swelling or lymphedema, good proprioception and balance\par \par Radiology Results:\par o Right Knee : Standing AP, Lateral and skyline views of the knee were obtained and revealed excellent position of his right total knee replacement with central tracking of the patella.

## 2021-07-14 NOTE — DISCUSSION/SUMMARY
[de-identified] : I discussed the underlying pathophysiology of the patient's condition in great detail with the patient. I went over the patient's x-rays with them in great detail. We discussed the use of ice, Tylenol and anti-inflammatories to relieve pain. He needs to avoid high-impact pounding activities such as running and jumping. I stressed the importance of continued weight loss and its benefits to the patient’s joints and overall health. A prescription for Keflex was provided. He was instructed to take 4 pills 1 hour before his dental work. All of his questions were answered. He understands and consents to the plan. \par \par FU 3 months.

## 2021-07-14 NOTE — REASON FOR VISIT
[Workers' Comp: Date of Injury: _______] : This visit is related to worker's compensation. Date of Injury: [unfilled] [FreeTextEntry2] : s/p right TKR

## 2021-07-16 ENCOUNTER — NON-APPOINTMENT (OUTPATIENT)
Age: 51
End: 2021-07-16

## 2021-07-16 ENCOUNTER — LABORATORY RESULT (OUTPATIENT)
Age: 51
End: 2021-07-16

## 2021-07-16 ENCOUNTER — APPOINTMENT (OUTPATIENT)
Dept: FAMILY MEDICINE | Facility: CLINIC | Age: 51
End: 2021-07-16
Payer: COMMERCIAL

## 2021-07-16 VITALS
HEIGHT: 70 IN | OXYGEN SATURATION: 97 % | WEIGHT: 210 LBS | SYSTOLIC BLOOD PRESSURE: 132 MMHG | DIASTOLIC BLOOD PRESSURE: 88 MMHG | HEART RATE: 90 BPM | BODY MASS INDEX: 30.06 KG/M2 | TEMPERATURE: 97.1 F

## 2021-07-16 DIAGNOSIS — R53.83 OTHER FATIGUE: ICD-10-CM

## 2021-07-16 PROCEDURE — 99072 ADDL SUPL MATRL&STAF TM PHE: CPT

## 2021-07-16 PROCEDURE — 93000 ELECTROCARDIOGRAM COMPLETE: CPT

## 2021-07-16 PROCEDURE — 99214 OFFICE O/P EST MOD 30 MIN: CPT | Mod: 25

## 2021-07-16 PROCEDURE — 36415 COLL VENOUS BLD VENIPUNCTURE: CPT

## 2021-07-16 NOTE — HISTORY OF PRESENT ILLNESS
[FreeTextEntry8] : Patient is here with complaint of fatigue, and malaise . He has occasional headaches as well. He suffers from occasional palpitations , diagnosed with PVCs and UTD with Cardiology \par Patient denies any complaints of URI -Sinus symptoms \par He does not recall his last lab analysis

## 2021-07-16 NOTE — REVIEW OF SYSTEMS
[Negative] : Heme/Lymph [Fatigue] : fatigue [Palpitations] : palpitations [Headache] : headache [FreeTextEntry2] : malaise

## 2021-07-16 NOTE — PLAN
[FreeTextEntry1] : Advised checking labs today due to fatigue and malaise . Will recheck ECG due to history of PVCs \par Advised pt continue to hydrate well and get enough nutrition to prevent dehydration , or hypoglycemia \par ECG performed today and is unchanged from previous ECG , showing rare PVCs

## 2021-07-17 LAB
ALBUMIN SERPL ELPH-MCNC: 5.1 G/DL
ALP BLD-CCNC: 36 U/L
ALT SERPL-CCNC: 19 U/L
ANION GAP SERPL CALC-SCNC: 11 MMOL/L
AST SERPL-CCNC: 18 U/L
BASOPHILS # BLD AUTO: 0.02 K/UL
BASOPHILS NFR BLD AUTO: 0.3 %
BILIRUB SERPL-MCNC: 0.6 MG/DL
BUN SERPL-MCNC: 14 MG/DL
CALCIUM SERPL-MCNC: 9.9 MG/DL
CHLORIDE SERPL-SCNC: 102 MMOL/L
CO2 SERPL-SCNC: 26 MMOL/L
CREAT SERPL-MCNC: 1.03 MG/DL
EOSINOPHIL # BLD AUTO: 0.03 K/UL
EOSINOPHIL NFR BLD AUTO: 0.4 %
GLUCOSE SERPL-MCNC: 70 MG/DL
HCT VFR BLD CALC: 49.2 %
HGB BLD-MCNC: 16.3 G/DL
IMM GRANULOCYTES NFR BLD AUTO: 0.1 %
LYMPHOCYTES # BLD AUTO: 1.67 K/UL
LYMPHOCYTES NFR BLD AUTO: 23.9 %
MAN DIFF?: NORMAL
MCHC RBC-ENTMCNC: 31 PG
MCHC RBC-ENTMCNC: 33.1 GM/DL
MCV RBC AUTO: 93.5 FL
MONOCYTES # BLD AUTO: 0.47 K/UL
MONOCYTES NFR BLD AUTO: 6.7 %
NEUTROPHILS # BLD AUTO: 4.79 K/UL
NEUTROPHILS NFR BLD AUTO: 68.6 %
PLATELET # BLD AUTO: 195 K/UL
POTASSIUM SERPL-SCNC: 4.4 MMOL/L
PROT SERPL-MCNC: 7.4 G/DL
RBC # BLD: 5.26 M/UL
RBC # FLD: 13.3 %
SODIUM SERPL-SCNC: 140 MMOL/L
T3RU NFR SERPL: 1 TBI
TSH SERPL-ACNC: 1.81 UIU/ML
WBC # FLD AUTO: 6.99 K/UL

## 2021-07-19 ENCOUNTER — NON-APPOINTMENT (OUTPATIENT)
Age: 51
End: 2021-07-19

## 2021-08-09 ENCOUNTER — TRANSCRIPTION ENCOUNTER (OUTPATIENT)
Age: 51
End: 2021-08-09

## 2021-08-11 ENCOUNTER — TRANSCRIPTION ENCOUNTER (OUTPATIENT)
Age: 51
End: 2021-08-11

## 2021-08-24 ENCOUNTER — NON-APPOINTMENT (OUTPATIENT)
Age: 51
End: 2021-08-24

## 2021-08-30 ENCOUNTER — APPOINTMENT (OUTPATIENT)
Dept: CT IMAGING | Facility: CLINIC | Age: 51
End: 2021-08-30
Payer: COMMERCIAL

## 2021-08-30 ENCOUNTER — OUTPATIENT (OUTPATIENT)
Dept: OUTPATIENT SERVICES | Facility: HOSPITAL | Age: 51
LOS: 1 days | End: 2021-08-30
Payer: COMMERCIAL

## 2021-08-30 DIAGNOSIS — Z96.652 PRESENCE OF LEFT ARTIFICIAL KNEE JOINT: Chronic | ICD-10-CM

## 2021-08-30 DIAGNOSIS — Z98.89 OTHER SPECIFIED POSTPROCEDURAL STATES: Chronic | ICD-10-CM

## 2021-08-30 DIAGNOSIS — Z00.8 ENCOUNTER FOR OTHER GENERAL EXAMINATION: ICD-10-CM

## 2021-08-30 DIAGNOSIS — Z96.659 PRESENCE OF UNSPECIFIED ARTIFICIAL KNEE JOINT: Chronic | ICD-10-CM

## 2021-08-30 PROCEDURE — 70486 CT MAXILLOFACIAL W/O DYE: CPT

## 2021-08-30 PROCEDURE — 70486 CT MAXILLOFACIAL W/O DYE: CPT | Mod: 26

## 2021-09-11 PROBLEM — K64.5 THROMBOSED EXTERNAL HEMORRHOID: Status: RESOLVED | Noted: 2020-06-24 | Resolved: 2021-09-11

## 2021-09-11 PROBLEM — M25.061 HEMARTHROSIS OF RIGHT KNEE: Status: RESOLVED | Noted: 2020-11-19 | Resolved: 2021-09-11

## 2021-09-11 PROBLEM — K62.89 RECTAL PAIN: Status: RESOLVED | Noted: 2020-06-24 | Resolved: 2021-09-11

## 2021-09-11 NOTE — PHYSICAL EXAM
[No Acute Distress] : no acute distress [Well Nourished] : well nourished [Well Developed] : well developed [Well-Appearing] : well-appearing [Normal Sclera/Conjunctiva] : normal sclera/conjunctiva [PERRL] : pupils equal round and reactive to light [EOMI] : extraocular movements intact [Normal Outer Ear/Nose] : the outer ears and nose were normal in appearance [Normal Oropharynx] : the oropharynx was normal [No JVD] : no jugular venous distention [No Lymphadenopathy] : no lymphadenopathy [Supple] : supple [Thyroid Normal, No Nodules] : the thyroid was normal and there were no nodules present [No Respiratory Distress] : no respiratory distress  [No Accessory Muscle Use] : no accessory muscle use [Clear to Auscultation] : lungs were clear to auscultation bilaterally [Normal Rate] : normal rate  [Regular Rhythm] : with a regular rhythm [Normal S1, S2] : normal S1 and S2 [No Murmur] : no murmur heard [No Carotid Bruits] : no carotid bruits [No Abdominal Bruit] : a ~M bruit was not heard ~T in the abdomen [Pedal Pulses Present] : the pedal pulses are present [No Varicosities] : no varicosities [No Edema] : there was no peripheral edema [No Palpable Aorta] : no palpable aorta [No Extremity Clubbing/Cyanosis] : no extremity clubbing/cyanosis [Soft] : abdomen soft [Non Tender] : non-tender [Non-distended] : non-distended [No Masses] : no abdominal mass palpated [No HSM] : no HSM [Normal Bowel Sounds] : normal bowel sounds [Normal Posterior Cervical Nodes] : no posterior cervical lymphadenopathy [Normal Anterior Cervical Nodes] : no anterior cervical lymphadenopathy [No CVA Tenderness] : no CVA  tenderness [No Spinal Tenderness] : no spinal tenderness [No Joint Swelling] : no joint swelling [No Rash] : no rash [Grossly Normal Strength/Tone] : grossly normal strength/tone [Coordination Grossly Intact] : coordination grossly intact [No Focal Deficits] : no focal deficits [Normal Gait] : normal gait [Deep Tendon Reflexes (DTR)] : deep tendon reflexes were 2+ and symmetric [Normal Affect] : the affect was normal [Normal Insight/Judgement] : insight and judgment were intact

## 2021-09-13 ENCOUNTER — APPOINTMENT (OUTPATIENT)
Dept: FAMILY MEDICINE | Facility: CLINIC | Age: 51
End: 2021-09-13
Payer: COMMERCIAL

## 2021-09-13 VITALS
HEART RATE: 60 BPM | HEIGHT: 70 IN | OXYGEN SATURATION: 97 % | BODY MASS INDEX: 30.78 KG/M2 | TEMPERATURE: 97.4 F | WEIGHT: 215 LBS | SYSTOLIC BLOOD PRESSURE: 118 MMHG | DIASTOLIC BLOOD PRESSURE: 90 MMHG

## 2021-09-13 DIAGNOSIS — Z87.09 PERSONAL HISTORY OF OTHER DISEASES OF THE RESPIRATORY SYSTEM: ICD-10-CM

## 2021-09-13 DIAGNOSIS — M25.061 HEMARTHROSIS, RIGHT KNEE: ICD-10-CM

## 2021-09-13 DIAGNOSIS — K64.5 PERIANAL VENOUS THROMBOSIS: ICD-10-CM

## 2021-09-13 DIAGNOSIS — Z23 ENCOUNTER FOR IMMUNIZATION: ICD-10-CM

## 2021-09-13 DIAGNOSIS — K62.89 OTHER SPECIFIED DISEASES OF ANUS AND RECTUM: ICD-10-CM

## 2021-09-13 PROCEDURE — G0008: CPT

## 2021-09-13 PROCEDURE — 90686 IIV4 VACC NO PRSV 0.5 ML IM: CPT

## 2021-09-13 PROCEDURE — 99396 PREV VISIT EST AGE 40-64: CPT | Mod: 25

## 2021-09-13 NOTE — PLAN
[FreeTextEntry1] : Reviewed age-appropriate preventive screening tests with patient. He is due for a flu shot and Shingrix. He agreed to a flu shot today and will get Shingrix next visit.\par \par Discussed clean eating (e.g. Mediterranean style diet) and recommendations for regular exercise/staying as physically active as possible.\par \par Reviewed importance of good self care (e.g. meditation, yoga, adequate rest, regular exercise, magnesium, clean eating, etc.).

## 2021-09-13 NOTE — ASSESSMENT
[FreeTextEntry1] : DINORA WESTON is a 51 year old male here for a physical exam. He has a history of PVCs and palpitations and is up to date with Dr. Castellon. He also has a history of BPH with urinary retention, managed by Urology. He had a visit for fatigue in July and all labs were normal at that time (CBC, CMP, thyroid function). He only needs his lipid panel checked today.

## 2021-09-13 NOTE — HISTORY OF PRESENT ILLNESS
[FreeTextEntry1] : DINORA WESTON is a 51 year old male here for a physical exam.  [de-identified] : His last PE was 6/2/20\par His last tetanus shot was 6/2/20\par He has not had Shingrix \par He had the COVID vaccine\par His last dentist visit was less than 6 months ago \par His last eye doctor appointment was less than one year ago \par His last dermatologist visit was less than one year ago \par His diet is healthy overall\par Exercise: cardio and weights\par His last colonoscopy was Fall 2019\par \par He states that he had a viral illness about a month ago. He went to Urgent Care where he tested negative for COVID. He had a negative rapid test and PCR. He took several days off from work and eventually felt better. Since this time he has a chronic cough which may be due to seasonal allergies.

## 2021-09-16 ENCOUNTER — TRANSCRIPTION ENCOUNTER (OUTPATIENT)
Age: 51
End: 2021-09-16

## 2021-09-16 LAB
CHOLEST SERPL-MCNC: 171 MG/DL
HDLC SERPL-MCNC: 49 MG/DL
LDLC SERPL CALC-MCNC: 96 MG/DL
NONHDLC SERPL-MCNC: 122 MG/DL
TRIGL SERPL-MCNC: 127 MG/DL

## 2021-10-14 ENCOUNTER — APPOINTMENT (OUTPATIENT)
Dept: ORTHOPEDIC SURGERY | Facility: CLINIC | Age: 51
End: 2021-10-14

## 2022-01-03 ENCOUNTER — APPOINTMENT (OUTPATIENT)
Dept: ORTHOPEDIC SURGERY | Facility: CLINIC | Age: 52
End: 2022-01-03
Payer: OTHER MISCELLANEOUS

## 2022-01-03 DIAGNOSIS — M17.0 BILATERAL PRIMARY OSTEOARTHRITIS OF KNEE: ICD-10-CM

## 2022-01-03 PROCEDURE — 73562 X-RAY EXAM OF KNEE 3: CPT | Mod: RT

## 2022-01-03 PROCEDURE — 99072 ADDL SUPL MATRL&STAF TM PHE: CPT

## 2022-01-03 PROCEDURE — 99213 OFFICE O/P EST LOW 20 MIN: CPT

## 2022-01-03 NOTE — HISTORY OF PRESENT ILLNESS
[de-identified] : Worker's Compensation Visit: \par \par 51 year old male returns s/p right TKR 9/8/20 and arthroscopy/arthrofibrolysis 12/8/20. He is finished with PT and has been exercising on his own. He is thrilled with his progress and result. He notes no pain and full ROM. His only complaint is tightness posteriorly. He notes no swelling or buckling. He has been working on losing weight, and has lost 30 pounds. He is currently working. Pmhx: He is also s/p left TKR done 9/2015.

## 2022-01-03 NOTE — ADDENDUM
[FreeTextEntry1] : I, Loc Hsu, acted solely as a scribe for Dr. Pee Villalpando on this date 01/03/2022.\par All medical record entries made by the Scribe were at my, Dr. Pee Villalpando, direction and personally dictated by me on 01/03/2022. I have reviewed the chart and agree that the record accurately reflects my personal performance of the history, physical exam, assessment and plan. I have also personally directed, reviewed, and agreed with the chart.

## 2022-01-03 NOTE — PHYSICAL EXAM
[de-identified] : Constitutional\par o Appearance : well-nourished, well developed, alert, in no acute distress \par Head and Face\par o Head :\par ¦ Inspection : atraumatic, normocephalic\par o Face :\par ¦ Inspection : no visible rash or discoloration\par Respiratory\par o Respiratory Effort: breathing unlabored \par Neurologic\par o Sensation : Normal sensation \par Psychiatric\par o Mood and Affect: mood normal, affect appropriate \par Lymphatic\par o Additional Nodes : No palpable lymph nodes present \par \par Right Lower Extremity\par o Buttock : no tenderness, swelling or deformities \par o Right Hip :\par ¦ Inspection/Palpation : no tenderness, swelling or deformities\par ¦ Range of Motion : full and painless in all planes, no crepitance\par ¦ Stability : joint stability intact\par ¦ Strength : extension, flexion, adduction, abduction, internal rotation and external rotation, 5/5 \par \par o Right Knee :\par ¦ Inspection/Palpation : no tenderness to palpation, no swelling, incisions well-healed, excellent quadriceps pull through \par ¦ Range of Motion : 0-120° painless, no crepitance, good patellofemoral glide \par ¦ Stability : no varus or valgus instability present on provocative testing \par ¦ Strength : flexion and extension 5/5, can SLR, no extensor lag\par ¦ Tests and Signs : negative Anterior Draw\par \par Left Lower Extremity\par o Buttock : no tenderness, swelling or deformities \par o Left Hip :\par ¦ Inspection/Palpation : no tenderness, no swelling or deformities\par ¦ Range of Motion : full and painless in all planes, no crepitance\par ¦ Stability : joint stability intact\par ¦ Strength : extension, flexion, adduction, abduction, internal rotation and external rotation, 5/5\par \par o Left Knee :\par ¦ Inspection/Palpation : no tenderness to palpation, no swelling, incision well-healed\par ¦ Range of Motion : 0-120°, painless, no crepitance, good patellofemoral tracking\par ¦ Stability : no valgus or varus instability present on provocative testing\par ¦ Strength : flexion and extension 5/5, can SLR, no extensor lag \par ¦ Tests and Signs : (-) Anterior drawer \par \par Gait and Station:\par Gait: heel/toe on the right, without a cane, no significant extremity swelling or lymphedema, good proprioception and balance, equal leg lengths\par \par Radiology Results:\par o Right Knee : Standing AP, Lateral and skyline views of the knee were obtained and revealed excellent position of his right total knee replacement with central tracking of the patella. No sign of loosening of subsidence.

## 2022-01-03 NOTE — REASON FOR VISIT
[Follow-Up Visit] : a follow-up visit for [Workers' Comp: Date of Injury: _______] : This visit is related to worker's compensation. Date of Injury: [unfilled] [FreeTextEntry2] : s/p right TKR

## 2022-01-19 ENCOUNTER — APPOINTMENT (OUTPATIENT)
Dept: ORTHOPEDIC SURGERY | Facility: CLINIC | Age: 52
End: 2022-01-19
Payer: OTHER MISCELLANEOUS

## 2022-01-19 DIAGNOSIS — Z96.651 PRESENCE OF RIGHT ARTIFICIAL KNEE JOINT: ICD-10-CM

## 2022-01-19 DIAGNOSIS — M17.11 UNILATERAL PRIMARY OSTEOARTHRITIS, RIGHT KNEE: ICD-10-CM

## 2022-01-19 PROCEDURE — 99072 ADDL SUPL MATRL&STAF TM PHE: CPT

## 2022-01-19 PROCEDURE — 99455 WORK RELATED DISABILITY EXAM: CPT

## 2022-01-19 NOTE — HISTORY OF PRESENT ILLNESS
[de-identified] : Worker's Compensation Visit: \par \par 51 year old male returns s/p right TKR 9/8/20 and arthroscopy/arthrofibrolysis 12/8/20. He is finished with PT and has been exercising on his own. He is thrilled with his progress and result. He notes no pain and full ROM. His only complaint is tightness posteriorly. He denies any buckling, swelling, or giving out of his right knee. He is working on losing weight, and has lost 30 pounds. He is currently working. He presents today for a loss of use visit. Pmhx: He is also s/p left TKR done 9/2015.\par \par Radiology Results taken on 01/03/2022:\par o Right Knee : Standing AP, Lateral and skyline views of the knee were obtained and revealed excellent position of his right total knee replacement with central tracking of the patella. No sign of loosening of subsidence.

## 2022-01-19 NOTE — ADDENDUM
[FreeTextEntry1] : I, Loc Hsu, acted solely as a scribe for Dr. Pee Villalpando on this date 01/19/2022.\par All medical record entries made by the Scribe were at my, Dr. Pee Villalpando, direction and personally dictated by me on 01/19/2022. I have reviewed the chart and agree that the record accurately reflects my personal performance of the history, physical exam, assessment and plan. I have also personally directed, reviewed, and agreed with the chart.

## 2022-01-19 NOTE — DISCUSSION/SUMMARY
[de-identified] : I went over the pathophysiology of the patient's symptoms in great detail with the patient. I advised him to keep his strength up and his weight down. He needs to avoid high-impact activities such as running and jumping. I recommend alternative activities such as riding a stationary bike on low tension or the elliptical. He should focus on light weight and high repetition exercises. He should follow-up on a yearly basis for his knee replacement. All of his questions were answered. We feel the patient has a 50% permanent partial disability with regard to his right knee. He understands and consents to the plan.\par \par FU 1 year.

## 2022-02-28 ENCOUNTER — FORM ENCOUNTER (OUTPATIENT)
Age: 52
End: 2022-02-28

## 2022-03-07 NOTE — PHYSICAL THERAPY INITIAL EVALUATION ADULT - PATIENT/FAMILY/SIGNIFICANT OTHER GOALS STATEMENT, PT EVAL
Occupational Therapy  Daily Treatment     Patient Name: Israel Aviles  Age:  55 y.o., Sex:  male  Medical Record #: [id number]  Today's Date: 3/7/2022     Precautions  Precautions: Fall Risk  Comments: L hemiparesis, occasional L inattention, L BKA         Subjective    Pt received up in w/c, agreeable to OT session     Objective       03/07/22 1301   Sitting Upper Body Exercises   Sitting Upper Body Exercises Yes   Lat Pull 2 sets of 10;Bilateral  (15#s on equilizer)   Bilateral Row 2 sets of 15;Bilateral  (35# on equilizer)   Hand Strengthening   Hand Strengthening Left Pinch   Comment completed clothespin tree using L hand placing 1x5 pins each of level 1-4 resistance on horizontal christin, able to transfer 1x5 level 1 pins from horizontal christin to vertical christin   OT Total Time Spent   OT Individual Total Time Spent (Mins) 30   OT Charge Group   OT Neuromuscular Re-education / Balance 2       Assessment    Pt tolerated session well, focus on progression of LUE function, pt with improving strength, primarily limited by poor coordination and difficulty grading muscle output but is making steady progress.     Strengths: Supportive family,Willingly participates in therapeutic activities,Pleasant and cooperative,Motivated for self care and independence,Good endurance,Good balance,Effective communication skills,Able to follow instructions  Barriers: Decreased endurance,Hemiparesis,Limited mobility,Impaired balance    Plan    LUE neuro re-education, ADLs, balance, functional transfers/mobility   w/ tech 3X week      Passport items to be completed:  Perform bathroom transfers, complete dressing, complete feeding, get ready for the day, prepare a simple meal, participate in household tasks, adapt home for safety needs, demonstrate home exercise program, complete caregiver training     Occupational Therapy Goals (Active)       Problem: Bathing       Dates: Start: 02/25/22         Goal: STG-Within one week, patient will  bathe with supervision.       Dates: Start: 02/25/22         Goal Note filed on 03/01/22 1017 by Jagruti Granados, OT       Pt cont to require min A to wash RUE                 Problem: OT Long Term Goals       Dates: Start: 02/25/22         Goal: LTG-By discharge, patient will complete basic self care tasks with mod I to supervision.        Dates: Start: 02/25/22            Goal: LTG-By discharge, patient will perform bathroom transfers with mod I to supervision.        Dates: Start: 02/25/22               To feel better and return home.

## 2022-03-09 ENCOUNTER — FORM ENCOUNTER (OUTPATIENT)
Age: 52
End: 2022-03-09

## 2022-03-27 ENCOUNTER — FORM ENCOUNTER (OUTPATIENT)
Age: 52
End: 2022-03-27

## 2022-03-28 ENCOUNTER — FORM ENCOUNTER (OUTPATIENT)
Age: 52
End: 2022-03-28

## 2022-03-30 ENCOUNTER — FORM ENCOUNTER (OUTPATIENT)
Age: 52
End: 2022-03-30

## 2022-05-22 ENCOUNTER — FORM ENCOUNTER (OUTPATIENT)
Age: 52
End: 2022-05-22

## 2022-06-07 ENCOUNTER — APPOINTMENT (OUTPATIENT)
Dept: ELECTROPHYSIOLOGY | Facility: CLINIC | Age: 52
End: 2022-06-07
Payer: COMMERCIAL

## 2022-06-07 ENCOUNTER — NON-APPOINTMENT (OUTPATIENT)
Age: 52
End: 2022-06-07

## 2022-06-07 VITALS
HEART RATE: 66 BPM | WEIGHT: 210 LBS | SYSTOLIC BLOOD PRESSURE: 120 MMHG | HEIGHT: 70 IN | BODY MASS INDEX: 30.06 KG/M2 | OXYGEN SATURATION: 96 % | DIASTOLIC BLOOD PRESSURE: 68 MMHG

## 2022-06-07 PROCEDURE — 93000 ELECTROCARDIOGRAM COMPLETE: CPT

## 2022-06-07 PROCEDURE — 99213 OFFICE O/P EST LOW 20 MIN: CPT

## 2022-06-07 NOTE — HISTORY OF PRESENT ILLNESS
[FreeTextEntry1] : This is a 52-year-old gentleman with a history of PVCs and palpitations who presents for followup. He reports that his PVCs are well controlled with only rare episodes of palpitations. DINORA GTZUTER  denies chest pain, chest pressure, shortness of breath, lightheadedness, dizziness, palpitations, syncope, presyncope, orthopnea, PND, or edema.  Had episode of diverticulitis, but no cardiac issues since last visit.

## 2022-06-07 NOTE — CARDIOLOGY SUMMARY
[de-identified] : 6/7/22 : Sinus 2 PVCs.  [de-identified] : 9/11/2020, no Ischemia  [de-identified] : 3/27/18 LVEF 60-65%.

## 2022-06-07 NOTE — ASSESSMENT
[FreeTextEntry1] : This is a 52-year-old gentleman with RVOT morphology PVCs with stable symptoms.\par \par Will obtain an MCOT to evaluate PVC burden.

## 2022-08-09 ENCOUNTER — FORM ENCOUNTER (OUTPATIENT)
Age: 52
End: 2022-08-09

## 2022-08-14 ENCOUNTER — NON-APPOINTMENT (OUTPATIENT)
Age: 52
End: 2022-08-14

## 2022-08-18 ENCOUNTER — NON-APPOINTMENT (OUTPATIENT)
Age: 52
End: 2022-08-18

## 2022-09-07 ENCOUNTER — APPOINTMENT (OUTPATIENT)
Dept: GASTROENTEROLOGY | Facility: CLINIC | Age: 52
End: 2022-09-07

## 2022-09-07 VITALS
SYSTOLIC BLOOD PRESSURE: 151 MMHG | DIASTOLIC BLOOD PRESSURE: 83 MMHG | BODY MASS INDEX: 30.78 KG/M2 | HEART RATE: 68 BPM | WEIGHT: 215 LBS | HEIGHT: 70 IN

## 2022-09-07 DIAGNOSIS — K31.89 OTHER DISEASES OF STOMACH AND DUODENUM: ICD-10-CM

## 2022-09-07 PROCEDURE — 99204 OFFICE O/P NEW MOD 45 MIN: CPT

## 2022-09-14 NOTE — ADDENDUM
[FreeTextEntry1] : I was present with NP during the history and exam. I examined the patient and discussed the case with the NP and agree with the findings and plan as documented in the NP's note. Briefly, 51 yo M with subepitheliial lesion seen on EGD, referred here for EUS. Will book procedure. \par \par Cc: Dr. Claudio Mars\par

## 2022-09-14 NOTE — HISTORY OF PRESENT ILLNESS
[de-identified] : Wilner Hicks is a 52 year old male presenting today for initial evaluation. Pt was referred by Dr. Mars for submucosal gastric polyp found on EGD, recommended to undergo endoscopic ultrasound  with possible resection.\par \par Patient is without acute complaints. No abdominal pain. No weight loss. No overt signs of GI bleeding like hematemesis, melena, hematochezia. No change in BM's. Good appetite. No post prandial issues other than reflux without dysphagia. Had screening colonoscopy and upper endoscopy with Dr. Mars and a subepithelial lesion was found in the stomach.

## 2022-09-14 NOTE — ASSESSMENT
[FreeTextEntry1] : Plan:\par Endoscopic Ultrasound with possible endoscopic mucosal resection, risks versus benefits as well as instructions reviewed. Pt agrees to planned procedure, all questions answered, seen and discussed with Dr. Beck.\par \par Cc: Dr. Mars

## 2022-09-15 ENCOUNTER — APPOINTMENT (OUTPATIENT)
Dept: FAMILY MEDICINE | Facility: CLINIC | Age: 52
End: 2022-09-15

## 2022-09-15 VITALS
SYSTOLIC BLOOD PRESSURE: 118 MMHG | WEIGHT: 217 LBS | TEMPERATURE: 97 F | OXYGEN SATURATION: 96 % | DIASTOLIC BLOOD PRESSURE: 78 MMHG | BODY MASS INDEX: 31.07 KG/M2 | HEART RATE: 73 BPM | HEIGHT: 70 IN

## 2022-09-15 PROCEDURE — 90750 HZV VACC RECOMBINANT IM: CPT

## 2022-09-15 PROCEDURE — G0008: CPT

## 2022-09-15 PROCEDURE — 99396 PREV VISIT EST AGE 40-64: CPT | Mod: 25

## 2022-09-15 PROCEDURE — 36415 COLL VENOUS BLD VENIPUNCTURE: CPT

## 2022-09-15 PROCEDURE — 90686 IIV4 VACC NO PRSV 0.5 ML IM: CPT

## 2022-09-15 PROCEDURE — 90472 IMMUNIZATION ADMIN EACH ADD: CPT

## 2022-09-15 RX ORDER — CEPHALEXIN 500 MG/1
500 TABLET ORAL
Qty: 30 | Refills: 3 | Status: DISCONTINUED | COMMUNITY
Start: 2021-07-14 | End: 2022-09-15

## 2022-09-15 NOTE — HEALTH RISK ASSESSMENT
[0] : 2) Feeling down, depressed, or hopeless: Not at all (0) [PHQ-2 Negative - No further assessment needed] : PHQ-2 Negative - No further assessment needed [[id number]] : 0

## 2022-09-15 NOTE — HISTORY OF PRESENT ILLNESS
[FreeTextEntry1] : DINORA WESTON is a 52 year old male here for a physical exam.\par  [de-identified] : His last physical exam was last year\par \par Vaccines:\par Tetanus is up to date; last 6/2020\par Shingrix is NOT up to date\par COVID vaccine is up to date\par \par His last dentist visit was less than one year ago\par His last eye doctor appointment was less than one year ago\par His last dermatologist visit was less than one year ago (Dr. Sherman)\par \par Colon cancer screening is up to date; colonoscopy 12/2019, 5 yr f/u recommended \par \par His diet is healthy overall\par Exercise: cardio and weights

## 2022-09-15 NOTE — PLAN
[FreeTextEntry1] : Continue all medications as prescribed. Check labs as above. Will adjust any medications based upon lab results.\par \par Reviewed age-appropriate preventive screening tests with patient. He agreed to a flu shot and Shingrix today. He will return for Shingrix #2 in 2 months.\par \par Discussed clean eating (e.g. Mediterranean style diet) and recommendations for regular exercise/staying as physically active as possible.\par \par Reviewed importance of good self care (e.g. meditation, yoga, adequate rest, regular exercise, magnesium, clean eating, etc.).\par \par Follow up for next physical in one year.\par

## 2022-09-15 NOTE — ASSESSMENT
[FreeTextEntry1] : DINORA WESTON is a 52 year old male here for a physical exam.\par \par Patient has a history of osteoarthritis and a right total knee replacement. He has a history of PVCs and palpitations and is up to date with Dr. Castellon. He also has a history of BPH with urinary retention, managed by Urology. \par \par He sees a cardiologist regularly and does not need an EKG today.\par

## 2022-09-16 ENCOUNTER — TRANSCRIPTION ENCOUNTER (OUTPATIENT)
Age: 52
End: 2022-09-16

## 2022-09-16 LAB
ALBUMIN SERPL ELPH-MCNC: 4.8 G/DL
ALP BLD-CCNC: 31 U/L
ALT SERPL-CCNC: 23 U/L
ANION GAP SERPL CALC-SCNC: 10 MMOL/L
AST SERPL-CCNC: 19 U/L
BILIRUB SERPL-MCNC: 0.6 MG/DL
BUN SERPL-MCNC: 15 MG/DL
CALCIUM SERPL-MCNC: 9.8 MG/DL
CHLORIDE SERPL-SCNC: 105 MMOL/L
CHOLEST SERPL-MCNC: 166 MG/DL
CO2 SERPL-SCNC: 26 MMOL/L
CREAT SERPL-MCNC: 0.98 MG/DL
EGFR: 92 ML/MIN/1.73M2
GLUCOSE SERPL-MCNC: 98 MG/DL
HDLC SERPL-MCNC: 45 MG/DL
LDLC SERPL CALC-MCNC: 105 MG/DL
NONHDLC SERPL-MCNC: 121 MG/DL
POTASSIUM SERPL-SCNC: 4.4 MMOL/L
PROT SERPL-MCNC: 7.1 G/DL
SODIUM SERPL-SCNC: 141 MMOL/L
TRIGL SERPL-MCNC: 78 MG/DL

## 2022-09-26 ENCOUNTER — NON-APPOINTMENT (OUTPATIENT)
Age: 52
End: 2022-09-26

## 2022-09-27 ENCOUNTER — NON-APPOINTMENT (OUTPATIENT)
Age: 52
End: 2022-09-27

## 2022-10-02 ENCOUNTER — FORM ENCOUNTER (OUTPATIENT)
Age: 52
End: 2022-10-02

## 2022-10-05 ENCOUNTER — OUTPATIENT (OUTPATIENT)
Dept: OUTPATIENT SERVICES | Facility: HOSPITAL | Age: 52
LOS: 1 days | End: 2022-10-05
Payer: COMMERCIAL

## 2022-10-05 VITALS
RESPIRATION RATE: 14 BRPM | TEMPERATURE: 98 F | SYSTOLIC BLOOD PRESSURE: 128 MMHG | HEART RATE: 70 BPM | HEIGHT: 70 IN | DIASTOLIC BLOOD PRESSURE: 79 MMHG | OXYGEN SATURATION: 100 % | WEIGHT: 220.02 LBS

## 2022-10-05 DIAGNOSIS — Z98.89 OTHER SPECIFIED POSTPROCEDURAL STATES: Chronic | ICD-10-CM

## 2022-10-05 DIAGNOSIS — Z96.652 PRESENCE OF LEFT ARTIFICIAL KNEE JOINT: Chronic | ICD-10-CM

## 2022-10-05 DIAGNOSIS — K31.7 POLYP OF STOMACH AND DUODENUM: ICD-10-CM

## 2022-10-05 DIAGNOSIS — Z96.659 PRESENCE OF UNSPECIFIED ARTIFICIAL KNEE JOINT: Chronic | ICD-10-CM

## 2022-10-05 LAB
ANION GAP SERPL CALC-SCNC: 7 MMOL/L — SIGNIFICANT CHANGE UP (ref 5–17)
APTT BLD: 38.3 SEC — HIGH (ref 27.5–35.5)
BASOPHILS # BLD AUTO: 0.05 K/UL — SIGNIFICANT CHANGE UP (ref 0–0.2)
BASOPHILS NFR BLD AUTO: 0.8 % — SIGNIFICANT CHANGE UP (ref 0–2)
BUN SERPL-MCNC: 20 MG/DL — SIGNIFICANT CHANGE UP (ref 7–23)
CALCIUM SERPL-MCNC: 9.4 MG/DL — SIGNIFICANT CHANGE UP (ref 8.5–10.1)
CHLORIDE SERPL-SCNC: 108 MMOL/L — SIGNIFICANT CHANGE UP (ref 96–108)
CO2 SERPL-SCNC: 25 MMOL/L — SIGNIFICANT CHANGE UP (ref 22–31)
CREAT SERPL-MCNC: 0.88 MG/DL — SIGNIFICANT CHANGE UP (ref 0.5–1.3)
EGFR: 103 ML/MIN/1.73M2 — SIGNIFICANT CHANGE UP
EOSINOPHIL # BLD AUTO: 0.08 K/UL — SIGNIFICANT CHANGE UP (ref 0–0.5)
EOSINOPHIL NFR BLD AUTO: 1.3 % — SIGNIFICANT CHANGE UP (ref 0–6)
GLUCOSE SERPL-MCNC: 103 MG/DL — HIGH (ref 70–99)
HCT VFR BLD CALC: 47 % — SIGNIFICANT CHANGE UP (ref 39–50)
HGB BLD-MCNC: 16.3 G/DL — SIGNIFICANT CHANGE UP (ref 13–17)
IMM GRANULOCYTES NFR BLD AUTO: 0.2 % — SIGNIFICANT CHANGE UP (ref 0–0.9)
INR BLD: 1.14 RATIO — SIGNIFICANT CHANGE UP (ref 0.88–1.16)
LYMPHOCYTES # BLD AUTO: 1.76 K/UL — SIGNIFICANT CHANGE UP (ref 1–3.3)
LYMPHOCYTES # BLD AUTO: 28.9 % — SIGNIFICANT CHANGE UP (ref 13–44)
MCHC RBC-ENTMCNC: 31.7 PG — SIGNIFICANT CHANGE UP (ref 27–34)
MCHC RBC-ENTMCNC: 34.7 GM/DL — SIGNIFICANT CHANGE UP (ref 32–36)
MCV RBC AUTO: 91.3 FL — SIGNIFICANT CHANGE UP (ref 80–100)
MONOCYTES # BLD AUTO: 0.53 K/UL — SIGNIFICANT CHANGE UP (ref 0–0.9)
MONOCYTES NFR BLD AUTO: 8.7 % — SIGNIFICANT CHANGE UP (ref 2–14)
NEUTROPHILS # BLD AUTO: 3.66 K/UL — SIGNIFICANT CHANGE UP (ref 1.8–7.4)
NEUTROPHILS NFR BLD AUTO: 60.1 % — SIGNIFICANT CHANGE UP (ref 43–77)
PLATELET # BLD AUTO: 181 K/UL — SIGNIFICANT CHANGE UP (ref 150–400)
POTASSIUM SERPL-MCNC: 4.1 MMOL/L — SIGNIFICANT CHANGE UP (ref 3.5–5.3)
POTASSIUM SERPL-SCNC: 4.1 MMOL/L — SIGNIFICANT CHANGE UP (ref 3.5–5.3)
PROTHROM AB SERPL-ACNC: 13.3 SEC — SIGNIFICANT CHANGE UP (ref 10.5–13.4)
RBC # BLD: 5.15 M/UL — SIGNIFICANT CHANGE UP (ref 4.2–5.8)
RBC # FLD: 12.6 % — SIGNIFICANT CHANGE UP (ref 10.3–14.5)
SODIUM SERPL-SCNC: 140 MMOL/L — SIGNIFICANT CHANGE UP (ref 135–145)
WBC # BLD: 6.09 K/UL — SIGNIFICANT CHANGE UP (ref 3.8–10.5)
WBC # FLD AUTO: 6.09 K/UL — SIGNIFICANT CHANGE UP (ref 3.8–10.5)

## 2022-10-05 PROCEDURE — 99214 OFFICE O/P EST MOD 30 MIN: CPT | Mod: 25

## 2022-10-05 PROCEDURE — 85610 PROTHROMBIN TIME: CPT

## 2022-10-05 PROCEDURE — 36415 COLL VENOUS BLD VENIPUNCTURE: CPT

## 2022-10-05 PROCEDURE — 80048 BASIC METABOLIC PNL TOTAL CA: CPT

## 2022-10-05 PROCEDURE — 85025 COMPLETE CBC W/AUTO DIFF WBC: CPT

## 2022-10-05 PROCEDURE — 85730 THROMBOPLASTIN TIME PARTIAL: CPT

## 2022-10-05 RX ORDER — FLUTICASONE PROPIONATE 50 MCG
1 SPRAY, SUSPENSION NASAL
Qty: 0 | Refills: 0 | DISCHARGE

## 2022-10-05 NOTE — H&P PST ADULT - ASSESSMENT
51 y/o male presents to [facility] for scheduled endoscopy on 10/6/22. Patient with hx of abdominal polyp 3 years ago during routine follow up polyp noted and referred to surgeon.

## 2022-10-05 NOTE — H&P PST ADULT - NSICDXPASTSURGICALHX_GEN_ALL_CORE_FT
PAST SURGICAL HISTORY:  History of knee replacement 9/2020    History of laparoscopy exploratory, 2010    S/P ablation of ventricular arrhythmia 2008    S/P hemorrhoidectomy 2014    S/P knee surgery bilateral 1984, 1991, 2007, 2008, 2013    S/P LASIK surgery 2013    S/P shoulder surgery right 1995, left 1999    S/P tonsillectomy and adenoidectomy 1973    Status post total left knee replacement 2014     PAST SURGICAL HISTORY:  History of knee replacement 9/2020- right knee replacment    History of laparoscopy exploratory, 2010    S/P ablation of ventricular arrhythmia 2008    S/P hemorrhoidectomy 2014    S/P knee surgery bilateral 1984, 1991, 2007, 2008, 2013    S/P LASIK surgery 2013    S/P shoulder surgery right 1995, left 1999    S/P tonsillectomy and adenoidectomy 1973    Status post total left knee replacement 2014

## 2022-10-05 NOTE — H&P PST ADULT - NSICDXPASTMEDICALHX_GEN_ALL_CORE_FT
PAST MEDICAL HISTORY:  BPH with elevated PSA     Class 1 obesity with body mass index (BMI) of 31.0 to 31.9 in adult     GERD (gastroesophageal reflux disease)     History of IBS     History of palpitations     Lumbar disc herniation L4-5, asymptomatic    NSVT (nonsustained ventricular tachycardia)     Osteoarthritis of right knee     Palpitations     Right knee pain     Seasonal allergies     Sleep apnea      PAST MEDICAL HISTORY:  BPH with elevated PSA     Class 1 obesity with body mass index (BMI) of 31.0 to 31.9 in adult     GERD (gastroesophageal reflux disease)     History of IBS     Lumbar disc herniation L4-5, asymptomatic    NSVT (nonsustained ventricular tachycardia)     Osteoarthritis of right knee     Palpitations     Seasonal allergies     Sleep apnea

## 2022-10-05 NOTE — H&P PST ADULT - HISTORY OF PRESENT ILLNESS
53 y/o male presents to [facility] for scheduled endoscopy on 10/6/22. Patient with hx of abdominal polyp 3 years ago during routine follow up polyp noted and referred to surgeon.

## 2022-10-05 NOTE — H&P PST ADULT - PROBLEM SELECTOR PLAN 1
1. Expect a call from Endoscopy the day before your procedure between 11am and 3pm.  2. Follow the GI doctor's instructions for preparation  and day before procedure activities.  3. Follow the GI doctor's  instructions for medications.   4. Covid swab completed and resulted in [place]

## 2022-10-06 ENCOUNTER — TRANSCRIPTION ENCOUNTER (OUTPATIENT)
Age: 52
End: 2022-10-06

## 2022-10-06 ENCOUNTER — APPOINTMENT (OUTPATIENT)
Dept: GASTROENTEROLOGY | Facility: HOSPITAL | Age: 52
End: 2022-10-06

## 2022-10-06 ENCOUNTER — RESULT REVIEW (OUTPATIENT)
Age: 52
End: 2022-10-06

## 2022-10-06 ENCOUNTER — OUTPATIENT (OUTPATIENT)
Dept: OUTPATIENT SERVICES | Facility: HOSPITAL | Age: 52
LOS: 1 days | Discharge: HOME | End: 2022-10-06
Payer: COMMERCIAL

## 2022-10-06 VITALS
RESPIRATION RATE: 16 BRPM | SYSTOLIC BLOOD PRESSURE: 120 MMHG | DIASTOLIC BLOOD PRESSURE: 76 MMHG | TEMPERATURE: 97 F | OXYGEN SATURATION: 98 % | HEART RATE: 62 BPM

## 2022-10-06 VITALS
TEMPERATURE: 98 F | SYSTOLIC BLOOD PRESSURE: 140 MMHG | HEIGHT: 70 IN | WEIGHT: 220.02 LBS | RESPIRATION RATE: 16 BRPM | HEART RATE: 66 BPM | OXYGEN SATURATION: 98 % | DIASTOLIC BLOOD PRESSURE: 88 MMHG

## 2022-10-06 DIAGNOSIS — Z98.89 OTHER SPECIFIED POSTPROCEDURAL STATES: Chronic | ICD-10-CM

## 2022-10-06 DIAGNOSIS — Z96.652 PRESENCE OF LEFT ARTIFICIAL KNEE JOINT: Chronic | ICD-10-CM

## 2022-10-06 DIAGNOSIS — K31.7 POLYP OF STOMACH AND DUODENUM: ICD-10-CM

## 2022-10-06 DIAGNOSIS — Z96.659 PRESENCE OF UNSPECIFIED ARTIFICIAL KNEE JOINT: Chronic | ICD-10-CM

## 2022-10-06 PROCEDURE — 93005 ELECTROCARDIOGRAM TRACING: CPT

## 2022-10-06 PROCEDURE — 88305 TISSUE EXAM BY PATHOLOGIST: CPT

## 2022-10-06 PROCEDURE — 43259 EGD US EXAM DUODENUM/JEJUNUM: CPT

## 2022-10-06 PROCEDURE — 88312 SPECIAL STAINS GROUP 1: CPT

## 2022-10-06 PROCEDURE — 93010 ELECTROCARDIOGRAM REPORT: CPT

## 2022-10-06 PROCEDURE — 88312 SPECIAL STAINS GROUP 1: CPT | Mod: 26

## 2022-10-06 PROCEDURE — 43239 EGD BIOPSY SINGLE/MULTIPLE: CPT

## 2022-10-06 PROCEDURE — 88305 TISSUE EXAM BY PATHOLOGIST: CPT | Mod: 26

## 2022-10-06 RX ORDER — SODIUM CHLORIDE 9 MG/ML
1000 INJECTION, SOLUTION INTRAVENOUS
Refills: 0 | Status: DISCONTINUED | OUTPATIENT
Start: 2022-10-06 | End: 2022-10-06

## 2022-10-06 RX ORDER — L.ACIDOPH/B.ANIMALIS/B.LONGUM 15B CELL
1 CAPSULE ORAL
Qty: 0 | Refills: 0 | DISCHARGE

## 2022-10-06 RX ORDER — ONDANSETRON 8 MG/1
4 TABLET, FILM COATED ORAL ONCE
Refills: 0 | Status: DISCONTINUED | OUTPATIENT
Start: 2022-10-06 | End: 2022-10-06

## 2022-10-06 RX ORDER — SILODOSIN 4 MG/1
0 CAPSULE ORAL
Qty: 0 | Refills: 0 | DISCHARGE

## 2022-10-06 RX ORDER — OXYCODONE HYDROCHLORIDE 5 MG/1
5 TABLET ORAL ONCE
Refills: 0 | Status: DISCONTINUED | OUTPATIENT
Start: 2022-10-06 | End: 2022-10-06

## 2022-10-06 RX ORDER — FENTANYL CITRATE 50 UG/ML
50 INJECTION INTRAVENOUS
Refills: 0 | Status: DISCONTINUED | OUTPATIENT
Start: 2022-10-06 | End: 2022-10-06

## 2022-10-06 NOTE — ASU PATIENT PROFILE, ADULT - NSICDXPASTMEDICALHX_GEN_ALL_CORE_FT
PAST MEDICAL HISTORY:  BPH with elevated PSA     Class 1 obesity with body mass index (BMI) of 31.0 to 31.9 in adult     GERD (gastroesophageal reflux disease)     H/O hemorrhoids     History of dyspnea     History of IBS     Lumbar disc herniation L4-5, asymptomatic    NSVT (nonsustained ventricular tachycardia)     Osteoarthritis of right knee     Palpitations     Seasonal allergies     Sleep apnea

## 2022-10-06 NOTE — ASU DISCHARGE PLAN (ADULT/PEDIATRIC) - NS MD DC FALL RISK RISK
For information on Fall & Injury Prevention, visit: [email or web address] OR  [email or web address] OR  https://www.cdc.gov/steadi/patient.html

## 2022-10-06 NOTE — ASU PATIENT PROFILE, ADULT - HAVE YOU HAD A FIRST COVID-19 BOOSTER?
What Type Of Note Output Would You Prefer (Optional)?: Standard Output Hpi Title: Evaluation of Skin Lesions How Severe Are Your Spot(S)?: mild Have Your Spot(S) Been Treated In The Past?: has not been treated Yes

## 2022-10-06 NOTE — ASU PATIENT PROFILE, ADULT - NSICDXPASTSURGICALHX_GEN_ALL_CORE_FT
PAST SURGICAL HISTORY:  History of knee replacement 9/2020- right knee replacment    History of laparoscopy exploratory, 2010    S/P ablation of ventricular arrhythmia 2008    S/P hemorrhoidectomy 2014    S/P knee surgery bilateral 1984, 1991, 2007, 2008, 2013    S/P LASIK surgery 2013    S/P shoulder surgery right 1995, left 1999    S/P tonsillectomy and adenoidectomy 1973    Status post total left knee replacement 2014

## 2022-10-10 LAB — SURGICAL PATHOLOGY STUDY: SIGNIFICANT CHANGE UP

## 2022-10-10 NOTE — H&P PST ADULT - NS SC CAGE ALCOHOL CUT DOWN
This patient was referred by Dr. Dee Dee Calero for an evaluation of colonoscopy.  A copy of this will be sent to the referring provider. No prior colonoscopy. He has insulin dependent diabetes, control is variable depending on if he is traveling.  Smokes cigars 2x daily for the past 5 years. He has regular bowel movements. No blood in stool. No FH of CRC. no

## 2022-10-11 ENCOUNTER — NON-APPOINTMENT (OUTPATIENT)
Age: 52
End: 2022-10-11

## 2022-10-11 DIAGNOSIS — Z88.5 ALLERGY STATUS TO NARCOTIC AGENT: ICD-10-CM

## 2022-10-11 DIAGNOSIS — Z96.653 PRESENCE OF ARTIFICIAL KNEE JOINT, BILATERAL: ICD-10-CM

## 2022-10-11 DIAGNOSIS — K21.9 GASTRO-ESOPHAGEAL REFLUX DISEASE WITHOUT ESOPHAGITIS: ICD-10-CM

## 2022-10-11 DIAGNOSIS — K31.89 OTHER DISEASES OF STOMACH AND DUODENUM: ICD-10-CM

## 2022-10-11 DIAGNOSIS — I34.0 NONRHEUMATIC MITRAL (VALVE) INSUFFICIENCY: ICD-10-CM

## 2022-10-11 DIAGNOSIS — Z88.8 ALLERGY STATUS TO OTHER DRUGS, MEDICAMENTS AND BIOLOGICAL SUBSTANCES STATUS: ICD-10-CM

## 2022-10-11 DIAGNOSIS — Z86.718 PERSONAL HISTORY OF OTHER VENOUS THROMBOSIS AND EMBOLISM: ICD-10-CM

## 2022-10-11 DIAGNOSIS — N40.0 BENIGN PROSTATIC HYPERPLASIA WITHOUT LOWER URINARY TRACT SYMPTOMS: ICD-10-CM

## 2022-10-11 NOTE — ED STATDOCS - CARDIAC, MLM
Chronic, stable.  132/84 in clinic.   He was started on Lisinopril 5mg for BP in 140s but has been off of this for 2.5 years.   Patient will monitor his blood pressure at home and send me readings via [employer].     normal rate, regular rhythm, and no murmur.

## 2022-10-13 ENCOUNTER — RX RENEWAL (OUTPATIENT)
Age: 52
End: 2022-10-13
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
## 2022-10-21 NOTE — PHARMACOTHERAPY INTERVENTION NOTE - COMMENTS
Admission medication reconciliation POD1
Transition of Care video discharge education - medication calendar given to patient. Pharmacy fill confirmed.
36.8

## 2022-10-31 ENCOUNTER — FORM ENCOUNTER (OUTPATIENT)
Age: 52
End: 2022-10-31

## 2022-11-04 PROBLEM — Z87.898 PERSONAL HISTORY OF OTHER SPECIFIED CONDITIONS: Chronic | Status: ACTIVE | Noted: 2022-10-05

## 2022-11-04 PROBLEM — Z87.19 PERSONAL HISTORY OF OTHER DISEASES OF THE DIGESTIVE SYSTEM: Chronic | Status: ACTIVE | Noted: 2022-10-05

## 2022-11-15 ENCOUNTER — APPOINTMENT (OUTPATIENT)
Dept: FAMILY MEDICINE | Facility: CLINIC | Age: 52
End: 2022-11-15

## 2022-11-15 ENCOUNTER — FORM ENCOUNTER (OUTPATIENT)
Age: 52
End: 2022-11-15

## 2022-11-15 PROCEDURE — 90750 HZV VACC RECOMBINANT IM: CPT

## 2022-11-15 PROCEDURE — 90471 IMMUNIZATION ADMIN: CPT

## 2022-11-16 ENCOUNTER — OFFICE (OUTPATIENT)
Dept: URBAN - METROPOLITAN AREA CLINIC 12 | Facility: CLINIC | Age: 52
Setting detail: OPHTHALMOLOGY
End: 2022-11-16
Payer: COMMERCIAL

## 2022-11-16 DIAGNOSIS — G45.3: ICD-10-CM

## 2022-11-16 DIAGNOSIS — H01.004: ICD-10-CM

## 2022-11-16 DIAGNOSIS — H01.001: ICD-10-CM

## 2022-11-16 DIAGNOSIS — H25.13: ICD-10-CM

## 2022-11-16 DIAGNOSIS — H90.3: ICD-10-CM

## 2022-11-16 PROCEDURE — 92014 COMPRE OPH EXAM EST PT 1/>: CPT | Performed by: OPHTHALMOLOGY

## 2022-11-16 PROCEDURE — 92567 TYMPANOMETRY: CPT

## 2022-11-16 PROCEDURE — 92557 COMPREHENSIVE HEARING TEST: CPT

## 2022-11-16 ASSESSMENT — REFRACTION_CURRENTRX
OD_OVR_VA: 20/
OD_VPRISM_DIRECTION: SV
OS_VPRISM_DIRECTION: SV
OS_OVR_VA: 20/
OD_OVR_VA: 20/
OD_ADD: +1.50
OS_OVR_VA: 20/
OS_ADD: +1.50

## 2022-11-16 ASSESSMENT — REFRACTION_AUTOREFRACTION
OD_AXIS: 117
OD_CYLINDER: -0.50
OS_SPHERE: +0.50
OD_SPHERE: +0.75
OS_AXIS: 067
OS_CYLINDER: -0.25

## 2022-11-16 ASSESSMENT — REFRACTION_MANIFEST
OS_CYLINDER: -0.25
OD_CYLINDER: -0.50
OS_VA1: 20/15-1
OS_SPHERE: +0.50
OD_AXIS: 120
OD_VA1: 20/15
OD_SPHERE: +0.75
OU_VA: 20/20
OS_AXIS: 070

## 2022-11-16 ASSESSMENT — AXIALLENGTH_DERIVED
OD_AL: 23.3987
OS_AL: 23.4014
OS_AL: 23.4014
OD_AL: 23.3987

## 2022-11-16 ASSESSMENT — KERATOMETRY
OD_AXISANGLE_DEGREES: 087
METHOD_AUTO_MANUAL: AUTO
OS_AXISANGLE_DEGREES: 088
OS_K2POWER_DIOPTERS: 44.00
OD_K2POWER_DIOPTERS: 43.75
OD_K1POWER_DIOPTERS: 43.25
OS_K1POWER_DIOPTERS: 43.25

## 2022-11-16 ASSESSMENT — CONFRONTATIONAL VISUAL FIELD TEST (CVF)
OS_FINDINGS: FULL
OD_FINDINGS: FULL

## 2022-11-16 ASSESSMENT — VISUAL ACUITY
OD_BCVA: 20/20
OS_BCVA: 20/20-1

## 2022-11-16 ASSESSMENT — LID EXAM ASSESSMENTS
OD_BLEPHARITIS: T
OS_BLEPHARITIS: T

## 2022-11-16 ASSESSMENT — TONOMETRY
OS_IOP_MMHG: 17
OD_IOP_MMHG: 17

## 2022-11-16 ASSESSMENT — SPHEQUIV_DERIVED
OS_SPHEQUIV: 0.375
OD_SPHEQUIV: 0.5
OS_SPHEQUIV: 0.375
OD_SPHEQUIV: 0.5

## 2022-11-22 ENCOUNTER — NON-APPOINTMENT (OUTPATIENT)
Age: 52
End: 2022-11-22

## 2022-11-29 ENCOUNTER — FORM ENCOUNTER (OUTPATIENT)
Age: 52
End: 2022-11-29

## 2022-11-30 ENCOUNTER — OUTPATIENT (OUTPATIENT)
Dept: OUTPATIENT SERVICES | Facility: HOSPITAL | Age: 52
LOS: 1 days | End: 2022-11-30
Payer: COMMERCIAL

## 2022-11-30 VITALS
SYSTOLIC BLOOD PRESSURE: 135 MMHG | WEIGHT: 217.82 LBS | OXYGEN SATURATION: 97 % | DIASTOLIC BLOOD PRESSURE: 91 MMHG | HEIGHT: 70 IN | RESPIRATION RATE: 16 BRPM | TEMPERATURE: 98 F | HEART RATE: 68 BPM

## 2022-11-30 DIAGNOSIS — K57.92 DIVERTICULITIS OF INTESTINE, PART UNSPECIFIED, WITHOUT PERFORATION OR ABSCESS WITHOUT BLEEDING: ICD-10-CM

## 2022-11-30 DIAGNOSIS — K43.9 VENTRAL HERNIA WITHOUT OBSTRUCTION OR GANGRENE: ICD-10-CM

## 2022-11-30 DIAGNOSIS — Z98.89 OTHER SPECIFIED POSTPROCEDURAL STATES: Chronic | ICD-10-CM

## 2022-11-30 DIAGNOSIS — Z01.818 ENCOUNTER FOR OTHER PREPROCEDURAL EXAMINATION: ICD-10-CM

## 2022-11-30 DIAGNOSIS — Z87.438 PERSONAL HISTORY OF OTHER DISEASES OF MALE GENITAL ORGANS: ICD-10-CM

## 2022-11-30 DIAGNOSIS — Z96.659 PRESENCE OF UNSPECIFIED ARTIFICIAL KNEE JOINT: Chronic | ICD-10-CM

## 2022-11-30 DIAGNOSIS — Z96.652 PRESENCE OF LEFT ARTIFICIAL KNEE JOINT: Chronic | ICD-10-CM

## 2022-11-30 DIAGNOSIS — I10 ESSENTIAL (PRIMARY) HYPERTENSION: ICD-10-CM

## 2022-11-30 LAB
ANION GAP SERPL CALC-SCNC: 9 MMOL/L — SIGNIFICANT CHANGE UP (ref 5–17)
BLD GP AB SCN SERPL QL: SIGNIFICANT CHANGE UP
BUN SERPL-MCNC: 13 MG/DL — SIGNIFICANT CHANGE UP (ref 7–23)
CALCIUM SERPL-MCNC: 9.3 MG/DL — SIGNIFICANT CHANGE UP (ref 8.4–10.5)
CHLORIDE SERPL-SCNC: 103 MMOL/L — SIGNIFICANT CHANGE UP (ref 96–108)
CO2 SERPL-SCNC: 28 MMOL/L — SIGNIFICANT CHANGE UP (ref 22–31)
CREAT SERPL-MCNC: 0.97 MG/DL — SIGNIFICANT CHANGE UP (ref 0.5–1.3)
EGFR: 93 ML/MIN/1.73M2 — SIGNIFICANT CHANGE UP
GLUCOSE SERPL-MCNC: 101 MG/DL — HIGH (ref 70–99)
HCT VFR BLD CALC: 50.2 % — HIGH (ref 39–50)
HGB BLD-MCNC: 17.3 G/DL — HIGH (ref 13–17)
MCHC RBC-ENTMCNC: 31.8 PG — SIGNIFICANT CHANGE UP (ref 27–34)
MCHC RBC-ENTMCNC: 34.5 GM/DL — SIGNIFICANT CHANGE UP (ref 32–36)
MCV RBC AUTO: 92.3 FL — SIGNIFICANT CHANGE UP (ref 80–100)
NRBC # BLD: 0 /100 WBCS — SIGNIFICANT CHANGE UP (ref 0–0)
PLATELET # BLD AUTO: 187 K/UL — SIGNIFICANT CHANGE UP (ref 150–400)
POTASSIUM SERPL-MCNC: 4.2 MMOL/L — SIGNIFICANT CHANGE UP (ref 3.5–5.3)
POTASSIUM SERPL-SCNC: 4.2 MMOL/L — SIGNIFICANT CHANGE UP (ref 3.5–5.3)
RBC # BLD: 5.44 M/UL — SIGNIFICANT CHANGE UP (ref 4.2–5.8)
RBC # FLD: 12.3 % — SIGNIFICANT CHANGE UP (ref 10.3–14.5)
SODIUM SERPL-SCNC: 140 MMOL/L — SIGNIFICANT CHANGE UP (ref 135–145)
WBC # BLD: 5.83 K/UL — SIGNIFICANT CHANGE UP (ref 3.8–10.5)
WBC # FLD AUTO: 5.83 K/UL — SIGNIFICANT CHANGE UP (ref 3.8–10.5)

## 2022-11-30 NOTE — H&P PST ADULT - PROBLEM SELECTOR PLAN 2
pt instructed to continue medications as prescribed and take atenolol with a sip of water on the morning of the surgery

## 2022-11-30 NOTE — H&P PST ADULT - HISTORY OF PRESENT ILLNESS
52 y.o. male presents to [facility] with preop diagnosis of ventral hernia without obstruction or gangrene, reports has umbilical and ventral hernia > 1 year, denies hernia increasing in size, c/o intermittent discomfort with movement, scheduled for robotic assisted ventral hernia repair umbilicus with mesh and robotic epigastric hernia repair with mesh

## 2022-11-30 NOTE — H&P PST ADULT - GASTROINTESTINAL COMMENTS
ventral hernia, umbilical hernia, see HPI; reports "I have diverticulitis, started on amoxicillin for 10 days on 11/28/22" ventral hernia ventral hernia, umbilical hernia

## 2022-11-30 NOTE — H&P PST ADULT - NSICDXPASTMEDICALHX_GEN_ALL_CORE_FT
PAST MEDICAL HISTORY:  BPH with elevated PSA     Class 1 obesity with body mass index (BMI) of 31.0 to 31.9 in adult     Diverticulitis     GERD (gastroesophageal reflux disease)     H/O hemorrhoids     History of dyspnea     History of IBS     Lumbar disc herniation L4-5, asymptomatic    NSVT (nonsustained ventricular tachycardia)     Osteoarthritis of right knee     Palpitations     Seasonal allergies     Sleep apnea     Ventral hernia

## 2022-11-30 NOTE — H&P PST ADULT - DOCUMENT STATUS
Doing well.  No complaints.  Reports fetal movement.  Denies contractions, leakage of fluid, vaginal bleeding.  Desires IOL next week.  Will check in regarding scheduling.  All questions answered.  RTC in 1 week or sooner if needed.    
2+ glucose, +2 leuk in urine.     
Authored by Resident/PA/NP

## 2022-11-30 NOTE — H&P PST ADULT - PROBLEM SELECTOR PLAN 1
pt scheduled for robotic assisted ventral hernia repair umbilicus with mesh and robotic epigastric hernia repair with mesh on 12/14/22  Preop instructions provided. Pt verbalized understanding.    written and verbal instructions with teach back on chlorhexidine shampoo provided,  pt verbalized understanding with returned demonstration   pt states COVID test scheduled on 12/12/22 at [place] pt scheduled for robotic assisted ventral hernia repair umbilicus with mesh and robotic epigastric hernia repair with mesh on 12/14/22  Preop instructions provided. Pt verbalized understanding.    written and verbal instructions with teach back on chlorhexidine shampoo provided,  pt verbalized understanding with returned demonstration   pt states COVID test scheduled on 12/12/22 at [facility] eval scheduled on 12/1/22, copy requested pt scheduled for robotic assisted ventral hernia repair umbilicus with mesh and robotic epigastric hernia repair with mesh on 12/14/22  Preop instructions provided. Pt verbalized understanding.    written and verbal instructions with teach back on chlorhexidine shampoo provided,  pt verbalized understanding with returned demonstration   pt states COVID test scheduled on 12/13/22 at [facility]  med eval scheduled on 12/1/22, copy requested pt scheduled for robotic assisted ventral hernia repair umbilicus with mesh and robotic epigastric hernia repair with mesh on 12/14/22  Preop instructions provided. Pt verbalized understanding.    written and verbal instructions with teach back on chlorhexidine shampoo provided,  pt verbalized understanding with returned demonstration   pt states COVID test scheduled on 12/13/22 at [facility]  med eval scheduled on 12/1/22, copy requested    h/o ADOLFO, does not use CPAP, ADOLFO precautions pt scheduled for robotic assisted ventral hernia repair umbilicus with mesh and robotic epigastric hernia repair with mesh on 12/14/22  Preop instructions provided. Pt verbalized understanding.    written and verbal instructions with teach back on chlorhexidine shampoo provided,  pt verbalized understanding with returned demonstration   pt states COVID test scheduled on 12/13/22 at [facility]  med eval scheduled on 12/1/22, copy requested    h/o ADOLFO, does not use CPAP, OR booking notified

## 2022-12-01 ENCOUNTER — APPOINTMENT (OUTPATIENT)
Dept: FAMILY MEDICINE | Facility: CLINIC | Age: 52
End: 2022-12-01

## 2022-12-01 ENCOUNTER — NON-APPOINTMENT (OUTPATIENT)
Age: 52
End: 2022-12-01

## 2022-12-01 VITALS
SYSTOLIC BLOOD PRESSURE: 138 MMHG | OXYGEN SATURATION: 97 % | DIASTOLIC BLOOD PRESSURE: 78 MMHG | TEMPERATURE: 97.1 F | HEART RATE: 68 BPM

## 2022-12-01 VITALS — DIASTOLIC BLOOD PRESSURE: 78 MMHG | SYSTOLIC BLOOD PRESSURE: 130 MMHG

## 2022-12-01 DIAGNOSIS — Z01.818 ENCOUNTER FOR OTHER PREPROCEDURAL EXAMINATION: ICD-10-CM

## 2022-12-01 DIAGNOSIS — K42.9 UMBILICAL HERNIA W/OUT OBSTRUCTION OR GANGRENE: ICD-10-CM

## 2022-12-01 PROCEDURE — 99213 OFFICE O/P EST LOW 20 MIN: CPT | Mod: 25

## 2022-12-01 PROCEDURE — 93000 ELECTROCARDIOGRAM COMPLETE: CPT

## 2022-12-01 RX ORDER — CLOBETASOL PROPIONATE 0.5 MG/G
0.05 CREAM TOPICAL
Qty: 30 | Refills: 0 | Status: DISCONTINUED | COMMUNITY
Start: 2021-05-25 | End: 2022-12-01

## 2022-12-02 NOTE — PHYSICAL EXAM
[No Edema] : there was no peripheral edema [Normal] : no rash [Coordination Grossly Intact] : coordination grossly intact [No Focal Deficits] : no focal deficits [Normal Gait] : normal gait [Normal Affect] : the affect was normal [Normal Insight/Judgement] : insight and judgment were intact [de-identified] : reducible umbilical and epigastric abdominal wall hernia

## 2022-12-02 NOTE — HISTORY OF PRESENT ILLNESS
[No Pertinent Cardiac History] : no history of aortic stenosis, atrial fibrillation, coronary artery disease, recent myocardial infarction, or implantable device/pacemaker [No Pertinent Pulmonary History] : no history of asthma, COPD, sleep apnea, or smoking [No Adverse Anesthesia Reaction] : no adverse anesthesia reaction in self or family member [(Patient denies any chest pain, claudication, dyspnea on exertion, orthopnea, palpitations or syncope)] : Patient denies any chest pain, claudication, dyspnea on exertion, orthopnea, palpitations or syncope [Aortic Stenosis] : no aortic stenosis [Atrial Fibrillation] : no atrial fibrillation [Coronary Artery Disease] : no coronary artery disease [Recent Myocardial Infarction] : no recent myocardial infarction [Implantable Device/Pacemaker] : no implantable device/pacemaker [Chronic Anticoagulation] : no chronic anticoagulation [Chronic Kidney Disease] : no chronic kidney disease [Diabetes] : no diabetes [FreeTextEntry1] : Robotic Ventral Umbilical Hernia and Epigastric Hernia Repair with Mesh. [FreeTextEntry2] : 12/14/2022 [FreeTextEntry3] : Dr. Levy [FreeTextEntry4] : Patient is a 53yo male with PMH OA, mitral regurgitation, PVCs who presents to the office for presurgical clearance.  Patient is scheduled for robotic ventral umbilical hernia and epigastric hernia with mesh on 12/14/2022 with Dr. Levy at [facility].  Pt had PST prior on 11/30/2022. \par \par Cardiology:  Dr. Castellon.\par \par Pt is currently on Augmentin for acute diverticulitis, prescribed by GI Dr. Mars.  Had CT on Monday but has not yet received results.  Pt is feeling significantly improved since starting Augmentin.  Pt denies any fevers.  Denies further diarrhea.

## 2022-12-02 NOTE — REVIEW OF SYSTEMS
[Negative] : Heme/Lymph [FreeTextEntry7] : currently being treated for acute diverticulitis; umbilical and epigastric abdominal wall hernia

## 2022-12-02 NOTE — ASSESSMENT
[FreeTextEntry4] : Patient is a 51yo male with PMH OA, mitral regurgitation, PVCs who presents to the office for presurgical clearance. Patient is scheduled for robotic ventral umbilical hernia and epigastric hernia with mesh on 12/14/2022 with Dr. Levy at [facility]. Pt had PST prior on 11/30/2022. \par \par B/w reviewed, H/H slightly elevated.  Pt admits he has been drinking water since diverticulitis diagnosis but maybe not enough.\par WBC WNL.\par Borderline elevated non-fasting glucose otherwise CMP WNL.\par \par EKG performed in office NSR, no acute ST/T changes, no arrhythmias, no significant change when compared to prior in chart from 06/2022.\par \par Pt currently on Augmentin for acute diverticulitis and has resolution of symptoms.\par Pt has appointment scheduled with Dr. Levy tomorrow, will discuss recent diagnosis with him to ensure surgery can still proceed as scheduled.\par Pt will be done with diverticulitis treatment prior to proposed procedure.\par \par Pt optimized for proposed procedure.  Pt should complete Augmentin as prescribed and continue to hydrate well.

## 2022-12-02 NOTE — ADDENDUM
[FreeTextEntry1] : Agree with above. Patient is currently being treated for diverticulitis with Augmentin. He is already feeling better on the antibiotic and will complete this before his scheduled surgery date of 12/16. Based upon his exam and test results, he is medically optimized for surgery.\par \par Cullen Humphrey M.D.\par

## 2022-12-13 ENCOUNTER — TRANSCRIPTION ENCOUNTER (OUTPATIENT)
Age: 52
End: 2022-12-13

## 2022-12-13 ENCOUNTER — OUTPATIENT (OUTPATIENT)
Dept: OUTPATIENT SERVICES | Facility: HOSPITAL | Age: 52
LOS: 1 days | End: 2022-12-13
Payer: COMMERCIAL

## 2022-12-13 VITALS
SYSTOLIC BLOOD PRESSURE: 122 MMHG | RESPIRATION RATE: 14 BRPM | HEIGHT: 70 IN | OXYGEN SATURATION: 97 % | TEMPERATURE: 98 F | HEART RATE: 66 BPM | DIASTOLIC BLOOD PRESSURE: 81 MMHG | WEIGHT: 217.82 LBS

## 2022-12-13 DIAGNOSIS — Z96.659 PRESENCE OF UNSPECIFIED ARTIFICIAL KNEE JOINT: Chronic | ICD-10-CM

## 2022-12-13 DIAGNOSIS — Z98.89 OTHER SPECIFIED POSTPROCEDURAL STATES: Chronic | ICD-10-CM

## 2022-12-13 DIAGNOSIS — Z20.828 CONTACT WITH AND (SUSPECTED) EXPOSURE TO OTHER VIRAL COMMUNICABLE DISEASES: ICD-10-CM

## 2022-12-13 DIAGNOSIS — Z96.652 PRESENCE OF LEFT ARTIFICIAL KNEE JOINT: Chronic | ICD-10-CM

## 2022-12-13 PROBLEM — K43.9 VENTRAL HERNIA WITHOUT OBSTRUCTION OR GANGRENE: Chronic | Status: ACTIVE | Noted: 2022-11-30

## 2022-12-13 PROBLEM — K57.92 DIVERTICULITIS OF INTESTINE, PART UNSPECIFIED, WITHOUT PERFORATION OR ABSCESS WITHOUT BLEEDING: Chronic | Status: ACTIVE | Noted: 2022-11-30

## 2022-12-13 LAB — SARS-COV-2 RNA SPEC QL NAA+PROBE: SIGNIFICANT CHANGE UP

## 2022-12-13 PROCEDURE — U0005: CPT

## 2022-12-13 PROCEDURE — 85027 COMPLETE CBC AUTOMATED: CPT

## 2022-12-13 PROCEDURE — 86900 BLOOD TYPING SEROLOGIC ABO: CPT

## 2022-12-13 PROCEDURE — U0003: CPT

## 2022-12-13 PROCEDURE — 86850 RBC ANTIBODY SCREEN: CPT

## 2022-12-13 PROCEDURE — G0463: CPT

## 2022-12-13 PROCEDURE — 86901 BLOOD TYPING SEROLOGIC RH(D): CPT

## 2022-12-13 PROCEDURE — 80048 BASIC METABOLIC PNL TOTAL CA: CPT

## 2022-12-13 PROCEDURE — 36415 COLL VENOUS BLD VENIPUNCTURE: CPT

## 2022-12-13 PROCEDURE — 87635 SARS-COV-2 COVID-19 AMP PRB: CPT

## 2022-12-14 ENCOUNTER — TRANSCRIPTION ENCOUNTER (OUTPATIENT)
Age: 52
End: 2022-12-14

## 2022-12-14 ENCOUNTER — OUTPATIENT (OUTPATIENT)
Dept: OUTPATIENT SERVICES | Facility: HOSPITAL | Age: 52
LOS: 1 days | End: 2022-12-14
Payer: COMMERCIAL

## 2022-12-14 VITALS
DIASTOLIC BLOOD PRESSURE: 73 MMHG | SYSTOLIC BLOOD PRESSURE: 113 MMHG | TEMPERATURE: 98 F | RESPIRATION RATE: 16 BRPM | HEART RATE: 78 BPM | OXYGEN SATURATION: 97 %

## 2022-12-14 DIAGNOSIS — Z96.659 PRESENCE OF UNSPECIFIED ARTIFICIAL KNEE JOINT: Chronic | ICD-10-CM

## 2022-12-14 DIAGNOSIS — Z98.89 OTHER SPECIFIED POSTPROCEDURAL STATES: Chronic | ICD-10-CM

## 2022-12-14 DIAGNOSIS — K43.9 VENTRAL HERNIA WITHOUT OBSTRUCTION OR GANGRENE: ICD-10-CM

## 2022-12-14 DIAGNOSIS — Z96.652 PRESENCE OF LEFT ARTIFICIAL KNEE JOINT: Chronic | ICD-10-CM

## 2022-12-14 PROCEDURE — C1781: CPT

## 2022-12-14 PROCEDURE — S2900: CPT

## 2022-12-14 PROCEDURE — C9399: CPT

## 2022-12-14 PROCEDURE — 49652: CPT

## 2022-12-14 DEVICE — MESH HERNIA INGUINAL PARIETEX PROGRIP RECTANGLE 15 X 9CM: Type: IMPLANTABLE DEVICE | Status: FUNCTIONAL

## 2022-12-14 RX ORDER — ATENOLOL 25 MG/1
12.5 TABLET ORAL
Qty: 0 | Refills: 0 | DISCHARGE

## 2022-12-14 RX ORDER — AMOXICILLIN 250 MG/5ML
125 SUSPENSION, RECONSTITUTED, ORAL (ML) ORAL
Qty: 0 | Refills: 0 | DISCHARGE

## 2022-12-14 RX ORDER — ACETAMINOPHEN 500 MG
2 TABLET ORAL
Qty: 0 | Refills: 0 | DISCHARGE

## 2022-12-14 RX ORDER — SILODOSIN 4 MG/1
1 CAPSULE ORAL
Qty: 0 | Refills: 0 | DISCHARGE

## 2022-12-14 RX ORDER — PEPPERMINT OIL 90 MG
2 CAPSULE, DELAYED, AND EXTENDED RELEASE ORAL
Qty: 0 | Refills: 0 | DISCHARGE

## 2022-12-14 RX ORDER — SODIUM CHLORIDE 9 MG/ML
1000 INJECTION, SOLUTION INTRAVENOUS
Refills: 0 | Status: DISCONTINUED | OUTPATIENT
Start: 2022-12-14 | End: 2022-12-14

## 2022-12-14 RX ORDER — OXYCODONE HYDROCHLORIDE 5 MG/1
5 TABLET ORAL ONCE
Refills: 0 | Status: DISCONTINUED | OUTPATIENT
Start: 2022-12-14 | End: 2022-12-14

## 2022-12-14 RX ORDER — ALUMINUM ZIRCONIUM TRICHLOROHYDREX GLY 0.2 G/G
1 STICK TOPICAL
Qty: 0 | Refills: 0 | DISCHARGE

## 2022-12-14 RX ORDER — ONDANSETRON 8 MG/1
4 TABLET, FILM COATED ORAL ONCE
Refills: 0 | Status: DISCONTINUED | OUTPATIENT
Start: 2022-12-14 | End: 2022-12-14

## 2022-12-14 RX ORDER — HYDROMORPHONE HYDROCHLORIDE 2 MG/ML
0.5 INJECTION INTRAMUSCULAR; INTRAVENOUS; SUBCUTANEOUS ONCE
Refills: 0 | Status: DISCONTINUED | OUTPATIENT
Start: 2022-12-14 | End: 2022-12-14

## 2022-12-14 RX ORDER — HYDROMORPHONE HYDROCHLORIDE 2 MG/ML
1 INJECTION INTRAMUSCULAR; INTRAVENOUS; SUBCUTANEOUS ONCE
Refills: 0 | Status: DISCONTINUED | OUTPATIENT
Start: 2022-12-14 | End: 2022-12-14

## 2022-12-14 RX ADMIN — SODIUM CHLORIDE 50 MILLILITER(S): 9 INJECTION, SOLUTION INTRAVENOUS at 06:58

## 2022-12-14 RX ADMIN — HYDROMORPHONE HYDROCHLORIDE 1 MILLIGRAM(S): 2 INJECTION INTRAMUSCULAR; INTRAVENOUS; SUBCUTANEOUS at 10:58

## 2022-12-14 NOTE — BRIEF OPERATIVE NOTE - NSICDXBRIEFPROCEDURE_GEN_ALL_CORE_FT
PROCEDURES:  Robot-assisted laparoscopic repair of umbilical hernia 14-Dec-2022 10:29:06 With mesh Marcella Sorto

## 2022-12-14 NOTE — ASU DISCHARGE PLAN (ADULT/PEDIATRIC) - CARE PROVIDER_API CALL
Wilner Levy)  Surgery; Surgical Critical Care  [address], [address]  [place] [address]  Phone: [phone]  Fax: [phone]  Follow Up Time:

## 2022-12-14 NOTE — ASU DISCHARGE PLAN (ADULT/PEDIATRIC) - NS MD DC FALL RISK RISK
For information on Fall & Injury Prevention, visit: [email or web address] OR  [email or web address] OR  https://www.cdc.gov/steadi/patient.html

## 2022-12-14 NOTE — ASU DISCHARGE PLAN (ADULT/PEDIATRIC) - ASU DC SPECIAL INSTRUCTIONSFT
Please call the office to schedule your appointment for follow up with Surgeon in 1-2 weeks.   Call MD sooner with any questions/concerns.     Take Tylenol 650-975mg every 6 hours alternating with Motrin 600mg every 6 hours for pain as needed. If pain not controlled with tylenol/motrin, please call your Doctor.     No heavy lifting or strenuous activities for 6 weeks  OK to shower in 24hrs, allow steri strips to fall off by themselves.    Please notify MD sooner or return to the ER with any new or worsening symptoms, uncontrollable pain, foul smelling discharge or drainage from wound, excessive bleeding or swelling, fever >101, chest pain, shortness of breath, abdominal pain, nausea/vomiting, or other concerns/problems.

## 2023-01-29 ENCOUNTER — FORM ENCOUNTER (OUTPATIENT)
Age: 53
End: 2023-01-29

## 2023-01-29 ENCOUNTER — NON-APPOINTMENT (OUTPATIENT)
Age: 53
End: 2023-01-29

## 2023-01-31 ENCOUNTER — FORM ENCOUNTER (OUTPATIENT)
Age: 53
End: 2023-01-31

## 2023-02-01 ENCOUNTER — OFFICE (OUTPATIENT)
Dept: URBAN - METROPOLITAN AREA CLINIC 12 | Facility: CLINIC | Age: 53
Setting detail: OPHTHALMOLOGY
End: 2023-02-01
Payer: COMMERCIAL

## 2023-02-01 DIAGNOSIS — H00.11: ICD-10-CM

## 2023-02-01 DIAGNOSIS — H52.13: ICD-10-CM

## 2023-02-01 DIAGNOSIS — H01.004: ICD-10-CM

## 2023-02-01 DIAGNOSIS — H01.001: ICD-10-CM

## 2023-02-01 DIAGNOSIS — H25.13: ICD-10-CM

## 2023-02-01 PROCEDURE — 99213 OFFICE O/P EST LOW 20 MIN: CPT | Performed by: OPHTHALMOLOGY

## 2023-02-01 ASSESSMENT — REFRACTION_MANIFEST
OD_CYLINDER: -0.50
OS_SPHERE: +0.50
OD_SPHERE: +0.75
OD_VA1: 20/15
OD_AXIS: 120
OU_VA: 20/20
OS_AXIS: 070
OS_VA1: 20/15-1
OS_CYLINDER: -0.25

## 2023-02-01 ASSESSMENT — REFRACTION_AUTOREFRACTION
OS_AXIS: 065
OD_CYLINDER: -0.75
OD_AXIS: 105
OS_SPHERE: +0.50
OS_CYLINDER: -0.25
OD_SPHERE: +0.50

## 2023-02-01 ASSESSMENT — KERATOMETRY
OS_AXISANGLE_DEGREES: 090
OD_AXISANGLE_DEGREES: 090
OS_K1POWER_DIOPTERS: 43.25
OS_K2POWER_DIOPTERS: 44.00
OD_K1POWER_DIOPTERS: 43.25
OD_K2POWER_DIOPTERS: 43.25
METHOD_AUTO_MANUAL: AUTO

## 2023-02-01 ASSESSMENT — SPHEQUIV_DERIVED
OS_SPHEQUIV: 0.375
OD_SPHEQUIV: 0.125
OD_SPHEQUIV: 0.5
OS_SPHEQUIV: 0.375

## 2023-02-01 ASSESSMENT — REFRACTION_CURRENTRX
OS_OVR_VA: 20/
OS_OVR_VA: 20/
OS_ADD: +1.50
OD_OVR_VA: 20/
OS_VPRISM_DIRECTION: SV
OD_OVR_VA: 20/
OD_ADD: +1.50
OD_VPRISM_DIRECTION: SV

## 2023-02-01 ASSESSMENT — LID EXAM ASSESSMENTS
OD_BLEPHARITIS: T
OS_BLEPHARITIS: T

## 2023-02-01 ASSESSMENT — AXIALLENGTH_DERIVED
OS_AL: 23.4014
OD_AL: 23.6351
OD_AL: 23.4894
OS_AL: 23.4014

## 2023-02-01 ASSESSMENT — TONOMETRY: OS_IOP_MMHG: 17

## 2023-02-01 ASSESSMENT — CONFRONTATIONAL VISUAL FIELD TEST (CVF)
OS_FINDINGS: FULL
OD_FINDINGS: FULL

## 2023-02-01 ASSESSMENT — VISUAL ACUITY
OD_BCVA: 20/20
OS_BCVA: 20/25-2

## 2023-02-05 ENCOUNTER — FORM ENCOUNTER (OUTPATIENT)
Age: 53
End: 2023-02-05

## 2023-02-06 ENCOUNTER — OFFICE (OUTPATIENT)
Dept: URBAN - METROPOLITAN AREA CLINIC 100 | Facility: CLINIC | Age: 53
Setting detail: OPHTHALMOLOGY
End: 2023-02-06
Payer: COMMERCIAL

## 2023-02-06 DIAGNOSIS — H01.005: ICD-10-CM

## 2023-02-06 DIAGNOSIS — H02.834: ICD-10-CM

## 2023-02-06 DIAGNOSIS — H02.831: ICD-10-CM

## 2023-02-06 DIAGNOSIS — H53.40: ICD-10-CM

## 2023-02-06 DIAGNOSIS — H00.11: ICD-10-CM

## 2023-02-06 DIAGNOSIS — H01.002: ICD-10-CM

## 2023-02-06 PROBLEM — H01.004 BLEPHARITIS; RIGHT UPPER LID, LEFT UPPER LID: Status: ACTIVE | Noted: 2023-02-06

## 2023-02-06 PROBLEM — H01.001 BLEPHARITIS; RIGHT UPPER LID, LEFT UPPER LID: Status: ACTIVE | Noted: 2023-02-06

## 2023-02-06 PROCEDURE — 92012 INTRM OPH EXAM EST PATIENT: CPT | Performed by: OPHTHALMOLOGY

## 2023-02-06 PROCEDURE — 92285 EXTERNAL OCULAR PHOTOGRAPHY: CPT | Performed by: OPHTHALMOLOGY

## 2023-02-06 ASSESSMENT — AXIALLENGTH_DERIVED
OS_AL: 23.4014
OD_AL: 23.6351

## 2023-02-06 ASSESSMENT — REFRACTION_AUTOREFRACTION
OD_AXIS: 105
OS_CYLINDER: -0.25
OD_SPHERE: +0.50
OS_AXIS: 065
OS_SPHERE: +0.50
OD_CYLINDER: -0.75

## 2023-02-06 ASSESSMENT — KERATOMETRY
OS_K2POWER_DIOPTERS: 44.00
OS_K1POWER_DIOPTERS: 43.25
OD_K2POWER_DIOPTERS: 43.25
OD_K1POWER_DIOPTERS: 43.25
OS_AXISANGLE_DEGREES: 090
METHOD_AUTO_MANUAL: AUTO
OD_AXISANGLE_DEGREES: 090

## 2023-02-06 ASSESSMENT — CONFRONTATIONAL VISUAL FIELD TEST (CVF)
OD_FINDINGS: FULL
OS_FINDINGS: FULL

## 2023-02-06 ASSESSMENT — LID EXAM ASSESSMENTS
OS_BLEPHARITIS: T
OD_BLEPHARITIS: T

## 2023-02-06 ASSESSMENT — SPHEQUIV_DERIVED
OD_SPHEQUIV: 0.125
OS_SPHEQUIV: 0.375

## 2023-02-06 ASSESSMENT — VISUAL ACUITY
OD_BCVA: 20/20
OS_BCVA: 20/20

## 2023-02-06 ASSESSMENT — LID POSITION - DERMATOCHALASIS
OD_DERMATOCHALASIS: RUL
OS_DERMATOCHALASIS: LUL

## 2023-03-01 ENCOUNTER — LABORATORY RESULT (OUTPATIENT)
Age: 53
End: 2023-03-01

## 2023-03-01 ENCOUNTER — APPOINTMENT (OUTPATIENT)
Dept: FAMILY MEDICINE | Facility: CLINIC | Age: 53
End: 2023-03-01
Payer: COMMERCIAL

## 2023-03-01 VITALS
TEMPERATURE: 97 F | HEIGHT: 70 IN | WEIGHT: 217 LBS | HEART RATE: 79 BPM | DIASTOLIC BLOOD PRESSURE: 78 MMHG | OXYGEN SATURATION: 96 % | BODY MASS INDEX: 31.07 KG/M2 | SYSTOLIC BLOOD PRESSURE: 108 MMHG

## 2023-03-01 DIAGNOSIS — R19.7 DIARRHEA, UNSPECIFIED: ICD-10-CM

## 2023-03-01 PROCEDURE — 99213 OFFICE O/P EST LOW 20 MIN: CPT | Mod: 25

## 2023-03-01 PROCEDURE — 36415 COLL VENOUS BLD VENIPUNCTURE: CPT

## 2023-03-01 NOTE — ASSESSMENT
[FreeTextEntry1] : Patient presents with diarrhea and gas pain which began after a trip to [place]. He also has some URI symptoms but the most bother some are the GI symptoms. He has a history of IBS which improved after he had hernia surgery. He had taken IBgard in the past per recommendation of gastroenterologist Dr. Mars and states that his symptoms greatly improved after.

## 2023-03-01 NOTE — HEALTH RISK ASSESSMENT
[0] : 2) Feeling down, depressed, or hopeless: Not at all (0) [PHQ-2 Negative - No further assessment needed] : PHQ-2 Negative - No further assessment needed [[id number]] : 0 [Never] : Never

## 2023-03-01 NOTE — PLAN
[FreeTextEntry1] : He will try IBgard again. Ordered blood work as above. Gave patient stool kit with appropriate directions if symptoms do not improve over the next couple of days. Will discuss medications and further steps based upon lab results.

## 2023-03-01 NOTE — HISTORY OF PRESENT ILLNESS
[FreeTextEntry8] : Patient presents with a sore throat. He had gone on a group tour trip to [facility] with his wife on 2/9 and was there for 10 days. Two days in, he had developed a scratchy throat and a runny nose; had taken NyQuil for his symptoms. Since he came back, he had started experiencing the following: headache, sharp gas pains, diarrhea, temp of 101, chills, and fatigue. He states that his fever has resolved and that his sinus symptoms have improved overall, however, he still has lingering diarrhea; has about 3 bowel movements a day and he does not believe it is food related. Treatment has included Imodium which he states helps temporarily. He adds that his wife has been dealing with a head cold and there were several people in the group tour that were positive for COVID and had GI symptoms. He has taken two at home COVID tests since returning to the [place]; both were negative.  \par \par

## 2023-03-01 NOTE — PHYSICAL EXAM
[Soft] : abdomen soft [Non Tender] : non-tender [Non-distended] : non-distended [Normal Bowel Sounds] : normal bowel sounds [No CVA Tenderness] : no CVA  tenderness [No Focal Deficits] : no focal deficits [Normal] : affect was normal and insight and judgment were intact [de-identified] : congested nasal mucosa

## 2023-03-02 LAB
ALBUMIN SERPL ELPH-MCNC: 4.5 G/DL
ALP BLD-CCNC: 35 U/L
ALT SERPL-CCNC: 42 U/L
ANION GAP SERPL CALC-SCNC: 12 MMOL/L
AST SERPL-CCNC: 54 U/L
BILIRUB SERPL-MCNC: 0.4 MG/DL
BUN SERPL-MCNC: 13 MG/DL
CALCIUM SERPL-MCNC: 9.8 MG/DL
CHLORIDE SERPL-SCNC: 102 MMOL/L
CO2 SERPL-SCNC: 27 MMOL/L
CREAT SERPL-MCNC: 0.88 MG/DL
DEPRECATED O AND P PREP STL: NORMAL
EGFR: 102 ML/MIN/1.73M2
GLUCOSE SERPL-MCNC: 87 MG/DL
POTASSIUM SERPL-SCNC: 4.7 MMOL/L
PROT SERPL-MCNC: 7.2 G/DL
SODIUM SERPL-SCNC: 141 MMOL/L

## 2023-03-03 LAB
BASOPHILS # BLD AUTO: 0 K/UL
BASOPHILS NFR BLD AUTO: 0 %
EOSINOPHIL # BLD AUTO: 0 K/UL
EOSINOPHIL NFR BLD AUTO: 0 %
HCT VFR BLD CALC: 47.3 %
HGB BLD-MCNC: 15.5 G/DL
LYMPHOCYTES # BLD AUTO: 2.17 K/UL
LYMPHOCYTES NFR BLD AUTO: 29.6 %
MAN DIFF?: NORMAL
MCHC RBC-ENTMCNC: 30.7 PG
MCHC RBC-ENTMCNC: 32.8 GM/DL
MCV RBC AUTO: 93.7 FL
MONOCYTES # BLD AUTO: 0.64 K/UL
MONOCYTES NFR BLD AUTO: 8.7 %
NEUTROPHILS # BLD AUTO: 4.08 K/UL
NEUTROPHILS NFR BLD AUTO: 55.6 %
PLATELET # BLD AUTO: 264 K/UL
RBC # BLD: 5.05 M/UL
RBC # FLD: 12.8 %
WBC # FLD AUTO: 7.34 K/UL

## 2023-03-04 LAB — BACTERIA STL CULT: NORMAL

## 2023-03-05 LAB
C DIFF TOXIN B QL PCR REFLEX: NORMAL
GDH ANTIGEN: NOT DETECTED
TOXIN A AND B: NOT DETECTED

## 2023-03-06 ENCOUNTER — NON-APPOINTMENT (OUTPATIENT)
Age: 53
End: 2023-03-06

## 2023-03-16 NOTE — ED ADULT NURSE NOTE - PRO INTERPRETER NEED 2
English Rhofade Counseling: Rhofade is a topical medication which can decrease superficial blood flow where applied. Side effects are uncommon and include stinging, redness and allergic reactions.

## 2023-03-23 ENCOUNTER — OFFICE (OUTPATIENT)
Dept: URBAN - METROPOLITAN AREA CLINIC 100 | Facility: CLINIC | Age: 53
Setting detail: OPHTHALMOLOGY
End: 2023-03-23
Payer: COMMERCIAL

## 2023-03-23 DIAGNOSIS — H02.831: ICD-10-CM

## 2023-03-23 DIAGNOSIS — H01.001: ICD-10-CM

## 2023-03-23 DIAGNOSIS — H01.004: ICD-10-CM

## 2023-03-23 DIAGNOSIS — H02.834: ICD-10-CM

## 2023-03-23 DIAGNOSIS — H00.11: ICD-10-CM

## 2023-03-23 DIAGNOSIS — H53.40: ICD-10-CM

## 2023-03-23 DIAGNOSIS — H00.12: ICD-10-CM

## 2023-03-23 PROCEDURE — 92285 EXTERNAL OCULAR PHOTOGRAPHY: CPT | Performed by: OPHTHALMOLOGY

## 2023-03-23 PROCEDURE — 92012 INTRM OPH EXAM EST PATIENT: CPT | Performed by: OPHTHALMOLOGY

## 2023-03-23 ASSESSMENT — KERATOMETRY
OS_AXISANGLE_DEGREES: 090
METHOD_AUTO_MANUAL: AUTO
OD_K2POWER_DIOPTERS: 43.25
OS_K1POWER_DIOPTERS: 43.25
OD_K1POWER_DIOPTERS: 43.25
OD_AXISANGLE_DEGREES: 090
OS_K2POWER_DIOPTERS: 44.00

## 2023-03-23 ASSESSMENT — REFRACTION_AUTOREFRACTION
OS_CYLINDER: -0.25
OS_SPHERE: +0.50
OS_AXIS: 065
OD_AXIS: 105
OD_CYLINDER: -0.75
OD_SPHERE: +0.50

## 2023-03-23 ASSESSMENT — LID POSITION - DERMATOCHALASIS
OS_DERMATOCHALASIS: LUL
OD_DERMATOCHALASIS: RUL

## 2023-03-23 ASSESSMENT — SPHEQUIV_DERIVED
OS_SPHEQUIV: 0.375
OD_SPHEQUIV: 0.125

## 2023-03-23 ASSESSMENT — AXIALLENGTH_DERIVED
OS_AL: 23.4014
OD_AL: 23.6351

## 2023-03-23 ASSESSMENT — LID EXAM ASSESSMENTS
OD_BLEPHARITIS: T
OS_BLEPHARITIS: T

## 2023-03-23 ASSESSMENT — VISUAL ACUITY
OS_BCVA: 20/20
OD_BCVA: 20/20

## 2023-03-23 ASSESSMENT — CONFRONTATIONAL VISUAL FIELD TEST (CVF)
OD_FINDINGS: FULL
OS_FINDINGS: FULL

## 2023-04-06 ENCOUNTER — OFFICE (OUTPATIENT)
Dept: URBAN - METROPOLITAN AREA CLINIC 100 | Facility: CLINIC | Age: 53
Setting detail: OPHTHALMOLOGY
End: 2023-04-06
Payer: COMMERCIAL

## 2023-04-06 DIAGNOSIS — H01.002: ICD-10-CM

## 2023-04-06 DIAGNOSIS — H02.831: ICD-10-CM

## 2023-04-06 DIAGNOSIS — H01.004: ICD-10-CM

## 2023-04-06 DIAGNOSIS — H00.12: ICD-10-CM

## 2023-04-06 DIAGNOSIS — H00.11: ICD-10-CM

## 2023-04-06 PROCEDURE — 11900 INJECT SKIN LESIONS </W 7: CPT | Performed by: OPHTHALMOLOGY

## 2023-04-06 PROCEDURE — 92012 INTRM OPH EXAM EST PATIENT: CPT | Performed by: OPHTHALMOLOGY

## 2023-04-06 ASSESSMENT — CONFRONTATIONAL VISUAL FIELD TEST (CVF)
OD_FINDINGS: FULL
OS_FINDINGS: FULL

## 2023-04-06 ASSESSMENT — SPHEQUIV_DERIVED
OS_SPHEQUIV: 0.375
OD_SPHEQUIV: 0.125

## 2023-04-06 ASSESSMENT — LID POSITION - DERMATOCHALASIS
OD_DERMATOCHALASIS: RUL
OS_DERMATOCHALASIS: LUL

## 2023-04-06 ASSESSMENT — VISUAL ACUITY
OS_BCVA: 20/20-
OD_BCVA: 20/20-2

## 2023-04-06 ASSESSMENT — KERATOMETRY
OD_K2POWER_DIOPTERS: 43.25
OS_K2POWER_DIOPTERS: 44.00
OS_AXISANGLE_DEGREES: 090
OD_K1POWER_DIOPTERS: 43.25
OD_AXISANGLE_DEGREES: 090
METHOD_AUTO_MANUAL: AUTO
OS_K1POWER_DIOPTERS: 43.25

## 2023-04-06 ASSESSMENT — REFRACTION_AUTOREFRACTION
OS_SPHERE: +0.50
OD_CYLINDER: -0.75
OD_SPHERE: +0.50
OS_AXIS: 065
OS_CYLINDER: -0.25
OD_AXIS: 105

## 2023-04-06 ASSESSMENT — LID EXAM ASSESSMENTS
OD_BLEPHARITIS: T
OS_BLEPHARITIS: T

## 2023-04-06 ASSESSMENT — AXIALLENGTH_DERIVED
OS_AL: 23.4014
OD_AL: 23.6351

## 2023-05-02 NOTE — H&P PST ADULT - NEGATIVE MUSCULOSKELETAL SYMPTOMS
[FreeTextEntry2] : Patient receiving psychotherapy to address symptoms of anxiety and depression related to family stressors [Integrative Therapy] : Integrative Therapy  [de-identified] : Patient presented to session on time.  Patient discussed her feelings about her 's housekeeping habits and their chronic nature.  Patient and this writer explored their marital dynamic and the possible meaning of why this dynamic has gone unchanged for many years.  This writer helped patient draw connections to family dynamics and her relationship with her mother.  This writer offered emotional support and session ended with patient emotionally stable and behaviorally in control. [Return in ____ week(s)] : Return in [unfilled] week(s) [FreeTextEntry1] : 1x/weekly psychotherapy and regular psychiatric treatment no neck pain/no back pain

## 2023-05-04 ENCOUNTER — OFFICE (OUTPATIENT)
Dept: URBAN - METROPOLITAN AREA CLINIC 100 | Facility: CLINIC | Age: 53
Setting detail: OPHTHALMOLOGY
End: 2023-05-04
Payer: COMMERCIAL

## 2023-05-04 DIAGNOSIS — H00.11: ICD-10-CM

## 2023-05-04 DIAGNOSIS — H01.001: ICD-10-CM

## 2023-05-04 DIAGNOSIS — H02.834: ICD-10-CM

## 2023-05-04 DIAGNOSIS — H01.004: ICD-10-CM

## 2023-05-04 DIAGNOSIS — H53.40: ICD-10-CM

## 2023-05-04 DIAGNOSIS — H02.831: ICD-10-CM

## 2023-05-04 DIAGNOSIS — H00.12: ICD-10-CM

## 2023-05-04 PROCEDURE — 99214 OFFICE O/P EST MOD 30 MIN: CPT | Performed by: OPHTHALMOLOGY

## 2023-05-04 PROCEDURE — 92081 LIMITED VISUAL FIELD XM: CPT | Performed by: OPHTHALMOLOGY

## 2023-05-04 PROCEDURE — 92285 EXTERNAL OCULAR PHOTOGRAPHY: CPT | Performed by: OPHTHALMOLOGY

## 2023-05-04 ASSESSMENT — REFRACTION_AUTOREFRACTION
OS_AXIS: 065
OD_CYLINDER: -0.75
OD_SPHERE: +0.50
OS_SPHERE: +0.50
OD_AXIS: 105
OS_CYLINDER: -0.25

## 2023-05-04 ASSESSMENT — AXIALLENGTH_DERIVED
OS_AL: 23.4014
OD_AL: 23.6351

## 2023-05-04 ASSESSMENT — LID EXAM ASSESSMENTS
OS_BLEPHARITIS: T
OD_BLEPHARITIS: T

## 2023-05-04 ASSESSMENT — VISUAL ACUITY
OS_BCVA: 20/20-2
OD_BCVA: 20/20

## 2023-05-04 ASSESSMENT — KERATOMETRY
METHOD_AUTO_MANUAL: AUTO
OS_AXISANGLE_DEGREES: 090
OS_K1POWER_DIOPTERS: 43.25
OS_K2POWER_DIOPTERS: 44.00
OD_K1POWER_DIOPTERS: 43.25
OD_K2POWER_DIOPTERS: 43.25
OD_AXISANGLE_DEGREES: 090

## 2023-05-04 ASSESSMENT — CONFRONTATIONAL VISUAL FIELD TEST (CVF)
OS_FINDINGS: FULL
OD_FINDINGS: FULL

## 2023-05-04 ASSESSMENT — SPHEQUIV_DERIVED
OD_SPHEQUIV: 0.125
OS_SPHEQUIV: 0.375

## 2023-05-04 ASSESSMENT — LID POSITION - DERMATOCHALASIS
OS_DERMATOCHALASIS: LUL
OD_DERMATOCHALASIS: RUL

## 2023-06-06 ENCOUNTER — APPOINTMENT (OUTPATIENT)
Dept: ELECTROPHYSIOLOGY | Facility: CLINIC | Age: 53
End: 2023-06-06
Payer: COMMERCIAL

## 2023-06-06 VITALS
OXYGEN SATURATION: 95 % | SYSTOLIC BLOOD PRESSURE: 139 MMHG | HEIGHT: 70 IN | HEART RATE: 75 BPM | BODY MASS INDEX: 31.5 KG/M2 | DIASTOLIC BLOOD PRESSURE: 83 MMHG | WEIGHT: 220 LBS

## 2023-06-06 PROCEDURE — 99213 OFFICE O/P EST LOW 20 MIN: CPT | Mod: 25

## 2023-06-06 PROCEDURE — 93000 ELECTROCARDIOGRAM COMPLETE: CPT

## 2023-06-06 RX ORDER — ATENOLOL 25 MG/1
25 TABLET ORAL
Qty: 90 | Refills: 3 | Status: DISCONTINUED | COMMUNITY
Start: 2020-08-07 | End: 2023-06-06

## 2023-06-06 RX ORDER — SILODOSIN 8 MG/1
8 CAPSULE ORAL DAILY
Refills: 0 | Status: DISCONTINUED | COMMUNITY
End: 2023-06-06

## 2023-06-06 RX ORDER — AMOXICILLIN AND CLAVULANATE POTASSIUM 875; 125 MG/1; MG/1
875-125 TABLET, COATED ORAL
Qty: 20 | Refills: 0 | Status: DISCONTINUED | COMMUNITY
Start: 2022-11-28 | End: 2023-06-06

## 2023-06-07 NOTE — PATIENT PROFILE ADULT - MONEY FOR FOOD
Diabetes Care Specialist Virtual Visit Note       The patient location is: home in [place]  The chief complaint leading to consultation is: Diabetes  Visit type: audiovisual  Total time spent with patient: 60 min   Each patient to whom he or she provides medical services by telemedicine is:  (1) informed of the relationship between the physician and patient and the respective role of any other health care provider with respect to management of the patient; and (2) notified that he or she may decline to receive medical services by telemedicine and may withdraw from such care at any time.     Diabetes Care Specialist Progress Note  Author: Diane Rice RD  Date: 6/7/2023    Program Intake  Reason for Diabetes Program Visit:: Initial Diabetes Assessment  Current diabetes risk level:: low  In the last 12 months, have you:: none    No results found for: LABA1C, HGBA1C    Clinical    Patient Health Rating  Compared to other people your age, how would you rate your health?: Good    Problem Review  Reviewed Problem List with Patient: no (see comments)  Active comorbidities affecting diabetes self-care.: no  Reviewed health maintenance: no (see comments)    Clinical Assessment  Current Diabetes Treatment: Diet  Have you ever experienced hypoglycemia (low blood sugar)?: no  Have you ever experienced hyperglycemia (high blood sugar)?: no    Medication Information  How do you obtain your medications?: Mail order  How many days a week do you miss your medications?: Never  Medication adherence impacting ability to self-manage diabetes?: No    Labs  Do you have regular lab work to monitor your medications?: Yes  Type of Regular Lab Work: CBC, BMP  Where do you get your labs drawn?: [facility]  Lab Compliance Barriers: No    Nutritional Status  Diet: Regular  Meal Plan 24 Hour Recall: Breakfast, Lunch, Dinner, Snack (beverages: water, coffee)  Meal Plan 24 Hour Recall - Breakfast: cereal or justice and eggs prior to DX, now  eating a piece of fruit with eggs with water and coffee with cinnamon with whole milk  Meal Plan 24 Hour Recall - Lunch: salad, apple and peanut butter, leftovers  Meal Plan 24 Hour Recall - Dinner: chicken and broccoli with water  Meal Plan 24 Hour Recall - Snack: fruit, nuts, yogurt  Change in appetite?: No  Dentation:: Intact  Recent Changes in Weight: No Recent Weight Change  Current nutritional status an area of need that is impacting patient's ability to self-manage diabetes?: No    Additional Social History    Support  Does anyone support you with your diabetes care?: yes  Who supports you?: significant other, family member  Who takes you to your medical appointments?: self  Does the current support meet the patient's needs?: Yes  Is Support an area impacting ability to self-manage diabetes?: No    Access to Mass Media & Technology  Does the patient have access to any of the following devices or technologies?: Smart phone, Internet Access, Home computer  Media or technology needs impacting ability to self-manage diabetes?: No    Cognitive/Behavioral Health  Alert and Oriented: Yes  Difficulty Thinking: No  Requires Prompting: No  Requires assistance for routine expression?: No  Cognitive or behavioral barriers impacting ability to self-manage diabetes?: No    Culture/Confucianist  Culture or Worship beliefs that may impact ability to access healthcare: No    Communication  Language preference: English  Hearing Problems: No  Vision Problems: No  Communication needs impacting ability to self-manage diabetes?: No    Health Literacy  Preferred Learning Method: Face to Face  How often do you need to have someone help you read instructions, pamphlets, or written material from your doctor or pharmacy?: Never  Health literacy needs impacting ability to self-manage diabetes?: No      Diabetes Self-Management Skills Assessment    Diabetes Disease Process/Treatment Options  Patient/caregiver able to state what happens  when someone has diabetes.: no  Patient/caregiver knows what type of diabetes they have.: yes  Diabetes Type : Gestational  Patient/caregiver able to identify at least three signs and symptoms of diabetes.: no  Patient able to identify at least three risk factors for diabetes.: no  Diabetes Disease Process/Treatment Options: Skills Assessment Completed: Yes  Assessment indicates:: Knowledge deficit, Instruction Needed  Area of need?: Yes    Nutrition/Healthy Eating  Challenges to healthy eating:: other (see comments)  Method of carbohydrate measurement:: plate method  Patient can identify foods that impact blood sugar.: yes  Patient-identified foods:: sweets, soda  Nutrition/Healthy Eating Skills Assessment Completed:: Yes  Assessment indicates:: Knowledge deficit, Instruction Needed  Area of need?: Yes    Physical Activity/Exercise  Patient's daily activity level:: lightly active  Patient formally exercises outside of work.: yes  Exercise Type: walking  Intensity: Low  Frequency: four or more times a week  Duration: 30 min  Patient can identify forms of physical activity.: yes  Stated forms of physical activity:: any movement performed by muscles that uses energy  Patient can identify reasons why exercise/physical activity is important in diabetes management.: no  Physical Activity/Exercise Skills Assessment Completed: : Yes  Assessment indicates:: Knowledge deficit, Instruction Needed  Area of need?: Yes    Medications  Area of need?: Yes    Home Blood Glucose Monitoring  Patient states that blood sugar is checked at home daily.: yes  Monitoring Method:: home glucometer  Home glucometer meter type:: bought a meter cash  When do you check your blood sugar?: Before breakfast, 2 hours after meal  When you check what is your typical blood sugar range? : fastin, PP Dinner:106  Blood glucose logs:: yes, encouraged to keep logs, encouraged to bring logs to provider visits  Blood glucose logs reviewed today?:  yes  Home Blood Glucose Monitoring Skills Assessment Completed: : Yes  Assessment indicates:: Knowledge deficit, Instruction Needed  Area of need?: Yes    Acute Complications  Patient is able to identify types of acute complications: No  Acute Complications Skills Assessment Completed: : Yes  Assessment indicates:: Knowledge deficit, Instruction Needed  Area of need?: Yes    Chronic Complications  Patient can identify major chronic complications of diabetes.: no  Patient can identify ways to prevent or delay diabetes complications.: yes  Stated ways to prevent complications:: controlling blood sugar  Patient is aware that having diabetes increases risk of heart disease?: No  Patient is aware that heart disease is the leading cause of death and disability in people with diabetes?: No  Patient able to state risk factors for heart disease?: No  Chronic Complications Skills Assessment Completed: : Yes  Assessment indicates:: Knowledge deficit, Instruction Needed  Area of need?: Yes    Psychosocial/Coping  Patient can identify ways of coping with chronic disease.: yes  Patient-stated ways of coping with chronic disease:: support from loved ones  Psychosocial/Coping Skills Assessment Completed: : Yes  Assessment indicates:: Adequate understanding  Area of need?: No      Diabetes Self Support Plan         Assessment Summary and Plan    Based on today's diabetes care assessment, the following areas of need were identified:      Social 6/7/2023   Support No   Access to Mass Media/Tech No   Cognitive/Behavioral Health No   Culture/Mandaeism No   Communication No   Health Literacy No        Clinical 6/7/2023   Medication Adherence No   Lab Compliance No   Nutritional Status No        Diabetes Self-Management Skills 6/7/2023   Diabetes Disease Process/Treatment Options Yes-ed on GDM risk factors   Nutrition/Healthy Eating Yes-see care plan   Physical Activity/Exercise Yes-ed on benefits of 20-30 mins of exercise 4-5 times per  week, ed on precautions to take with exercise   Medication Yes-reviewed medication options   Home Blood Glucose Monitoring Yes-see care plan   Acute Complications Yes-ed on causes, s/s and treatment for hyper and hypoglycemia   Chronic Complications Yes-ed on tight BG control to limit/prevent complications during and after pregnancy. Ed on Type 2 DM prevention.   Psychosocial/Coping No          Today's interventions were provided through individual discussion, instruction, and written materials were provided.      Patient verbalized understanding of instruction and written materials.  Pt was able to return back demonstration of instructions today. Patient understood key points, needs reinforcement and further instruction.     Diabetes Self-Management Care Plan:    Today's Diabetes Self-Management Care Plan was developed with Pinky's input. Pinky has agreed to work toward the following goal(s) to improve his/her overall diabetes control.      Care Plan: Diabetes Management   Updates made since 5/8/2023 12:00 AM        Problem: Healthy Eating         Goal: Eat 30-45g of carbs at breakfast and 45-60g of carbs at lunch and dinner    Start Date: 6/7/2023   Expected End Date: 7/10/2023   Priority: High   Barriers: No Barriers Identified        Task: Reviewed the sources and role of Carbohydrate, Protein, and Fat and how each nutrient impacts blood sugar. Completed 6/7/2023        Task: Provided visual examples using dry measuring cups, food models, and other familiar objects such as computer mouse, deck or cards, tennis ball etc. to help with visualization of portions. Completed 6/7/2023        Task: Explained how to count carbohydrates using the food label and the use of dry measuring cups for accurate carb counting. Completed 6/7/2023        Task: Discussed strategies for choosing healthier menu options when dining out. Completed 6/7/2023        Task: Recommended replacing beverages containing high sugar content with  noncaloric/sugar free options and/or water. Completed 6/7/2023        Task: Review the importance of balancing carbohydrates with each meal using portion control techniques to count servings of carbohydrate and label reading to identify serving size and amount of total carbs per serving. Completed 6/7/2023        Task: Provided Sample plate method and reviewed the use of the plate to estimate amounts of carbohydrate per meal. Completed 6/7/2023        Problem: Blood Glucose Self-Monitoring         Goal: Patient agrees to check and record blood sugars 4 times per day.    Start Date: 6/7/2023   Expected End Date: 7/10/2023   Priority: Medium   Barriers: Lack of Supplies        Task: Provided patient with a meter today and sent Rx request to provider to send to patients pharmacy.         Task: Reviewed the importance of self-monitoring blood glucose and keeping logs. Completed 6/7/2023        Task: Instructed on how to self-monitor blood glucose using a home glucometer, how to properly dispose of used strips and lancets after use, and how to appropriately store meter and supplies. Completed 6/7/2023        Task: Provided patient with blood glucose logs, reviewed appropriate timing and frequency to SMBG, education on parameters on when to notify provider and advised patient to bring logs to all appts with PCP/Endocrinologist/Diabetes Care Specialist. Completed 6/7/2023        Task: Discussed ways to minimize pain when monitoring blood glucose. Completed 6/7/2023          Follow Up Plan     No follow-ups on file.    Today's care plan and follow up schedule was discussed with patient.  Pinky verbalized understanding of the care plan, goals, and agrees to follow up plan.        The patient was encouraged to communicate with his/her health care provider/physician and care team regarding his/her condition(s) and treatment.  I provided the patient with my contact information today and encouraged to contact me via phone or  [facility] Patient Portal as needed.     Length of Visit   Total Time: 60 Minutes     no

## 2023-07-11 NOTE — CARDIOLOGY SUMMARY
[de-identified] : 6/7/22 : Sinus 2 PVCs.  [de-identified] : 9/11/2020, no Ischemia  [de-identified] : 3/27/18 LVEF 60-65%.

## 2023-07-24 ENCOUNTER — FORM ENCOUNTER (OUTPATIENT)
Age: 53
End: 2023-07-24

## 2023-07-24 ENCOUNTER — NON-APPOINTMENT (OUTPATIENT)
Age: 53
End: 2023-07-24

## 2023-08-03 ENCOUNTER — OFFICE (OUTPATIENT)
Dept: URBAN - METROPOLITAN AREA CLINIC 100 | Facility: CLINIC | Age: 53
Setting detail: OPHTHALMOLOGY
End: 2023-08-03
Payer: COMMERCIAL

## 2023-08-03 DIAGNOSIS — H01.004: ICD-10-CM

## 2023-08-03 DIAGNOSIS — H53.40: ICD-10-CM

## 2023-08-03 DIAGNOSIS — H02.831: ICD-10-CM

## 2023-08-03 DIAGNOSIS — H01.001: ICD-10-CM

## 2023-08-03 DIAGNOSIS — H02.834: ICD-10-CM

## 2023-08-03 PROCEDURE — 92081 LIMITED VISUAL FIELD XM: CPT | Performed by: OPHTHALMOLOGY

## 2023-08-03 PROCEDURE — 92012 INTRM OPH EXAM EST PATIENT: CPT | Performed by: OPHTHALMOLOGY

## 2023-08-03 ASSESSMENT — KERATOMETRY
OD_AXISANGLE_DEGREES: 090
OD_K2POWER_DIOPTERS: 43.25
OS_K2POWER_DIOPTERS: 44.00
OS_K1POWER_DIOPTERS: 43.25
OD_K1POWER_DIOPTERS: 43.25
METHOD_AUTO_MANUAL: AUTO
OS_AXISANGLE_DEGREES: 090

## 2023-08-03 ASSESSMENT — CONFRONTATIONAL VISUAL FIELD TEST (CVF)
OS_FINDINGS: FULL
OD_FINDINGS: FULL

## 2023-08-03 ASSESSMENT — SPHEQUIV_DERIVED
OS_SPHEQUIV: 0.375
OD_SPHEQUIV: 0.125

## 2023-08-03 ASSESSMENT — REFRACTION_AUTOREFRACTION
OS_AXIS: 065
OS_SPHERE: +0.50
OD_SPHERE: +0.50
OD_CYLINDER: -0.75
OD_AXIS: 105
OS_CYLINDER: -0.25

## 2023-08-03 ASSESSMENT — VISUAL ACUITY
OD_BCVA: 20/20-
OS_BCVA: 20/20-2

## 2023-08-03 ASSESSMENT — LID POSITION - DERMATOCHALASIS
OD_DERMATOCHALASIS: RUL
OS_DERMATOCHALASIS: LUL

## 2023-08-03 ASSESSMENT — LID EXAM ASSESSMENTS
OD_BLEPHARITIS: T
OS_BLEPHARITIS: T

## 2023-08-03 ASSESSMENT — AXIALLENGTH_DERIVED
OS_AL: 23.4014
OD_AL: 23.6351

## 2023-08-14 RX ORDER — PINDOLOL 5 MG/1
5 TABLET ORAL
Qty: 180 | Refills: 3 | Status: ACTIVE | COMMUNITY
Start: 2023-06-06 | End: 1900-01-01

## 2023-10-02 ENCOUNTER — APPOINTMENT (OUTPATIENT)
Dept: FAMILY MEDICINE | Facility: CLINIC | Age: 53
End: 2023-10-02
Payer: COMMERCIAL

## 2023-10-02 VITALS
OXYGEN SATURATION: 97 % | BODY MASS INDEX: 32.07 KG/M2 | SYSTOLIC BLOOD PRESSURE: 118 MMHG | DIASTOLIC BLOOD PRESSURE: 84 MMHG | HEART RATE: 71 BPM | WEIGHT: 224 LBS | TEMPERATURE: 97 F | HEIGHT: 70 IN

## 2023-10-02 DIAGNOSIS — N40.1 BENIGN PROSTATIC HYPERPLASIA WITH LOWER URINARY TRACT SYMPMS: ICD-10-CM

## 2023-10-02 PROCEDURE — 90686 IIV4 VACC NO PRSV 0.5 ML IM: CPT

## 2023-10-02 PROCEDURE — 36415 COLL VENOUS BLD VENIPUNCTURE: CPT

## 2023-10-02 PROCEDURE — G0008: CPT

## 2023-10-02 PROCEDURE — 99396 PREV VISIT EST AGE 40-64: CPT | Mod: 25

## 2023-10-03 LAB
ALBUMIN SERPL ELPH-MCNC: 5.1 G/DL
ALP BLD-CCNC: 32 U/L
ALT SERPL-CCNC: 25 U/L
ANION GAP SERPL CALC-SCNC: 12 MMOL/L
AST SERPL-CCNC: 22 U/L
BASOPHILS # BLD AUTO: 0.03 K/UL
BASOPHILS NFR BLD AUTO: 0.5 %
BILIRUB SERPL-MCNC: 0.7 MG/DL
BUN SERPL-MCNC: 15 MG/DL
CALCIUM SERPL-MCNC: 9.8 MG/DL
CHLORIDE SERPL-SCNC: 103 MMOL/L
CHOLEST SERPL-MCNC: 170 MG/DL
CO2 SERPL-SCNC: 25 MMOL/L
CREAT SERPL-MCNC: 0.93 MG/DL
EGFR: 98 ML/MIN/1.73M2
EOSINOPHIL # BLD AUTO: 0.08 K/UL
EOSINOPHIL NFR BLD AUTO: 1.4 %
GLUCOSE SERPL-MCNC: 96 MG/DL
HCT VFR BLD CALC: 49.9 %
HDLC SERPL-MCNC: 43 MG/DL
HGB BLD-MCNC: 16.7 G/DL
IMM GRANULOCYTES NFR BLD AUTO: 0.2 %
LDLC SERPL CALC-MCNC: 107 MG/DL
LYMPHOCYTES # BLD AUTO: 1.88 K/UL
LYMPHOCYTES NFR BLD AUTO: 33.8 %
MAN DIFF?: NORMAL
MCHC RBC-ENTMCNC: 31 PG
MCHC RBC-ENTMCNC: 33.5 GM/DL
MCV RBC AUTO: 92.6 FL
MONOCYTES # BLD AUTO: 0.49 K/UL
MONOCYTES NFR BLD AUTO: 8.8 %
NEUTROPHILS # BLD AUTO: 3.08 K/UL
NEUTROPHILS NFR BLD AUTO: 55.3 %
NONHDLC SERPL-MCNC: 127 MG/DL
PLATELET # BLD AUTO: 192 K/UL
POTASSIUM SERPL-SCNC: 4.3 MMOL/L
PROT SERPL-MCNC: 7.6 G/DL
PSA SERPL-MCNC: 7.47 NG/ML
RBC # BLD: 5.39 M/UL
RBC # FLD: 13.2 %
SODIUM SERPL-SCNC: 140 MMOL/L
TRIGL SERPL-MCNC: 109 MG/DL
TSH SERPL-ACNC: 1.11 UIU/ML
WBC # FLD AUTO: 5.57 K/UL

## 2023-11-16 ENCOUNTER — OFFICE (OUTPATIENT)
Dept: URBAN - METROPOLITAN AREA CLINIC 12 | Facility: CLINIC | Age: 53
Setting detail: OPHTHALMOLOGY
End: 2023-11-16
Payer: COMMERCIAL

## 2023-11-16 DIAGNOSIS — H01.001: ICD-10-CM

## 2023-11-16 DIAGNOSIS — H25.13: ICD-10-CM

## 2023-11-16 DIAGNOSIS — H90.3: ICD-10-CM

## 2023-11-16 DIAGNOSIS — H00.11: ICD-10-CM

## 2023-11-16 DIAGNOSIS — H01.004: ICD-10-CM

## 2023-11-16 PROCEDURE — 92557 COMPREHENSIVE HEARING TEST: CPT

## 2023-11-16 PROCEDURE — 92014 COMPRE OPH EXAM EST PT 1/>: CPT | Performed by: OPHTHALMOLOGY

## 2023-11-16 PROCEDURE — 92567 TYMPANOMETRY: CPT

## 2023-11-16 ASSESSMENT — REFRACTION_CURRENTRX
OD_VPRISM_DIRECTION: SV
OS_OVR_VA: 20/
OS_SPHERE: +1.50
OD_OVR_VA: 20/
OS_VPRISM_DIRECTION: SV
OD_SPHERE: +1.50

## 2023-11-16 ASSESSMENT — REFRACTION_MANIFEST
OD_AXIS: 125
OD_SPHERE: +1.00
OD_VA1: 20/20
OS_CYLINDER: -0.25
OS_SPHERE: +0.50
OS_AXIS: 075
OD_CYLINDER: -0.50
OS_VA1: 20/20

## 2023-11-16 ASSESSMENT — SPHEQUIV_DERIVED
OD_SPHEQUIV: 0.75
OS_SPHEQUIV: 0.375
OD_SPHEQUIV: 0.75
OS_SPHEQUIV: 0.375

## 2023-11-16 ASSESSMENT — REFRACTION_AUTOREFRACTION
OS_SPHERE: +0.50
OS_AXIS: 075
OS_CYLINDER: -0.25
OD_CYLINDER: -0.50
OD_SPHERE: +1.00
OD_AXIS: 125

## 2023-11-16 ASSESSMENT — LID POSITION - DERMATOCHALASIS
OD_DERMATOCHALASIS: RUL
OS_DERMATOCHALASIS: LUL

## 2023-11-16 ASSESSMENT — CONFRONTATIONAL VISUAL FIELD TEST (CVF)
OD_FINDINGS: FULL
OS_FINDINGS: FULL

## 2023-11-16 ASSESSMENT — LID EXAM ASSESSMENTS
OD_BLEPHARITIS: T
OS_BLEPHARITIS: T

## 2023-12-14 ENCOUNTER — NON-APPOINTMENT (OUTPATIENT)
Age: 53
End: 2023-12-14

## 2023-12-14 ENCOUNTER — APPOINTMENT (OUTPATIENT)
Dept: ELECTROPHYSIOLOGY | Facility: CLINIC | Age: 53
End: 2023-12-14
Payer: COMMERCIAL

## 2023-12-14 VITALS
BODY MASS INDEX: 32.07 KG/M2 | DIASTOLIC BLOOD PRESSURE: 82 MMHG | RESPIRATION RATE: 14 BRPM | HEART RATE: 62 BPM | SYSTOLIC BLOOD PRESSURE: 130 MMHG | OXYGEN SATURATION: 96 % | WEIGHT: 224 LBS | HEIGHT: 70 IN

## 2023-12-14 DIAGNOSIS — I49.3 VENTRICULAR PREMATURE DEPOLARIZATION: ICD-10-CM

## 2023-12-14 PROCEDURE — 93000 ELECTROCARDIOGRAM COMPLETE: CPT

## 2023-12-14 PROCEDURE — 99213 OFFICE O/P EST LOW 20 MIN: CPT | Mod: 25

## 2023-12-14 RX ORDER — VITAMIN E ACID SUCCINATE 268 MG
TABLET ORAL
Refills: 0 | Status: ACTIVE | COMMUNITY

## 2023-12-18 ENCOUNTER — OFFICE (OUTPATIENT)
Dept: URBAN - METROPOLITAN AREA CLINIC 100 | Facility: CLINIC | Age: 53
Setting detail: OPHTHALMOLOGY
End: 2023-12-18
Payer: COMMERCIAL

## 2023-12-18 DIAGNOSIS — H53.40: ICD-10-CM

## 2023-12-18 DIAGNOSIS — H02.831: ICD-10-CM

## 2023-12-18 DIAGNOSIS — H04.121: ICD-10-CM

## 2023-12-18 DIAGNOSIS — H02.834: ICD-10-CM

## 2023-12-18 DIAGNOSIS — H04.122: ICD-10-CM

## 2023-12-18 PROCEDURE — 83861 MICROFLUID ANALY TEARS: CPT | Mod: QW,RT | Performed by: OPHTHALMOLOGY

## 2023-12-18 PROCEDURE — 83861 MICROFLUID ANALY TEARS: CPT | Mod: QW,LT | Performed by: OPHTHALMOLOGY

## 2023-12-18 PROCEDURE — 92012 INTRM OPH EXAM EST PATIENT: CPT | Performed by: OPHTHALMOLOGY

## 2023-12-18 ASSESSMENT — LID EXAM ASSESSMENTS
OD_BLEPHARITIS: T
OS_BLEPHARITIS: T

## 2023-12-18 ASSESSMENT — REFRACTION_AUTOREFRACTION
OD_AXIS: 125
OS_SPHERE: +0.50
OD_SPHERE: +1.00
OS_CYLINDER: -0.25
OD_CYLINDER: -0.50
OS_AXIS: 075

## 2023-12-18 ASSESSMENT — TEAR BREAK UP TIME (TBUT)
OS_TBUT: T
OD_TBUT: T

## 2023-12-18 ASSESSMENT — LID POSITION - DERMATOCHALASIS
OS_DERMATOCHALASIS: LUL
OD_DERMATOCHALASIS: RUL

## 2023-12-18 ASSESSMENT — SPHEQUIV_DERIVED
OD_SPHEQUIV: 0.75
OS_SPHEQUIV: 0.375

## 2023-12-18 ASSESSMENT — CONFRONTATIONAL VISUAL FIELD TEST (CVF)
OS_FINDINGS: FULL
OD_FINDINGS: FULL

## 2023-12-26 PROBLEM — I49.3 ASYMPTOMATIC PVCS: Status: ACTIVE | Noted: 2020-01-07

## 2023-12-26 NOTE — HISTORY OF PRESENT ILLNESS
[FreeTextEntry1] : This is a 53-year-old gentleman with a history of PVCs and palpitations who presents for follow-up. He reports that his PVCs are well controlled with only rare episodes of palpitations. DINORA WESTON  denies chest pain, chest pressure, shortness of breath, lightheadedness, dizziness, palpitations, syncope, presyncope, orthopnea, PND, or edema.

## 2023-12-26 NOTE — CARDIOLOGY SUMMARY
[de-identified] : 6/7/22 : Sinus 2 PVCs.  [de-identified] : 9/11/2020, no Ischemia  [de-identified] : 3/27/18 LVEF 60-65%.

## 2023-12-26 NOTE — ASSESSMENT
[FreeTextEntry1] : This is a 53-year-old gentleman with RVOT morphology PVCs with stable symptoms.  PVCs: COntinue Pindalol 5 mg BID>

## 2024-01-04 ENCOUNTER — RX ONLY (RX ONLY)
Age: 54
End: 2024-01-04

## 2024-01-04 ENCOUNTER — OFFICE (OUTPATIENT)
Dept: URBAN - METROPOLITAN AREA CLINIC 100 | Facility: CLINIC | Age: 54
Setting detail: OPHTHALMOLOGY
End: 2024-01-04
Payer: COMMERCIAL

## 2024-01-04 DIAGNOSIS — H02.834: ICD-10-CM

## 2024-01-04 DIAGNOSIS — H02.831: ICD-10-CM

## 2024-01-04 PROCEDURE — 15823 BLEPHARP UPR EYELID XCSV SKN: CPT | Mod: E1,E3 | Performed by: OPHTHALMOLOGY

## 2024-01-04 ASSESSMENT — REFRACTION_AUTOREFRACTION
OS_AXIS: 075
OS_CYLINDER: -0.25
OD_SPHERE: +1.00
OD_CYLINDER: -0.50
OS_SPHERE: +0.50
OD_AXIS: 125

## 2024-01-04 ASSESSMENT — CONFRONTATIONAL VISUAL FIELD TEST (CVF)
OS_FINDINGS: FULL
OD_FINDINGS: FULL

## 2024-01-04 ASSESSMENT — LID POSITION - DERMATOCHALASIS
OD_DERMATOCHALASIS: RUL
OS_DERMATOCHALASIS: LUL

## 2024-01-04 ASSESSMENT — LID EXAM ASSESSMENTS
OD_BLEPHARITIS: T
OS_BLEPHARITIS: T

## 2024-01-04 ASSESSMENT — SPHEQUIV_DERIVED
OS_SPHEQUIV: 0.375
OD_SPHEQUIV: 0.75

## 2024-01-11 ENCOUNTER — OFFICE (OUTPATIENT)
Dept: URBAN - METROPOLITAN AREA CLINIC 100 | Facility: CLINIC | Age: 54
Setting detail: OPHTHALMOLOGY
End: 2024-01-11
Payer: COMMERCIAL

## 2024-01-11 ENCOUNTER — RX ONLY (RX ONLY)
Age: 54
End: 2024-01-11

## 2024-01-11 DIAGNOSIS — H02.834: ICD-10-CM

## 2024-01-11 DIAGNOSIS — H02.831: ICD-10-CM

## 2024-01-11 PROBLEM — H01.00 BLEPHARITIS; RIGHT UPPER LID, LEFT UPPER LID: Status: ACTIVE | Noted: 2024-01-04

## 2024-01-11 PROBLEM — C44.329 SQUAMOUS CELL CARCINOMA, OTHER PARTS OF FACE: Status: ACTIVE | Noted: 2024-01-04

## 2024-01-11 PROCEDURE — 99024 POSTOP FOLLOW-UP VISIT: CPT | Performed by: OPHTHALMOLOGY

## 2024-01-11 ASSESSMENT — TEAR BREAK UP TIME (TBUT)
OD_TBUT: T
OS_TBUT: T

## 2024-01-11 ASSESSMENT — LID POSITION - COMMENTS
OD_COMMENTS: INCISION INTACT
OS_COMMENTS: INCISION INTACT

## 2024-01-11 ASSESSMENT — SPHEQUIV_DERIVED
OS_SPHEQUIV: 0.375
OD_SPHEQUIV: 0.75

## 2024-01-11 ASSESSMENT — LID EXAM ASSESSMENTS
OS_BLEPHARITIS: T
OD_BLEPHARITIS: T

## 2024-01-11 ASSESSMENT — REFRACTION_AUTOREFRACTION
OS_AXIS: 075
OD_CYLINDER: -0.50
OS_CYLINDER: -0.25
OD_SPHERE: +1.00
OS_SPHERE: +0.50
OD_AXIS: 125

## 2024-01-11 ASSESSMENT — CONFRONTATIONAL VISUAL FIELD TEST (CVF)
OS_FINDINGS: FULL
OD_FINDINGS: FULL

## 2024-01-11 ASSESSMENT — LID POSITION - DERMATOCHALASIS
OD_DERMATOCHALASIS: ABSENT
OS_DERMATOCHALASIS: ABSENT

## 2024-02-01 ENCOUNTER — NON-APPOINTMENT (OUTPATIENT)
Age: 54
End: 2024-02-01

## 2024-02-12 ENCOUNTER — OFFICE (OUTPATIENT)
Dept: URBAN - METROPOLITAN AREA CLINIC 100 | Facility: CLINIC | Age: 54
Setting detail: OPHTHALMOLOGY
End: 2024-02-12
Payer: COMMERCIAL

## 2024-02-12 DIAGNOSIS — H02.834: ICD-10-CM

## 2024-02-12 DIAGNOSIS — H02.831: ICD-10-CM

## 2024-02-12 PROBLEM — H02.34 BROW PTOSIS; RIGHT UPPER LID, LEFT UPPER LID: Status: ACTIVE | Noted: 2024-02-12

## 2024-02-12 PROBLEM — H02.31 BROW PTOSIS; RIGHT UPPER LID, LEFT UPPER LID: Status: ACTIVE | Noted: 2024-02-12

## 2024-02-12 PROCEDURE — 99024 POSTOP FOLLOW-UP VISIT: CPT | Performed by: OPHTHALMOLOGY

## 2024-02-12 ASSESSMENT — REFRACTION_AUTOREFRACTION
OD_CYLINDER: -0.50
OD_AXIS: 125
OS_AXIS: 075
OS_SPHERE: +0.50
OS_CYLINDER: -0.25
OD_SPHERE: +1.00

## 2024-02-12 ASSESSMENT — SPHEQUIV_DERIVED
OS_SPHEQUIV: 0.375
OD_SPHEQUIV: 0.75

## 2024-02-12 ASSESSMENT — LID EXAM ASSESSMENTS
OD_BLEPHARITIS: T
OS_BLEPHARITIS: T
OS_BROW_PTOSIS: 2+
OD_BROW_PTOSIS: 2+

## 2024-02-12 ASSESSMENT — LID POSITION - DERMATOCHALASIS
OS_DERMATOCHALASIS: ABSENT
OD_DERMATOCHALASIS: ABSENT

## 2024-02-12 ASSESSMENT — CONFRONTATIONAL VISUAL FIELD TEST (CVF)
OS_FINDINGS: FULL
OD_FINDINGS: FULL

## 2024-02-12 ASSESSMENT — TEAR BREAK UP TIME (TBUT)
OS_TBUT: T
OD_TBUT: T

## 2024-02-12 ASSESSMENT — LID POSITION - COMMENTS
OD_COMMENTS: INCISION INTACT
OS_COMMENTS: INCISION INTACT

## 2024-02-16 ENCOUNTER — NON-APPOINTMENT (OUTPATIENT)
Age: 54
End: 2024-02-16

## 2024-02-16 ENCOUNTER — APPOINTMENT (OUTPATIENT)
Dept: CARDIOLOGY | Facility: CLINIC | Age: 54
End: 2024-02-16
Payer: COMMERCIAL

## 2024-02-16 VITALS
DIASTOLIC BLOOD PRESSURE: 84 MMHG | HEIGHT: 70 IN | BODY MASS INDEX: 32.93 KG/M2 | OXYGEN SATURATION: 96 % | HEART RATE: 73 BPM | WEIGHT: 230 LBS | SYSTOLIC BLOOD PRESSURE: 136 MMHG

## 2024-02-16 DIAGNOSIS — Z00.00 ENCOUNTER FOR GENERAL ADULT MEDICAL EXAMINATION W/OUT ABNORMAL FINDINGS: ICD-10-CM

## 2024-02-16 PROCEDURE — G2211 COMPLEX E/M VISIT ADD ON: CPT | Mod: NC,1L

## 2024-02-16 PROCEDURE — 93000 ELECTROCARDIOGRAM COMPLETE: CPT

## 2024-02-16 PROCEDURE — 99204 OFFICE O/P NEW MOD 45 MIN: CPT | Mod: 25

## 2024-02-16 NOTE — DISCUSSION/SUMMARY
[Compensated] : compensated [Left Heart Failure] : left heart failure [Patient] : the patient [FreeTextEntry1] : Pt will have a CT cor and Echo to evaluate LV function. Pt will follow up in 2 months.  [EKG obtained to assist in diagnosis and management of assessed problem(s)] : EKG obtained to assist in diagnosis and management of assessed problem(s)

## 2024-02-16 NOTE — HISTORY OF PRESENT ILLNESS
[FreeTextEntry1] : 55 yo male presents for evaluation of cardiac status. He reports that he has experienced new onset of exertional dyspnea when exercising and walking up stairs with laundry. He is a  and is concerned about his safety while working. No tobacco. ETOH 1-2/week.

## 2024-02-22 ENCOUNTER — APPOINTMENT (OUTPATIENT)
Dept: CARDIOLOGY | Facility: CLINIC | Age: 54
End: 2024-02-22
Payer: COMMERCIAL

## 2024-02-22 PROCEDURE — 93306 TTE W/DOPPLER COMPLETE: CPT

## 2024-03-01 NOTE — ASU PATIENT PROFILE, ADULT - PROVIDER NOTIFICATION
Name: CLARENCE SAMPSON    Question 1 General Status  Do you have any new or worsening symptoms since leaving the hospital? If yes please press 1, if no press 2, if you're not sure please press 3, or if you're feeling better press 4.   New Symptoms     Call Status: Attempt 1, Answered    Clinical Concerns - Issues List: Swelling/Edema   Comments: Patient states she had some swelling in her ankles. She called her clinic and spoke to Sonia and they sent in a water p ill for her. No additional questions or concerns at this time.     Declines

## 2024-04-10 ENCOUNTER — OUTPATIENT (OUTPATIENT)
Dept: OUTPATIENT SERVICES | Facility: HOSPITAL | Age: 54
LOS: 1 days | End: 2024-04-10
Payer: COMMERCIAL

## 2024-04-10 ENCOUNTER — APPOINTMENT (OUTPATIENT)
Dept: CT IMAGING | Facility: CLINIC | Age: 54
End: 2024-04-10
Payer: COMMERCIAL

## 2024-04-10 DIAGNOSIS — Z98.89 OTHER SPECIFIED POSTPROCEDURAL STATES: Chronic | ICD-10-CM

## 2024-04-10 DIAGNOSIS — Z96.659 PRESENCE OF UNSPECIFIED ARTIFICIAL KNEE JOINT: Chronic | ICD-10-CM

## 2024-04-10 DIAGNOSIS — Z96.652 PRESENCE OF LEFT ARTIFICIAL KNEE JOINT: Chronic | ICD-10-CM

## 2024-04-10 DIAGNOSIS — I34.0 NONRHEUMATIC MITRAL (VALVE) INSUFFICIENCY: ICD-10-CM

## 2024-04-10 DIAGNOSIS — Z00.8 ENCOUNTER FOR OTHER GENERAL EXAMINATION: ICD-10-CM

## 2024-04-10 DIAGNOSIS — R00.2 PALPITATIONS: ICD-10-CM

## 2024-04-10 PROCEDURE — 75574 CT ANGIO HRT W/3D IMAGE: CPT | Mod: 26

## 2024-04-10 PROCEDURE — 75574 CT ANGIO HRT W/3D IMAGE: CPT

## 2024-06-06 ENCOUNTER — APPOINTMENT (OUTPATIENT)
Dept: CARDIOLOGY | Facility: CLINIC | Age: 54
End: 2024-06-06
Payer: COMMERCIAL

## 2024-06-06 ENCOUNTER — NON-APPOINTMENT (OUTPATIENT)
Age: 54
End: 2024-06-06

## 2024-06-06 VITALS
WEIGHT: 226 LBS | HEART RATE: 68 BPM | OXYGEN SATURATION: 97 % | SYSTOLIC BLOOD PRESSURE: 100 MMHG | BODY MASS INDEX: 32.35 KG/M2 | DIASTOLIC BLOOD PRESSURE: 78 MMHG | HEIGHT: 70 IN

## 2024-06-06 DIAGNOSIS — R00.2 PALPITATIONS: ICD-10-CM

## 2024-06-06 DIAGNOSIS — I34.0 NONRHEUMATIC MITRAL (VALVE) INSUFFICIENCY: ICD-10-CM

## 2024-06-06 PROCEDURE — 99214 OFFICE O/P EST MOD 30 MIN: CPT | Mod: 25

## 2024-06-06 PROCEDURE — G2211 COMPLEX E/M VISIT ADD ON: CPT | Mod: NC

## 2024-06-06 PROCEDURE — 93000 ELECTROCARDIOGRAM COMPLETE: CPT

## 2024-06-06 NOTE — HISTORY OF PRESENT ILLNESS
[FreeTextEntry1] : 53 yo male presents for evaluation of cardiac status. He reports that he has experienced new onset of exertional dyspnea when exercising and walking up stairs with laundry. He is a  and is concerned about his safety while working. No tobacco. ETOH 1-2/week.  Pt was seen by EP and started on Pindolol. He experiences spontaneous onset of palpitations with flushing sensation with loss of concentration.

## 2024-06-06 NOTE — DISCUSSION/SUMMARY
[Compensated] : compensated [Left Heart Failure] : left heart failure [Patient] : the patient [FreeTextEntry1] : Pt will continue to exercise as tolerated. He will monitor his symptoms. He will eat a heart healthy diet. He will notify us if he  continues  to experience palpitations and near syncope.  [EKG obtained to assist in diagnosis and management of assessed problem(s)] : EKG obtained to assist in diagnosis and management of assessed problem(s)

## 2024-06-10 ENCOUNTER — APPOINTMENT (OUTPATIENT)
Dept: UROLOGY | Facility: CLINIC | Age: 54
End: 2024-06-10
Payer: COMMERCIAL

## 2024-06-10 VITALS
BODY MASS INDEX: 32.35 KG/M2 | SYSTOLIC BLOOD PRESSURE: 129 MMHG | HEIGHT: 70 IN | DIASTOLIC BLOOD PRESSURE: 84 MMHG | WEIGHT: 226 LBS | RESPIRATION RATE: 16 BRPM | HEART RATE: 70 BPM

## 2024-06-10 DIAGNOSIS — F17.290 NICOTINE DEPENDENCE, OTHER TOBACCO PRODUCT, UNCOMPLICATED: ICD-10-CM

## 2024-06-10 DIAGNOSIS — N40.1 BENIGN PROSTATIC HYPERPLASIA WITH LOWER URINARY TRACT SYMPMS: ICD-10-CM

## 2024-06-10 DIAGNOSIS — R97.20 ELEVATED PROSTATE, SPECIFIC ANTIGEN [PSA]: ICD-10-CM

## 2024-06-10 DIAGNOSIS — N13.8 BENIGN PROSTATIC HYPERPLASIA WITH LOWER URINARY TRACT SYMPMS: ICD-10-CM

## 2024-06-10 PROCEDURE — 99204 OFFICE O/P NEW MOD 45 MIN: CPT

## 2024-06-10 RX ORDER — SILODOSIN 8 MG/1
8 CAPSULE ORAL
Qty: 90 | Refills: 3 | Status: ACTIVE | COMMUNITY
Start: 1900-01-01 | End: 1900-01-01

## 2024-06-10 NOTE — ASSESSMENT
[FreeTextEntry1] : 54 year old Male with exam and history consistent with BPH with LUTS.  The anatomy of the lower genitourinary tract was explained to the patient, with the urine passing through the bladder neck and prostatic urethra as it exits the body. Prostatic enlargement can constrict the lumen and the bladder outlet, causing incomplete bladder emptying and irritative symptoms such and frequency and urgency. Alpha blockers can be used to relax the bladder neck and facilitate passage of urine from the bladder. 5-alpha reductase inhibitors can be employed to shrink the prostate and thereby allow for passage of urine more easily. Anticholinergics to decrease bladder irritative symptoms were also discussed, but it was stressed that they can increase post void residual and risk urinary retention. Tania continue silodosin 8 mg daily. He will think about TURP or urolift.   For peyronie disease, discussed treatment options are observation, collagenase injections, or surgical repair. He chooses observation for now.  For elevated PSA, will repeat PSA now.

## 2024-06-10 NOTE — HISTORY OF PRESENT ILLNESS
[FreeTextEntry1] : 54 year old man seen 06/10/2024 with complaint of frequency, urgency, hesitancy, weak stream, sensation of incomplete emptying and nocturia 2x/night. For BPH, he is on silodosin, which has improved his symptoms. He also reports peyronie disease. He started to have penile pain and curve to penis 2 years ago. Treated with vitamin E by Dr Vasquez. He reports occasional feeling of tightness and pulling with erection, but no pain. There is small lateral curve to the LEFT with erection, but not interfering with sexual activity. He has history of elevated PSA, see trend below. MRI 2022 PIRADS 2, 39 cc volume of prostate. IPSS 14

## 2024-06-11 LAB
APPEARANCE: CLEAR
BACTERIA: NEGATIVE /HPF
BILIRUBIN URINE: NEGATIVE
BLOOD URINE: NEGATIVE
CAST: 0 /LPF
COLOR: YELLOW
EPITHELIAL CELLS: 0 /HPF
GLUCOSE QUALITATIVE U: NEGATIVE MG/DL
KETONES URINE: NEGATIVE MG/DL
LEUKOCYTE ESTERASE URINE: NEGATIVE
MICROSCOPIC-UA: NORMAL
NITRITE URINE: NEGATIVE
PH URINE: 7
PROTEIN URINE: NEGATIVE MG/DL
RED BLOOD CELLS URINE: 0 /HPF
SPECIFIC GRAVITY URINE: 1.01
UROBILINOGEN URINE: 0.2 MG/DL
WHITE BLOOD CELLS URINE: 0 /HPF

## 2024-06-12 LAB — BACTERIA UR CULT: NORMAL

## 2024-06-14 ENCOUNTER — APPOINTMENT (OUTPATIENT)
Dept: ELECTROPHYSIOLOGY | Facility: CLINIC | Age: 54
End: 2024-06-14

## 2024-07-09 ENCOUNTER — RX RENEWAL (OUTPATIENT)
Age: 54
End: 2024-07-09

## 2024-07-16 ENCOUNTER — NON-APPOINTMENT (OUTPATIENT)
Age: 54
End: 2024-07-16

## 2024-08-16 ENCOUNTER — NON-APPOINTMENT (OUTPATIENT)
Age: 54
End: 2024-08-16

## 2024-08-21 NOTE — ASU DISCHARGE PLAN (ADULT/PEDIATRIC) - B. DRINK ALCOHOL, BEER, OR WINE
Recommend use of Trelegy DAILY. Once puff daily. Rinse/gargle/spit with water or mouth wash after each medication use to prevent oral thrush.      Order tubing for your CPAP. Once you start using it, please call he with issues.     ---  Thank you for allowing us to participate in your care today!     Please do not hesitate to contact our office with any follow-up questions or concerns. We can be contacted by phone or through your patient portal via the [employer] viky.    TIFFANI Orlando  Office hours: Monday - Friday 8 am to 5 pm    [facility] [address], [address]  [place] [address]  Phone: [phone]  Fax: [phone]    For scheduling:  Imaging (CT, x-ray): [phone], prompt 1-2-2  Pulmonary function tests: [phone]  Sleep studies: [phone]       Statement Selected

## 2024-10-07 ENCOUNTER — NON-APPOINTMENT (OUTPATIENT)
Age: 54
End: 2024-10-07

## 2024-10-08 ENCOUNTER — APPOINTMENT (OUTPATIENT)
Dept: FAMILY MEDICINE | Facility: CLINIC | Age: 54
End: 2024-10-08
Payer: COMMERCIAL

## 2024-10-08 ENCOUNTER — TRANSCRIPTION ENCOUNTER (OUTPATIENT)
Age: 54
End: 2024-10-08

## 2024-10-08 VITALS
DIASTOLIC BLOOD PRESSURE: 74 MMHG | WEIGHT: 227 LBS | TEMPERATURE: 97.8 F | BODY MASS INDEX: 32.5 KG/M2 | OXYGEN SATURATION: 96 % | HEART RATE: 62 BPM | SYSTOLIC BLOOD PRESSURE: 138 MMHG | HEIGHT: 70 IN

## 2024-10-08 DIAGNOSIS — R97.20 ELEVATED PROSTATE, SPECIFIC ANTIGEN [PSA]: ICD-10-CM

## 2024-10-08 DIAGNOSIS — R00.2 PALPITATIONS: ICD-10-CM

## 2024-10-08 DIAGNOSIS — Z00.00 ENCOUNTER FOR GENERAL ADULT MEDICAL EXAMINATION W/OUT ABNORMAL FINDINGS: ICD-10-CM

## 2024-10-08 DIAGNOSIS — N40.1 BENIGN PROSTATIC HYPERPLASIA WITH LOWER URINARY TRACT SYMPMS: ICD-10-CM

## 2024-10-08 DIAGNOSIS — N13.8 BENIGN PROSTATIC HYPERPLASIA WITH LOWER URINARY TRACT SYMPMS: ICD-10-CM

## 2024-10-08 PROCEDURE — 99396 PREV VISIT EST AGE 40-64: CPT | Mod: 25

## 2024-10-08 PROCEDURE — G0008: CPT

## 2024-10-08 PROCEDURE — 36415 COLL VENOUS BLD VENIPUNCTURE: CPT

## 2024-10-08 PROCEDURE — 90656 IIV3 VACC NO PRSV 0.5 ML IM: CPT

## 2024-10-09 ENCOUNTER — TRANSCRIPTION ENCOUNTER (OUTPATIENT)
Age: 54
End: 2024-10-09

## 2024-10-11 ENCOUNTER — TRANSCRIPTION ENCOUNTER (OUTPATIENT)
Age: 54
End: 2024-10-11

## 2024-10-22 ENCOUNTER — APPOINTMENT (OUTPATIENT)
Dept: ELECTROPHYSIOLOGY | Facility: CLINIC | Age: 54
End: 2024-10-22

## 2024-10-22 VITALS
BODY MASS INDEX: 32.5 KG/M2 | SYSTOLIC BLOOD PRESSURE: 134 MMHG | DIASTOLIC BLOOD PRESSURE: 84 MMHG | WEIGHT: 227 LBS | HEART RATE: 60 BPM | RESPIRATION RATE: 16 BRPM | HEIGHT: 70 IN | OXYGEN SATURATION: 96 %

## 2024-10-22 VITALS — SYSTOLIC BLOOD PRESSURE: 138 MMHG | DIASTOLIC BLOOD PRESSURE: 90 MMHG

## 2024-10-22 PROCEDURE — 99214 OFFICE O/P EST MOD 30 MIN: CPT | Mod: 25

## 2024-10-22 PROCEDURE — 93000 ELECTROCARDIOGRAM COMPLETE: CPT

## 2024-10-22 RX ORDER — TURMERIC/TURMERIC EXT/PEPR EXT 900-100 MG
CAPSULE ORAL
Refills: 0 | Status: ACTIVE | COMMUNITY

## 2024-11-05 ENCOUNTER — APPOINTMENT (OUTPATIENT)
Dept: COLORECTAL SURGERY | Facility: CLINIC | Age: 54
End: 2024-11-05
Payer: COMMERCIAL

## 2024-11-05 VITALS
DIASTOLIC BLOOD PRESSURE: 73 MMHG | SYSTOLIC BLOOD PRESSURE: 130 MMHG | HEIGHT: 70 IN | WEIGHT: 230 LBS | RESPIRATION RATE: 16 BRPM | BODY MASS INDEX: 32.93 KG/M2 | HEART RATE: 73 BPM

## 2024-11-05 DIAGNOSIS — K64.4 RESIDUAL HEMORRHOIDAL SKIN TAGS: ICD-10-CM

## 2024-11-05 DIAGNOSIS — K64.8 RESIDUAL HEMORRHOIDAL SKIN TAGS: ICD-10-CM

## 2024-11-05 DIAGNOSIS — K64.8 OTHER HEMORRHOIDS: ICD-10-CM

## 2024-11-05 PROCEDURE — 46221 LIGATION OF HEMORRHOID(S): CPT

## 2024-11-05 PROCEDURE — 99204 OFFICE O/P NEW MOD 45 MIN: CPT | Mod: 25

## 2024-11-11 ENCOUNTER — APPOINTMENT (OUTPATIENT)
Dept: SURGERY | Facility: CLINIC | Age: 54
End: 2024-11-11
Payer: COMMERCIAL

## 2024-11-11 VITALS
HEIGHT: 70 IN | DIASTOLIC BLOOD PRESSURE: 74 MMHG | HEART RATE: 68 BPM | WEIGHT: 227 LBS | TEMPERATURE: 98.4 F | BODY MASS INDEX: 32.5 KG/M2 | SYSTOLIC BLOOD PRESSURE: 125 MMHG | OXYGEN SATURATION: 96 % | RESPIRATION RATE: 16 BRPM

## 2024-11-11 DIAGNOSIS — D17.9 BENIGN LIPOMATOUS NEOPLASM, UNSPECIFIED: ICD-10-CM

## 2024-11-11 PROCEDURE — 99202 OFFICE O/P NEW SF 15 MIN: CPT

## 2024-11-12 PROBLEM — K64.8 HEMORRHOIDS, INTERNAL, WITH BLEEDING: Status: ACTIVE | Noted: 2024-11-12

## 2024-11-12 PROBLEM — K64.4 INTERNAL AND EXTERNAL BLEEDING HEMORRHOIDS: Status: ACTIVE | Noted: 2024-11-12

## 2024-11-21 ENCOUNTER — OFFICE (OUTPATIENT)
Dept: URBAN - METROPOLITAN AREA CLINIC 12 | Facility: CLINIC | Age: 54
Setting detail: OPHTHALMOLOGY
End: 2024-11-21
Payer: COMMERCIAL

## 2024-11-21 DIAGNOSIS — H02.831: ICD-10-CM

## 2024-11-21 DIAGNOSIS — H00.11: ICD-10-CM

## 2024-11-21 DIAGNOSIS — H25.13: ICD-10-CM

## 2024-11-21 DIAGNOSIS — H04.123: ICD-10-CM

## 2024-11-21 DIAGNOSIS — H02.34: ICD-10-CM

## 2024-11-21 DIAGNOSIS — C44.329: ICD-10-CM

## 2024-11-21 DIAGNOSIS — H02.834: ICD-10-CM

## 2024-11-21 DIAGNOSIS — H02.31: ICD-10-CM

## 2024-11-21 DIAGNOSIS — H52.13: ICD-10-CM

## 2024-11-21 DIAGNOSIS — H90.3: ICD-10-CM

## 2024-11-21 DIAGNOSIS — H01.004: ICD-10-CM

## 2024-11-21 DIAGNOSIS — H01.001: ICD-10-CM

## 2024-11-21 PROCEDURE — 92014 COMPRE OPH EXAM EST PT 1/>: CPT | Performed by: OPHTHALMOLOGY

## 2024-11-21 PROCEDURE — 92557 COMPREHENSIVE HEARING TEST: CPT

## 2024-11-21 ASSESSMENT — TEAR BREAK UP TIME (TBUT)
OS_TBUT: T
OD_TBUT: T

## 2024-11-21 ASSESSMENT — LID EXAM ASSESSMENTS
OD_BLEPHARITIS: T
OD_BROW_PTOSIS: 2+
OS_BROW_PTOSIS: 2+
OS_BLEPHARITIS: T

## 2024-11-21 ASSESSMENT — CONFRONTATIONAL VISUAL FIELD TEST (CVF)
OD_FINDINGS: FULL
OS_FINDINGS: FULL

## 2024-11-21 ASSESSMENT — LID POSITION - COMMENTS
OD_COMMENTS: INCISION INTACT
OS_COMMENTS: INCISION INTACT

## 2024-11-21 ASSESSMENT — TONOMETRY: OD_IOP_MMHG: 10

## 2024-11-21 ASSESSMENT — LID POSITION - DERMATOCHALASIS
OS_DERMATOCHALASIS: ABSENT
OD_DERMATOCHALASIS: ABSENT

## 2024-11-22 ASSESSMENT — REFRACTION_MANIFEST
OD_SPHERE: +1.00
OS_AXIS: 089
OS_SPHERE: +0.75
OD_AXIS: 103
OD_VA1: 20/20
OS_VA1: 20/20
OS_CYLINDER: -0.25
OD_CYLINDER: -0.50

## 2024-11-22 ASSESSMENT — KERATOMETRY
OD_K2POWER_DIOPTERS: 43.50
OS_K2POWER_DIOPTERS: 44.00
OD_K1POWER_DIOPTERS: 43.50
OS_K1POWER_DIOPTERS: 43.75
METHOD_AUTO_MANUAL: AUTO
OD_AXISANGLE_DEGREES: 090
OS_AXISANGLE_DEGREES: 083

## 2024-11-22 ASSESSMENT — REFRACTION_AUTOREFRACTION
OD_AXIS: 103
OS_AXIS: 089
OS_SPHERE: +0.75
OD_SPHERE: +1.00
OS_CYLINDER: -0.25
OD_CYLINDER: -0.50

## 2024-11-22 ASSESSMENT — VISUAL ACUITY
OD_BCVA: 20/20
OS_BCVA: 20/20

## 2024-11-26 ENCOUNTER — APPOINTMENT (OUTPATIENT)
Dept: COLORECTAL SURGERY | Facility: CLINIC | Age: 54
End: 2024-11-26
Payer: COMMERCIAL

## 2024-11-26 DIAGNOSIS — K64.4 RESIDUAL HEMORRHOIDAL SKIN TAGS: ICD-10-CM

## 2024-11-26 DIAGNOSIS — K64.8 RESIDUAL HEMORRHOIDAL SKIN TAGS: ICD-10-CM

## 2024-11-26 DIAGNOSIS — K64.8 OTHER HEMORRHOIDS: ICD-10-CM

## 2024-11-26 PROCEDURE — 46221 LIGATION OF HEMORRHOID(S): CPT

## 2024-11-26 PROCEDURE — 99214 OFFICE O/P EST MOD 30 MIN: CPT | Mod: 25

## 2024-12-06 ENCOUNTER — NON-APPOINTMENT (OUTPATIENT)
Age: 54
End: 2024-12-06

## 2024-12-06 ENCOUNTER — APPOINTMENT (OUTPATIENT)
Dept: CARDIOLOGY | Facility: CLINIC | Age: 54
End: 2024-12-06

## 2024-12-06 VITALS
WEIGHT: 234 LBS | BODY MASS INDEX: 33.5 KG/M2 | HEART RATE: 69 BPM | SYSTOLIC BLOOD PRESSURE: 124 MMHG | OXYGEN SATURATION: 96 % | HEIGHT: 70 IN | DIASTOLIC BLOOD PRESSURE: 84 MMHG

## 2024-12-06 PROBLEM — I77.810 DILATED AORTIC ROOT: Status: ACTIVE | Noted: 2024-12-06

## 2024-12-06 PROBLEM — R00.2 PALPITATION: Status: ACTIVE | Noted: 2024-12-06

## 2024-12-06 PROBLEM — I49.3 VENTRICULAR PREMATURE COMPLEXES: Status: ACTIVE | Noted: 2024-12-06

## 2024-12-06 PROCEDURE — 93000 ELECTROCARDIOGRAM COMPLETE: CPT

## 2024-12-06 PROCEDURE — 99214 OFFICE O/P EST MOD 30 MIN: CPT | Mod: 25

## 2024-12-06 RX ORDER — OMEPRAZOLE MAGNESIUM 40 MG/1
CAPSULE, DELAYED RELEASE ORAL DAILY
Refills: 0 | Status: ACTIVE | COMMUNITY

## 2024-12-09 ENCOUNTER — APPOINTMENT (OUTPATIENT)
Dept: UROLOGY | Facility: CLINIC | Age: 54
End: 2024-12-09
Payer: COMMERCIAL

## 2024-12-09 DIAGNOSIS — I49.3 VENTRICULAR PREMATURE DEPOLARIZATION: ICD-10-CM

## 2024-12-09 DIAGNOSIS — I77.810 THORACIC AORTIC ECTASIA: ICD-10-CM

## 2024-12-09 DIAGNOSIS — R00.2 PALPITATIONS: ICD-10-CM

## 2024-12-09 PROCEDURE — 99213 OFFICE O/P EST LOW 20 MIN: CPT

## 2024-12-09 PROCEDURE — 93000 ELECTROCARDIOGRAM COMPLETE: CPT

## 2024-12-09 PROCEDURE — G2211 COMPLEX E/M VISIT ADD ON: CPT | Mod: NC

## 2024-12-11 DIAGNOSIS — R97.20 ELEVATED PROSTATE, SPECIFIC ANTIGEN [PSA]: ICD-10-CM

## 2024-12-24 ENCOUNTER — APPOINTMENT (OUTPATIENT)
Dept: COLORECTAL SURGERY | Facility: CLINIC | Age: 54
End: 2024-12-24
Payer: COMMERCIAL

## 2024-12-24 VITALS
RESPIRATION RATE: 16 BRPM | HEIGHT: 70 IN | DIASTOLIC BLOOD PRESSURE: 92 MMHG | WEIGHT: 225 LBS | SYSTOLIC BLOOD PRESSURE: 141 MMHG | HEART RATE: 77 BPM | BODY MASS INDEX: 32.21 KG/M2

## 2024-12-24 DIAGNOSIS — K64.8 OTHER HEMORRHOIDS: ICD-10-CM

## 2024-12-24 PROCEDURE — 46221 LIGATION OF HEMORRHOID(S): CPT

## 2024-12-24 PROCEDURE — 99214 OFFICE O/P EST MOD 30 MIN: CPT | Mod: 25

## 2025-01-09 ENCOUNTER — RESULT REVIEW (OUTPATIENT)
Age: 55
End: 2025-01-09

## 2025-01-09 ENCOUNTER — OUTPATIENT (OUTPATIENT)
Dept: OUTPATIENT SERVICES | Facility: HOSPITAL | Age: 55
LOS: 1 days | End: 2025-01-09
Payer: COMMERCIAL

## 2025-01-09 ENCOUNTER — APPOINTMENT (OUTPATIENT)
Dept: MRI IMAGING | Facility: CLINIC | Age: 55
End: 2025-01-09
Payer: COMMERCIAL

## 2025-01-09 DIAGNOSIS — R97.20 ELEVATED PROSTATE SPECIFIC ANTIGEN [PSA]: ICD-10-CM

## 2025-01-09 DIAGNOSIS — Z00.8 ENCOUNTER FOR OTHER GENERAL EXAMINATION: ICD-10-CM

## 2025-01-09 DIAGNOSIS — Z98.89 OTHER SPECIFIED POSTPROCEDURAL STATES: Chronic | ICD-10-CM

## 2025-01-09 DIAGNOSIS — Z96.659 PRESENCE OF UNSPECIFIED ARTIFICIAL KNEE JOINT: Chronic | ICD-10-CM

## 2025-01-09 DIAGNOSIS — Z96.652 PRESENCE OF LEFT ARTIFICIAL KNEE JOINT: Chronic | ICD-10-CM

## 2025-01-09 PROCEDURE — 76498P: CUSTOM | Mod: 26

## 2025-01-09 PROCEDURE — 76498 UNLISTED MR PROCEDURE: CPT

## 2025-01-09 PROCEDURE — 72197 MRI PELVIS W/O & W/DYE: CPT | Mod: 26

## 2025-01-09 PROCEDURE — 72197 MRI PELVIS W/O & W/DYE: CPT

## 2025-01-09 PROCEDURE — A9585: CPT

## 2025-01-21 ENCOUNTER — APPOINTMENT (OUTPATIENT)
Dept: COLORECTAL SURGERY | Facility: CLINIC | Age: 55
End: 2025-01-21
Payer: COMMERCIAL

## 2025-01-21 DIAGNOSIS — K64.8 OTHER HEMORRHOIDS: ICD-10-CM

## 2025-01-21 DIAGNOSIS — K64.8 RESIDUAL HEMORRHOIDAL SKIN TAGS: ICD-10-CM

## 2025-01-21 DIAGNOSIS — K64.4 RESIDUAL HEMORRHOIDAL SKIN TAGS: ICD-10-CM

## 2025-01-21 PROCEDURE — 46221 LIGATION OF HEMORRHOID(S): CPT

## 2025-03-10 ENCOUNTER — APPOINTMENT (OUTPATIENT)
Dept: CARDIOLOGY | Facility: CLINIC | Age: 55
End: 2025-03-10
Payer: COMMERCIAL

## 2025-03-10 PROCEDURE — 93306 TTE W/DOPPLER COMPLETE: CPT

## 2025-04-24 ENCOUNTER — APPOINTMENT (OUTPATIENT)
Dept: ELECTROPHYSIOLOGY | Facility: CLINIC | Age: 55
End: 2025-04-24

## 2025-04-25 ENCOUNTER — NON-APPOINTMENT (OUTPATIENT)
Age: 55
End: 2025-04-25

## 2025-04-25 ENCOUNTER — APPOINTMENT (OUTPATIENT)
Dept: ELECTROPHYSIOLOGY | Facility: CLINIC | Age: 55
End: 2025-04-25
Payer: COMMERCIAL

## 2025-04-25 VITALS
HEIGHT: 70 IN | BODY MASS INDEX: 32.21 KG/M2 | RESPIRATION RATE: 16 BRPM | WEIGHT: 225 LBS | OXYGEN SATURATION: 97 % | HEART RATE: 69 BPM | SYSTOLIC BLOOD PRESSURE: 128 MMHG | DIASTOLIC BLOOD PRESSURE: 73 MMHG

## 2025-04-25 DIAGNOSIS — I49.3 VENTRICULAR PREMATURE DEPOLARIZATION: ICD-10-CM

## 2025-04-25 DIAGNOSIS — Z87.898 PERSONAL HISTORY OF OTHER SPECIFIED CONDITIONS: ICD-10-CM

## 2025-04-25 DIAGNOSIS — R00.2 PALPITATIONS: ICD-10-CM

## 2025-04-25 PROCEDURE — 93000 ELECTROCARDIOGRAM COMPLETE: CPT

## 2025-04-25 PROCEDURE — 99214 OFFICE O/P EST MOD 30 MIN: CPT | Mod: 25

## 2025-05-13 ENCOUNTER — RX RENEWAL (OUTPATIENT)
Age: 55
End: 2025-05-13

## 2025-06-09 ENCOUNTER — NON-APPOINTMENT (OUTPATIENT)
Age: 55
End: 2025-06-09

## 2025-06-11 ENCOUNTER — APPOINTMENT (OUTPATIENT)
Dept: CARDIOLOGY | Facility: CLINIC | Age: 55
End: 2025-06-11
Payer: COMMERCIAL

## 2025-06-11 VITALS
DIASTOLIC BLOOD PRESSURE: 74 MMHG | HEART RATE: 74 BPM | OXYGEN SATURATION: 96 % | HEIGHT: 70 IN | SYSTOLIC BLOOD PRESSURE: 122 MMHG | WEIGHT: 231 LBS | BODY MASS INDEX: 33.07 KG/M2

## 2025-06-11 PROCEDURE — G2211 COMPLEX E/M VISIT ADD ON: CPT | Mod: NC

## 2025-06-11 PROCEDURE — 93000 ELECTROCARDIOGRAM COMPLETE: CPT

## 2025-06-11 PROCEDURE — 99214 OFFICE O/P EST MOD 30 MIN: CPT

## 2025-06-11 RX ORDER — PANTOPRAZOLE 40 MG/1
40 TABLET, DELAYED RELEASE ORAL DAILY
Refills: 0 | Status: ACTIVE | COMMUNITY

## 2025-06-20 ENCOUNTER — APPOINTMENT (OUTPATIENT)
Dept: UROLOGY | Facility: CLINIC | Age: 55
End: 2025-06-20

## 2025-07-07 ENCOUNTER — APPOINTMENT (OUTPATIENT)
Dept: UROLOGY | Facility: CLINIC | Age: 55
End: 2025-07-07
Payer: COMMERCIAL

## 2025-07-07 VITALS
SYSTOLIC BLOOD PRESSURE: 133 MMHG | BODY MASS INDEX: 32.21 KG/M2 | HEIGHT: 70 IN | OXYGEN SATURATION: 98 % | DIASTOLIC BLOOD PRESSURE: 84 MMHG | WEIGHT: 225 LBS | RESPIRATION RATE: 16 BRPM | HEART RATE: 76 BPM

## 2025-07-07 PROCEDURE — 99214 OFFICE O/P EST MOD 30 MIN: CPT

## 2025-07-22 ENCOUNTER — APPOINTMENT (OUTPATIENT)
Dept: COLORECTAL SURGERY | Facility: CLINIC | Age: 55
End: 2025-07-22

## 2025-07-22 VITALS
DIASTOLIC BLOOD PRESSURE: 75 MMHG | BODY MASS INDEX: 32.93 KG/M2 | SYSTOLIC BLOOD PRESSURE: 109 MMHG | HEART RATE: 69 BPM | RESPIRATION RATE: 16 BRPM | WEIGHT: 230 LBS | HEIGHT: 70 IN

## 2025-07-22 DIAGNOSIS — K64.4 RESIDUAL HEMORRHOIDAL SKIN TAGS: ICD-10-CM

## 2025-07-22 DIAGNOSIS — K64.8 RESIDUAL HEMORRHOIDAL SKIN TAGS: ICD-10-CM

## 2025-07-22 DIAGNOSIS — K64.8 OTHER HEMORRHOIDS: ICD-10-CM

## 2025-07-22 PROCEDURE — 46221 LIGATION OF HEMORRHOID(S): CPT

## 2025-09-17 DIAGNOSIS — N40.1 BENIGN PROSTATIC HYPERPLASIA WITH LOWER URINARY TRACT SYMPMS: ICD-10-CM

## 2025-09-17 DIAGNOSIS — N13.8 BENIGN PROSTATIC HYPERPLASIA WITH LOWER URINARY TRACT SYMPMS: ICD-10-CM

## (undated) DEVICE — XI SEAL UNIV 5- 8 MM

## (undated) DEVICE — TROCAR SURGIQUEST AIRSEAL 5MM X 100MM

## (undated) DEVICE — DRAPE 3/4 SHEET 52X76"

## (undated) DEVICE — SUT VLOC 180 2-0 6" GS-22 GREEN

## (undated) DEVICE — TROCAR ETHICON ENDOPATH XCEL BLADELESS 12MM X 100MM STABILITY

## (undated) DEVICE — D HELP - CLEARVIEW CLEARIFY SYSTEM

## (undated) DEVICE — XI OBTURATOR OPTICAL BLADELESS 8MM

## (undated) DEVICE — Device

## (undated) DEVICE — XI DRAPE ARM

## (undated) DEVICE — PACK GENERAL LAPAROSCOPY

## (undated) DEVICE — XI DRAPE COLUMN

## (undated) DEVICE — TROCAR SURGIQUEST AIRSEAL 12MMX100MM

## (undated) DEVICE — NDL HYPO SAFE 22G X 1.5" (BLACK)